# Patient Record
Sex: MALE | Race: WHITE | NOT HISPANIC OR LATINO | Employment: OTHER
[De-identification: names, ages, dates, MRNs, and addresses within clinical notes are randomized per-mention and may not be internally consistent; named-entity substitution may affect disease eponyms.]

---

## 2022-11-25 ENCOUNTER — HOSPITAL ENCOUNTER (OUTPATIENT)
Dept: DATA CONVERSION | Age: 62
End: 2022-11-25
Attending: NURSE PRACTITIONER

## 2023-03-02 LAB
6-ACETYLMORPHINE: <25 NG/ML
7-AMINOCLONAZEPAM: <25 NG/ML
ALPHA-HYDROXYALPRAZOLAM: <25 NG/ML
ALPHA-HYDROXYMIDAZOLAM: <25 NG/ML
ALPRAZOLAM: <25 NG/ML
AMPHETAMINE (PRESENCE) IN URINE BY SCREEN METHOD: ABNORMAL
BARBITURATES PRESENCE IN URINE BY SCREEN METHOD: ABNORMAL
CANNABINOIDS IN URINE BY SCREEN METHOD: ABNORMAL
CHLORDIAZEPOXIDE: <25 NG/ML
CLONAZEPAM: <25 NG/ML
COCAINE (PRESENCE) IN URINE BY SCREEN METHOD: ABNORMAL
CODEINE: <50 NG/ML
CREATINE, URINE FOR DRUG: 207 MG/DL
DIAZEPAM: <25 NG/ML
DRUG SCREEN COMMENT URINE: ABNORMAL
EDDP: <25 NG/ML
FENTANYL CONFIRMATION, URINE: <2.5 NG/ML
HYDROCODONE: 763 NG/ML
HYDROMORPHONE: 294 NG/ML
LORAZEPAM: <25 NG/ML
METHADONE CONFIRMATION,URINE: <25 NG/ML
MIDAZOLAM: <25 NG/ML
MORPHINE URINE: <50 NG/ML
NORDIAZEPAM: <25 NG/ML
NORFENTANYL: <2.5 NG/ML
NORHYDROCODONE: >1000 NG/ML
NOROXYCODONE: <25 NG/ML
O-DESMETHYLTRAMADOL: <50 NG/ML
OXAZEPAM: <25 NG/ML
OXYCODONE: <25 NG/ML
OXYMORPHONE: <25 NG/ML
PHENCYCLIDINE (PRESENCE) IN URINE BY SCREEN METHOD: ABNORMAL
TEMAZEPAM: <25 NG/ML
TRAMADOL: <50 NG/ML
ZOLPIDEM METABOLITE (ZCA): <25 NG/ML
ZOLPIDEM: <25 NG/ML

## 2023-03-15 ENCOUNTER — TELEPHONE (OUTPATIENT)
Dept: PRIMARY CARE | Facility: CLINIC | Age: 63
End: 2023-03-15

## 2023-03-15 DIAGNOSIS — M47.12 CERVICAL ARTHRITIS WITH MYELOPATHY: ICD-10-CM

## 2023-03-15 PROBLEM — R27.0 ATAXIA: Status: ACTIVE | Noted: 2023-03-15

## 2023-03-15 PROBLEM — J30.9 ALLERGIC RHINITIS: Status: ACTIVE | Noted: 2023-03-15

## 2023-03-15 PROBLEM — M16.0 PRIMARY OSTEOARTHRITIS OF BOTH HIPS: Status: ACTIVE | Noted: 2023-03-15

## 2023-03-15 PROBLEM — M75.42 IMPINGEMENT SYNDROME OF LEFT SHOULDER: Status: ACTIVE | Noted: 2023-03-15

## 2023-03-15 PROBLEM — N40.1 BENIGN PROSTATIC HYPERPLASIA WITH LOWER URINARY TRACT SYMPTOMS: Status: ACTIVE | Noted: 2023-03-15

## 2023-03-15 PROBLEM — I10 HYPERTENSION: Status: ACTIVE | Noted: 2023-03-15

## 2023-03-15 PROBLEM — N20.1 RIGHT URETERAL STONE: Status: ACTIVE | Noted: 2023-03-15

## 2023-03-15 PROBLEM — R29.6 FALLS FREQUENTLY: Status: ACTIVE | Noted: 2023-03-15

## 2023-03-15 PROBLEM — F32.1 DEPRESSION, MAJOR, SINGLE EPISODE, MODERATE (MULTI): Status: ACTIVE | Noted: 2023-03-15

## 2023-03-15 PROBLEM — M75.41 IMPINGEMENT SYNDROME OF RIGHT SHOULDER: Status: ACTIVE | Noted: 2023-03-15

## 2023-03-15 PROBLEM — M19.012 PRIMARY OSTEOARTHRITIS OF LEFT SHOULDER: Status: ACTIVE | Noted: 2023-03-15

## 2023-03-15 PROBLEM — S06.9X5A: Status: ACTIVE | Noted: 2023-03-15

## 2023-03-15 PROBLEM — S46.211A STRAIN OF RIGHT BICEPS: Status: ACTIVE | Noted: 2023-03-15

## 2023-03-15 PROBLEM — N52.9 MALE ERECTILE DISORDER: Status: ACTIVE | Noted: 2023-03-15

## 2023-03-15 PROBLEM — F41.9 ANXIETY: Status: ACTIVE | Noted: 2023-03-15

## 2023-03-15 PROBLEM — E78.5 HYPERLIPIDEMIA: Status: ACTIVE | Noted: 2023-03-15

## 2023-03-15 PROBLEM — S06.5XAA SUBDURAL HEMATOMA (MULTI): Status: ACTIVE | Noted: 2023-03-15

## 2023-03-15 PROBLEM — M25.519 SHOULDER PAIN: Status: ACTIVE | Noted: 2023-03-15

## 2023-03-15 PROBLEM — E87.6 HYPOKALEMIA: Status: ACTIVE | Noted: 2023-03-15

## 2023-03-15 PROBLEM — G89.4 CHRONIC PAIN SYNDROME: Status: ACTIVE | Noted: 2023-03-15

## 2023-03-15 PROBLEM — E11.9 CONTROLLED TYPE 2 DIABETES MELLITUS WITHOUT COMPLICATION, WITHOUT LONG-TERM CURRENT USE OF INSULIN (MULTI): Status: ACTIVE | Noted: 2023-03-15

## 2023-03-15 PROBLEM — R56.9 SEIZURE (MULTI): Status: ACTIVE | Noted: 2023-03-15

## 2023-03-15 PROBLEM — M15.9 OSTEOARTHRITIS, GENERALIZED: Status: ACTIVE | Noted: 2023-03-15

## 2023-03-15 PROBLEM — M25.362 INSTABILITY OF LEFT KNEE JOINT: Status: ACTIVE | Noted: 2023-03-15

## 2023-03-15 PROBLEM — R51.9 HEADACHE: Status: ACTIVE | Noted: 2023-03-15

## 2023-03-15 PROBLEM — N20.0 BILATERAL RENAL STONES: Status: ACTIVE | Noted: 2023-03-15

## 2023-03-15 RX ORDER — HYDROCODONE BITARTRATE AND ACETAMINOPHEN 5; 325 MG/1; MG/1
1 TABLET ORAL 4 TIMES DAILY PRN
COMMUNITY
End: 2023-03-15 | Stop reason: SDUPTHER

## 2023-03-15 RX ORDER — HYDROCODONE BITARTRATE AND ACETAMINOPHEN 5; 325 MG/1; MG/1
1 TABLET ORAL 4 TIMES DAILY PRN
Qty: 120 TABLET | Refills: 0 | Status: SHIPPED | OUTPATIENT
Start: 2023-03-15 | End: 2023-04-15 | Stop reason: SDUPTHER

## 2023-04-15 DIAGNOSIS — M47.12 CERVICAL ARTHRITIS WITH MYELOPATHY: ICD-10-CM

## 2023-04-15 RX ORDER — HYDROCODONE BITARTRATE AND ACETAMINOPHEN 5; 325 MG/1; MG/1
1 TABLET ORAL 4 TIMES DAILY PRN
Qty: 120 TABLET | Refills: 0 | Status: SHIPPED | OUTPATIENT
Start: 2023-04-15 | End: 2023-05-18 | Stop reason: SDUPTHER

## 2023-04-18 RX ORDER — POTASSIUM CHLORIDE 750 MG/1
1 CAPSULE, EXTENDED RELEASE ORAL DAILY
COMMUNITY
Start: 2022-11-01 | End: 2023-10-13 | Stop reason: ALTCHOICE

## 2023-04-18 RX ORDER — MELOXICAM 15 MG/1
1 TABLET ORAL DAILY
COMMUNITY
End: 2024-02-06 | Stop reason: SDUPTHER

## 2023-04-18 RX ORDER — HYDROCODONE BITARTRATE AND ACETAMINOPHEN 5; 325 MG/1; MG/1
1 TABLET ORAL 4 TIMES DAILY
COMMUNITY
Start: 2019-10-11 | End: 2023-05-05 | Stop reason: SDUPTHER

## 2023-04-18 RX ORDER — PROPRANOLOL/HYDROCHLOROTHIAZID 40 MG-25MG
TABLET ORAL
COMMUNITY
End: 2023-10-17 | Stop reason: ALTCHOICE

## 2023-04-18 RX ORDER — DULOXETIN HYDROCHLORIDE 60 MG/1
1 CAPSULE, DELAYED RELEASE ORAL DAILY
COMMUNITY
End: 2024-02-06 | Stop reason: SDUPTHER

## 2023-04-18 RX ORDER — BACITRACIN 500 UNIT/G
1 OINTMENT (GRAM) TOPICAL DAILY
COMMUNITY
End: 2023-05-05 | Stop reason: ALTCHOICE

## 2023-04-18 RX ORDER — IBUPROFEN 100 MG/5ML
1000 SUSPENSION, ORAL (FINAL DOSE FORM) ORAL DAILY
COMMUNITY
Start: 2022-08-11

## 2023-04-18 RX ORDER — SILDENAFIL 100 MG/1
100 TABLET, FILM COATED ORAL AS NEEDED
COMMUNITY
End: 2023-05-05 | Stop reason: SDUPTHER

## 2023-04-18 RX ORDER — METOPROLOL SUCCINATE 25 MG/1
1 TABLET, EXTENDED RELEASE ORAL DAILY
COMMUNITY
Start: 2022-11-20 | End: 2023-06-05 | Stop reason: SDUPTHER

## 2023-04-18 RX ORDER — LECITHIN 1200 MG
1 CAPSULE ORAL DAILY
COMMUNITY

## 2023-04-18 RX ORDER — IRBESARTAN 75 MG/1
1 TABLET ORAL DAILY
COMMUNITY
End: 2023-09-01 | Stop reason: ALTCHOICE

## 2023-04-18 RX ORDER — LEVETIRACETAM 500 MG/1
500 TABLET ORAL 2 TIMES DAILY
COMMUNITY
Start: 2022-11-01 | End: 2024-02-06 | Stop reason: SDUPTHER

## 2023-04-18 RX ORDER — HYDROCHLOROTHIAZIDE 12.5 MG/1
1 CAPSULE ORAL DAILY
COMMUNITY
Start: 2022-11-01 | End: 2023-09-01 | Stop reason: ALTCHOICE

## 2023-04-18 RX ORDER — NALOXONE HYDROCHLORIDE 4 MG/.1ML
4 SPRAY NASAL AS NEEDED
COMMUNITY
Start: 2020-05-27

## 2023-04-18 RX ORDER — MUPIROCIN 20 MG/G
OINTMENT TOPICAL 2 TIMES DAILY
COMMUNITY
Start: 2022-11-25 | End: 2023-09-01 | Stop reason: ALTCHOICE

## 2023-04-18 RX ORDER — NEOMYCIN SULFATE, POLYMYXIN B SULFATE, HYDROCORTISONE 3.5; 10000; 1 MG/ML; [USP'U]/ML; MG/ML
SOLUTION/ DROPS AURICULAR (OTIC) 4 TIMES DAILY
COMMUNITY
Start: 2022-03-29 | End: 2023-09-01 | Stop reason: ALTCHOICE

## 2023-04-18 RX ORDER — QUETIAPINE FUMARATE 25 MG/1
25 TABLET, FILM COATED ORAL NIGHTLY
COMMUNITY
Start: 2022-08-05 | End: 2023-05-05 | Stop reason: SDUPTHER

## 2023-04-18 RX ORDER — DULOXETIN HYDROCHLORIDE 30 MG/1
30 CAPSULE, DELAYED RELEASE ORAL
COMMUNITY
End: 2024-02-06 | Stop reason: SDUPTHER

## 2023-04-18 RX ORDER — FENOFIBRIC ACID 135 MG/1
1 CAPSULE, DELAYED RELEASE ORAL DAILY
COMMUNITY
Start: 2020-01-03 | End: 2024-02-06 | Stop reason: SDUPTHER

## 2023-04-18 RX ORDER — ACETAMINOPHEN 500 MG
50 TABLET ORAL DAILY
COMMUNITY
Start: 2022-08-11

## 2023-04-25 ENCOUNTER — APPOINTMENT (OUTPATIENT)
Dept: PRIMARY CARE | Facility: CLINIC | Age: 63
End: 2023-04-25

## 2023-04-26 ENCOUNTER — TELEPHONE (OUTPATIENT)
Dept: PRIMARY CARE | Facility: CLINIC | Age: 63
End: 2023-04-26

## 2023-04-26 NOTE — TELEPHONE ENCOUNTER
Has a rash on right arm. Asking for a cream for it. Cannot come to the office. Went to the ER they gave him hydrocortisone cream. The rash burns, has welts and is in blotches from his armpit to elbow.

## 2023-05-04 ENCOUNTER — TELEPHONE (OUTPATIENT)
Dept: PRIMARY CARE | Facility: CLINIC | Age: 63
End: 2023-05-04

## 2023-05-04 DIAGNOSIS — M75.42 IMPINGEMENT SYNDROME OF LEFT SHOULDER: ICD-10-CM

## 2023-05-04 DIAGNOSIS — M75.41 IMPINGEMENT SYNDROME OF RIGHT SHOULDER: ICD-10-CM

## 2023-05-04 RX ORDER — DICLOFENAC SODIUM 10 MG/G
2 GEL TOPICAL 3 TIMES DAILY PRN
COMMUNITY
Start: 2023-04-20 | End: 2023-05-04 | Stop reason: SDUPTHER

## 2023-05-04 RX ORDER — DICLOFENAC SODIUM 10 MG/G
2 GEL TOPICAL 3 TIMES DAILY PRN
Qty: 100 G | Refills: 3 | Status: SHIPPED | OUTPATIENT
Start: 2023-05-04 | End: 2023-09-01 | Stop reason: ALTCHOICE

## 2023-05-04 NOTE — TELEPHONE ENCOUNTER
5/4  Kenneth called in and said that he could not get his injection at Orthopedics and that he needs more cream for his shoulder.  Voltarin or if there is something stronger.

## 2023-05-05 ENCOUNTER — OFFICE VISIT (OUTPATIENT)
Dept: PRIMARY CARE | Facility: CLINIC | Age: 63
End: 2023-05-05
Payer: COMMERCIAL

## 2023-05-05 VITALS
BODY MASS INDEX: 30.34 KG/M2 | DIASTOLIC BLOOD PRESSURE: 84 MMHG | OXYGEN SATURATION: 96 % | WEIGHT: 188 LBS | SYSTOLIC BLOOD PRESSURE: 126 MMHG | HEART RATE: 68 BPM

## 2023-05-05 DIAGNOSIS — F32.1 DEPRESSION, MAJOR, SINGLE EPISODE, MODERATE (MULTI): ICD-10-CM

## 2023-05-05 DIAGNOSIS — F41.9 ANXIETY: Primary | ICD-10-CM

## 2023-05-05 DIAGNOSIS — I73.9 PVD (PERIPHERAL VASCULAR DISEASE) (CMS-HCC): ICD-10-CM

## 2023-05-05 DIAGNOSIS — R27.0 ATAXIA: ICD-10-CM

## 2023-05-05 DIAGNOSIS — L98.491: ICD-10-CM

## 2023-05-05 DIAGNOSIS — M75.41 IMPINGEMENT SYNDROME OF RIGHT SHOULDER: ICD-10-CM

## 2023-05-05 PROCEDURE — 1036F TOBACCO NON-USER: CPT | Performed by: FAMILY MEDICINE

## 2023-05-05 PROCEDURE — 3074F SYST BP LT 130 MM HG: CPT | Performed by: FAMILY MEDICINE

## 2023-05-05 PROCEDURE — 99214 OFFICE O/P EST MOD 30 MIN: CPT | Performed by: FAMILY MEDICINE

## 2023-05-05 PROCEDURE — 3079F DIAST BP 80-89 MM HG: CPT | Performed by: FAMILY MEDICINE

## 2023-05-05 PROCEDURE — 4010F ACE/ARB THERAPY RXD/TAKEN: CPT | Performed by: FAMILY MEDICINE

## 2023-05-05 PROCEDURE — 20610 DRAIN/INJ JOINT/BURSA W/O US: CPT | Performed by: FAMILY MEDICINE

## 2023-05-05 RX ORDER — ASCORBIC ACID 1000 MG
175 TABLET ORAL DAILY
COMMUNITY

## 2023-05-05 RX ORDER — NALOXONE HYDROCHLORIDE 4 MG/.1ML
1 SPRAY NASAL AS NEEDED
COMMUNITY
End: 2023-05-05 | Stop reason: SDUPTHER

## 2023-05-05 RX ORDER — LIDOCAINE HYDROCHLORIDE 10 MG/ML
2 INJECTION INFILTRATION; PERINEURAL
Status: COMPLETED | OUTPATIENT
Start: 2023-05-05 | End: 2023-05-05

## 2023-05-05 RX ORDER — CHOLECALCIFEROL (VITAMIN D3) 25 MCG
1000 TABLET ORAL DAILY
COMMUNITY
End: 2023-05-05 | Stop reason: SDUPTHER

## 2023-05-05 RX ORDER — DONEPEZIL HYDROCHLORIDE 10 MG/1
10 TABLET, FILM COATED ORAL NIGHTLY
COMMUNITY
Start: 2018-08-03 | End: 2023-10-17 | Stop reason: ALTCHOICE

## 2023-05-05 RX ORDER — QUETIAPINE FUMARATE 25 MG/1
25 TABLET, FILM COATED ORAL NIGHTLY
COMMUNITY
Start: 2022-09-27 | End: 2023-05-05 | Stop reason: SDUPTHER

## 2023-05-05 RX ORDER — QUETIAPINE FUMARATE 25 MG/1
50 TABLET, FILM COATED ORAL NIGHTLY
Qty: 60 TABLET | Refills: 11 | Status: SHIPPED | OUTPATIENT
Start: 2023-05-05 | End: 2024-02-06 | Stop reason: SDUPTHER

## 2023-05-05 RX ORDER — MODAFINIL 200 MG/1
200 TABLET ORAL DAILY
COMMUNITY
Start: 2018-09-07

## 2023-05-05 RX ORDER — IBUPROFEN 100 MG/5ML
1 SUSPENSION, ORAL (FINAL DOSE FORM) ORAL DAILY
COMMUNITY
Start: 2022-08-11 | End: 2023-05-05 | Stop reason: SDUPTHER

## 2023-05-05 RX ORDER — FLUTICASONE PROPIONATE 50 MCG
1 SPRAY, SUSPENSION (ML) NASAL DAILY
COMMUNITY
End: 2024-02-06 | Stop reason: SDUPTHER

## 2023-05-05 RX ORDER — SILDENAFIL 100 MG/1
100 TABLET, FILM COATED ORAL AS NEEDED
COMMUNITY
Start: 2018-06-30

## 2023-05-05 RX ORDER — TRIAMCINOLONE ACETONIDE 40 MG/ML
40 INJECTION, SUSPENSION INTRA-ARTICULAR; INTRAMUSCULAR
Status: COMPLETED | OUTPATIENT
Start: 2023-05-05 | End: 2023-05-05

## 2023-05-05 RX ORDER — OXCARBAZEPINE 300 MG/1
300 TABLET, FILM COATED ORAL 2 TIMES DAILY
COMMUNITY
Start: 2018-10-19 | End: 2023-09-01 | Stop reason: ALTCHOICE

## 2023-05-05 RX ORDER — IRBESARTAN AND HYDROCHLOROTHIAZIDE 300; 12.5 MG/1; MG/1
1 TABLET, FILM COATED ORAL DAILY
COMMUNITY
End: 2023-05-05 | Stop reason: ALTCHOICE

## 2023-05-05 RX ADMIN — LIDOCAINE HYDROCHLORIDE 2 ML: 10 INJECTION INFILTRATION; PERINEURAL at 12:32

## 2023-05-05 RX ADMIN — TRIAMCINOLONE ACETONIDE 40 MG: 40 INJECTION, SUSPENSION INTRA-ARTICULAR; INTRAMUSCULAR at 12:32

## 2023-05-05 ASSESSMENT — PATIENT HEALTH QUESTIONNAIRE - PHQ9
7. TROUBLE CONCENTRATING ON THINGS, SUCH AS READING THE NEWSPAPER OR WATCHING TELEVISION: NOT AT ALL
5. POOR APPETITE OR OVEREATING: NOT AT ALL
3. TROUBLE FALLING OR STAYING ASLEEP: NOT AT ALL
9. THOUGHTS THAT YOU WOULD BE BETTER OFF DEAD, OR OF HURTING YOURSELF: NOT AT ALL
6. FEELING BAD ABOUT YOURSELF - OR THAT YOU ARE A FAILURE OR HAVE LET YOURSELF OR YOUR FAMILY DOWN: MORE THAN HALF THE DAYS
1. LITTLE INTEREST OR PLEASURE IN DOING THINGS: MORE THAN HALF THE DAYS
SUM OF ALL RESPONSES TO PHQ9 QUESTIONS 1 & 2: 4
4. FEELING TIRED OR HAVING LITTLE ENERGY: SEVERAL DAYS
10. IF YOU CHECKED OFF ANY PROBLEMS, HOW DIFFICULT HAVE THESE PROBLEMS MADE IT FOR YOU TO DO YOUR WORK, TAKE CARE OF THINGS AT HOME, OR GET ALONG WITH OTHER PEOPLE: VERY DIFFICULT
8. MOVING OR SPEAKING SO SLOWLY THAT OTHER PEOPLE COULD HAVE NOTICED. OR THE OPPOSITE, BEING SO FIGETY OR RESTLESS THAT YOU HAVE BEEN MOVING AROUND A LOT MORE THAN USUAL: MORE THAN HALF THE DAYS
SUM OF ALL RESPONSES TO PHQ QUESTIONS 1-9: 9
2. FEELING DOWN, DEPRESSED OR HOPELESS: MORE THAN HALF THE DAYS

## 2023-05-05 NOTE — PROGRESS NOTES
Subjective   Patient ID: Kenneth Valenzuela is a 63 y.o. male who presents for evaluate (Behavior issues and joint pain, check toe on right foot).    HPI   Right shoulder pain.  Had a fall a month ago. Aches a lot. Pain with abduction and using walker     Right toe has been purple and painful and sore. Has been fitted for a brace per [facility].  That is for right ankle  to help with pressure against toe. Pain in toe at IP.  Very tender.  Looks discolored.  He feels there is something in there that needs to come out.  The toe is the most bothersome thing today.  He does have DM but last A1C 6.6%      Behavioral issues aggravated again. Has depression.   Does not like to take the medication.   Has trouble remembering them.. He is only on Seroquel 25 mg daily at hs. Will up to 50 and see how he does.     Review of Systems    Objective   /84 (BP Location: Left arm, Patient Position: Sitting)   Pulse 68   Wt 85.3 kg (188 lb)   SpO2 96%   BMI 30.34 kg/m²     Physical Exam  Alert and oriented x3.  No acute distress.  He is unstable as usually is with his ataxia.  He has multiple abrasions at various stages of healing.  Needs to use a walker to stand.    Right shoulder with good range of motion but positive Nickerson and Neer's.  Tender subacromial.  No redness, swelling, warmth.    Right foot with 2+ PT and DP pulses.  However his capillary refill is about 3 seconds distal great and second toe.  There is a small ulcer with callus around it on the plantar side of the IP joint medially.  I shaved off some of the callus.  And did have some bleeding with that.  Easily controlled with a Band-Aid.  Walking out he said it felt a lot better.    Good mood and affect, judgment and insight.  Pleasant and cooperative.  He described the pills he takes as different colors and shapes rather than the names.  So I did write out the name of the Seroquel that I am going to have him increase.Patient ID: Kenneth Valenzuela is a 63  y.o. male.    Joint Injection Large/Arthrocentesis: R subacromial bursa on 5/5/2023 12:32 PM  Indications: pain  Details: 25 G needle, lateral approach  Medications: 40 mg triamcinolone acetonide 40 mg/mL; 2 mL lidocaine 10 mg/mL (1 %)  Outcome: tolerated well, no immediate complications  Procedure, treatment alternatives, risks and benefits explained, specific risks discussed. Patient was prepped and draped in the usual sterile fashion (Prep with Chlorhexidine.  ).             Assessment/Plan   Problem List Items Addressed This Visit       Anxiety - Primary    Depression, major, single episode, moderate (CMS/HCC)    Impingement syndrome of right shoulder     Other Visit Diagnoses       PVD (peripheral vascular disease) (CMS/HCC)        Ischemic ulcer, limited to breakdown of skin (CMS/HCC)

## 2023-05-18 ENCOUNTER — TELEPHONE (OUTPATIENT)
Dept: PRIMARY CARE | Facility: CLINIC | Age: 63
End: 2023-05-18

## 2023-05-18 DIAGNOSIS — M47.12 CERVICAL ARTHRITIS WITH MYELOPATHY: ICD-10-CM

## 2023-05-18 RX ORDER — HYDROCODONE BITARTRATE AND ACETAMINOPHEN 5; 325 MG/1; MG/1
1 TABLET ORAL 4 TIMES DAILY PRN
Qty: 120 TABLET | Refills: 0 | Status: SHIPPED | OUTPATIENT
Start: 2023-05-18 | End: 2023-06-14 | Stop reason: SDUPTHER

## 2023-05-23 ENCOUNTER — TELEPHONE (OUTPATIENT)
Dept: PRIMARY CARE | Facility: CLINIC | Age: 63
End: 2023-05-23

## 2023-05-23 NOTE — TELEPHONE ENCOUNTER
----- Message from Nils Burleson MD sent at 5/23/2023 12:55 PM EDT -----  Let him know the circulation to the legs is normal

## 2023-05-27 PROBLEM — R45.4 BEHAVIOR-IRRITABILITY: Status: ACTIVE | Noted: 2019-04-05

## 2023-05-27 PROBLEM — R46.89: Status: ACTIVE | Noted: 2019-04-05

## 2023-05-27 PROBLEM — S06.9XAS: Status: ACTIVE | Noted: 2019-04-05

## 2023-05-27 PROBLEM — R41.89 COGNITIVE DEFICIT AS LATE EFFECT OF TRAUMATIC BRAIN INJURY: Status: ACTIVE | Noted: 2019-04-05

## 2023-05-27 PROBLEM — F06.8 COGNITIVE DEFICIT AS LATE EFFECT OF TRAUMATIC BRAIN INJURY (CMS-HCC): Status: ACTIVE | Noted: 2019-04-05

## 2023-05-27 PROBLEM — S06.9X0S: Status: ACTIVE | Noted: 2019-04-05

## 2023-05-27 PROBLEM — R53.1 WEAKNESS: Status: ACTIVE | Noted: 2022-10-22

## 2023-05-27 PROBLEM — E66.9 OBESITY (BMI 30.0-34.9): Status: ACTIVE | Noted: 2017-11-13

## 2023-05-27 PROBLEM — S09.90XA HEAD INJURY: Status: ACTIVE | Noted: 2023-05-27

## 2023-05-27 PROBLEM — R00.0 TACHYCARDIA: Status: ACTIVE | Noted: 2023-05-27

## 2023-05-27 PROBLEM — E66.811 OBESITY (BMI 30.0-34.9): Status: ACTIVE | Noted: 2017-11-13

## 2023-05-27 PROBLEM — M54.6 THORACIC BACK PAIN: Status: ACTIVE | Noted: 2022-10-22

## 2023-05-27 PROBLEM — G47.33 OSA (OBSTRUCTIVE SLEEP APNEA): Status: ACTIVE | Noted: 2019-04-05

## 2023-05-27 PROBLEM — S06.9X0S COGNITIVE DEFICIT AS LATE EFFECT OF TRAUMATIC BRAIN INJURY (CMS-HCC): Status: ACTIVE | Noted: 2019-04-05

## 2023-05-27 PROBLEM — G43.019 INTRACTABLE MIGRAINE WITHOUT AURA AND WITHOUT STATUS MIGRAINOSUS: Status: ACTIVE | Noted: 2019-04-05

## 2023-05-27 PROBLEM — R26.89 BALANCE DISTURBANCE DUE TO OLD HEAD INJURY: Status: ACTIVE | Noted: 2019-04-05

## 2023-05-27 PROBLEM — S06.9XAS COGNITIVE DEFICIT AS LATE EFFECT OF TRAUMATIC BRAIN INJURY: Status: ACTIVE | Noted: 2019-04-05

## 2023-05-27 PROBLEM — M20.21 ACQUIRED HALLUX RIGIDUS OF RIGHT FOOT: Status: ACTIVE | Noted: 2023-05-27

## 2023-05-27 PROBLEM — I62.9 INTRACRANIAL BLEED (MULTI): Status: ACTIVE | Noted: 2022-10-01

## 2023-05-27 PROBLEM — E83.52 HYPERCALCEMIA: Status: ACTIVE | Noted: 2023-05-27

## 2023-05-27 PROBLEM — M71.162: Status: ACTIVE | Noted: 2019-02-20

## 2023-05-27 PROBLEM — M25.552 HIP PAIN, LEFT: Status: ACTIVE | Noted: 2023-05-27

## 2023-05-27 PROBLEM — S09.90XS BALANCE DISTURBANCE DUE TO OLD HEAD INJURY: Status: ACTIVE | Noted: 2019-04-05

## 2023-05-27 PROBLEM — G56.00 MEDIAN NERVE ENTRAPMENT: Status: ACTIVE | Noted: 2023-05-27

## 2023-05-27 PROBLEM — E11.42 DIABETIC POLYNEUROPATHY ASSOCIATED WITH TYPE 2 DIABETES MELLITUS (MULTI): Status: ACTIVE | Noted: 2023-05-27

## 2023-06-01 LAB
ALANINE AMINOTRANSFERASE (SGPT) (U/L) IN SER/PLAS: 28 U/L (ref 10–52)
ALBUMIN (G/DL) IN SER/PLAS: 4.5 G/DL (ref 3.4–5)
ALKALINE PHOSPHATASE (U/L) IN SER/PLAS: 50 U/L (ref 33–136)
ANION GAP IN SER/PLAS: 12 MMOL/L (ref 10–20)
ASPARTATE AMINOTRANSFERASE (SGOT) (U/L) IN SER/PLAS: 26 U/L (ref 9–39)
BILIRUBIN TOTAL (MG/DL) IN SER/PLAS: 0.8 MG/DL (ref 0–1.2)
CALCIUM (MG/DL) IN SER/PLAS: 9.8 MG/DL (ref 8.6–10.3)
CARBON DIOXIDE, TOTAL (MMOL/L) IN SER/PLAS: 28 MMOL/L (ref 21–32)
CHLORIDE (MMOL/L) IN SER/PLAS: 103 MMOL/L (ref 98–107)
CREATININE (MG/DL) IN SER/PLAS: 0.8 MG/DL (ref 0.5–1.3)
ESTIMATED AVERAGE GLUCOSE FOR HBA1C: 171 MG/DL
GFR MALE: >90 ML/MIN/1.73M2
GLUCOSE (MG/DL) IN SER/PLAS: 139 MG/DL (ref 74–99)
HEMOGLOBIN A1C/HEMOGLOBIN TOTAL IN BLOOD: 7.6 %
POTASSIUM (MMOL/L) IN SER/PLAS: 4.1 MMOL/L (ref 3.5–5.3)
PROTEIN TOTAL: 6.8 G/DL (ref 6.4–8.2)
SODIUM (MMOL/L) IN SER/PLAS: 139 MMOL/L (ref 136–145)
UREA NITROGEN (MG/DL) IN SER/PLAS: 14 MG/DL (ref 6–23)

## 2023-06-05 ENCOUNTER — OFFICE VISIT (OUTPATIENT)
Dept: PRIMARY CARE | Facility: CLINIC | Age: 63
End: 2023-06-05
Payer: COMMERCIAL

## 2023-06-05 VITALS
BODY MASS INDEX: 30.99 KG/M2 | SYSTOLIC BLOOD PRESSURE: 118 MMHG | OXYGEN SATURATION: 93 % | WEIGHT: 192 LBS | HEART RATE: 85 BPM | DIASTOLIC BLOOD PRESSURE: 72 MMHG

## 2023-06-05 DIAGNOSIS — R27.0 ATAXIA: ICD-10-CM

## 2023-06-05 DIAGNOSIS — E11.42 DIABETIC POLYNEUROPATHY ASSOCIATED WITH TYPE 2 DIABETES MELLITUS (MULTI): ICD-10-CM

## 2023-06-05 DIAGNOSIS — R35.0 BENIGN PROSTATIC HYPERPLASIA WITH URINARY FREQUENCY: ICD-10-CM

## 2023-06-05 DIAGNOSIS — E66.09 CLASS 1 OBESITY DUE TO EXCESS CALORIES WITH SERIOUS COMORBIDITY AND BODY MASS INDEX (BMI) OF 31.0 TO 31.9 IN ADULT: ICD-10-CM

## 2023-06-05 DIAGNOSIS — S06.9X0S COGNITIVE DEFICIT AS LATE EFFECT OF TRAUMATIC BRAIN INJURY (CMS-HCC): ICD-10-CM

## 2023-06-05 DIAGNOSIS — G43.009 MIGRAINE WITHOUT AURA AND WITHOUT STATUS MIGRAINOSUS, NOT INTRACTABLE: ICD-10-CM

## 2023-06-05 DIAGNOSIS — J30.9 ALLERGIC RHINITIS, UNSPECIFIED SEASONALITY, UNSPECIFIED TRIGGER: Primary | ICD-10-CM

## 2023-06-05 DIAGNOSIS — S09.90XS BALANCE DISTURBANCE DUE TO OLD HEAD INJURY: ICD-10-CM

## 2023-06-05 DIAGNOSIS — R46.89 DIFFICULTY CONTROLLING BEHAVIOR AS LATE EFFECT TRAUMATIC BRAIN INJURY (CMS-HCC): ICD-10-CM

## 2023-06-05 DIAGNOSIS — R00.0 TACHYCARDIA: ICD-10-CM

## 2023-06-05 DIAGNOSIS — S06.9X0S DIFFICULTY CONTROLLING BEHAVIOR AS LATE EFFECT TRAUMATIC BRAIN INJURY (CMS-HCC): ICD-10-CM

## 2023-06-05 DIAGNOSIS — R29.6 FALLS FREQUENTLY: ICD-10-CM

## 2023-06-05 DIAGNOSIS — S06.9X5A: ICD-10-CM

## 2023-06-05 DIAGNOSIS — S06.5XAA SUBDURAL HEMATOMA (MULTI): ICD-10-CM

## 2023-06-05 DIAGNOSIS — R56.9 SEIZURE (MULTI): ICD-10-CM

## 2023-06-05 DIAGNOSIS — R26.89 BALANCE DISTURBANCE DUE TO OLD HEAD INJURY: ICD-10-CM

## 2023-06-05 DIAGNOSIS — M19.012 PRIMARY OSTEOARTHRITIS OF LEFT SHOULDER: ICD-10-CM

## 2023-06-05 DIAGNOSIS — N40.1 BENIGN PROSTATIC HYPERPLASIA WITH URINARY FREQUENCY: ICD-10-CM

## 2023-06-05 DIAGNOSIS — F41.9 ANXIETY: ICD-10-CM

## 2023-06-05 DIAGNOSIS — G89.4 CHRONIC PAIN SYNDROME: ICD-10-CM

## 2023-06-05 DIAGNOSIS — F06.8 COGNITIVE DEFICIT AS LATE EFFECT OF TRAUMATIC BRAIN INJURY (CMS-HCC): ICD-10-CM

## 2023-06-05 DIAGNOSIS — R45.4 BEHAVIOR-IRRITABILITY: ICD-10-CM

## 2023-06-05 DIAGNOSIS — M16.0 PRIMARY OSTEOARTHRITIS OF BOTH HIPS: ICD-10-CM

## 2023-06-05 PROBLEM — M15.9 OSTEOARTHRITIS, GENERALIZED: Status: RESOLVED | Noted: 2023-03-15 | Resolved: 2023-06-05

## 2023-06-05 PROBLEM — S09.90XA HEAD INJURY: Status: RESOLVED | Noted: 2023-05-27 | Resolved: 2023-06-05

## 2023-06-05 PROBLEM — R53.1 WEAKNESS: Status: RESOLVED | Noted: 2022-10-22 | Resolved: 2023-06-05

## 2023-06-05 PROBLEM — N20.1 RIGHT URETERAL STONE: Status: RESOLVED | Noted: 2023-03-15 | Resolved: 2023-06-05

## 2023-06-05 PROBLEM — G47.33 OSA (OBSTRUCTIVE SLEEP APNEA): Status: RESOLVED | Noted: 2019-04-05 | Resolved: 2023-06-05

## 2023-06-05 PROBLEM — S46.211A STRAIN OF RIGHT BICEPS: Status: RESOLVED | Noted: 2023-03-15 | Resolved: 2023-06-05

## 2023-06-05 PROBLEM — M25.519 SHOULDER PAIN: Status: RESOLVED | Noted: 2023-03-15 | Resolved: 2023-06-05

## 2023-06-05 PROCEDURE — 99214 OFFICE O/P EST MOD 30 MIN: CPT | Performed by: FAMILY MEDICINE

## 2023-06-05 PROCEDURE — 3078F DIAST BP <80 MM HG: CPT | Performed by: FAMILY MEDICINE

## 2023-06-05 PROCEDURE — 1036F TOBACCO NON-USER: CPT | Performed by: FAMILY MEDICINE

## 2023-06-05 PROCEDURE — 4010F ACE/ARB THERAPY RXD/TAKEN: CPT | Performed by: FAMILY MEDICINE

## 2023-06-05 PROCEDURE — 3008F BODY MASS INDEX DOCD: CPT | Performed by: FAMILY MEDICINE

## 2023-06-05 PROCEDURE — 3074F SYST BP LT 130 MM HG: CPT | Performed by: FAMILY MEDICINE

## 2023-06-05 PROCEDURE — 3051F HG A1C>EQUAL 7.0%<8.0%: CPT | Performed by: FAMILY MEDICINE

## 2023-06-05 RX ORDER — METOPROLOL SUCCINATE 25 MG/1
25 TABLET, EXTENDED RELEASE ORAL DAILY
Qty: 90 TABLET | Refills: 2 | Status: SHIPPED | OUTPATIENT
Start: 2023-06-05 | End: 2024-02-06 | Stop reason: SDUPTHER

## 2023-06-05 ASSESSMENT — PATIENT HEALTH QUESTIONNAIRE - PHQ9
2. FEELING DOWN, DEPRESSED OR HOPELESS: SEVERAL DAYS
1. LITTLE INTEREST OR PLEASURE IN DOING THINGS: NOT AT ALL
SUM OF ALL RESPONSES TO PHQ9 QUESTIONS 1 AND 2: 1

## 2023-06-05 NOTE — PROGRESS NOTES
Subjective   Patient ID: Kenneth Valenzuela is a 63 y.o. male who presents for Med Management (Discuss nighttime meds., discuss swallow study).    HPI     Had a fall at home prior to the visit. Scraped knee and strained shoulder. Still with back pain from fall in [place] a few weeks ago. Toe pain persists as it will not heal. He is going to Dr Andrew.  Debrided.     Allergic rhinitis - has been on INS and helps. A lot of mucus in AM from nose. On a supplement Balance of Nature to help immune system      BPH - had a Urolift procedure on September 3, 2020. Able to retain fluid and still occasional nocturia. No burning. Flow was good and larger amounts but now diminishing. Now no meds. To follow up with Dr Waddell.  No medications.      Cervical spondylosis with myelopathy and radiculopathy - hard to tell if myelopathy causing his imbalance or if due to TBI. Using Norco pretty regularly as pain seems to be more daily intolerable.  Pain levels 3 to 10 Med drops it by 5 for 6- 8 hours. The shoulder is the most painful. Did get an injection from Rosy Leung in the left shoulder that helps as does one in hip. And then I did one in right that did not help a lot.  To see Dr Flaherty soon. That is helping there. Able to walk better with less pain in left hip and able to do more ADLs and easier walking  with Norco. About 3 in a day of the [place].   Home traction unit helps the back. Mobic helps some as he noted a lot worse when off of it.  Would like to do more in yard but balance and pain limit.      Closed traumatic brain injury - falls as balance is getting worse since he had the seizures in 2018. Aricept for the memory did not help. Has tried CBD gummies and says they help balance. But aware he cannot get Norco and stay on those. Dr Perez is evaluating the falls to see if any treatment would help. Dr Machado.  And to see Pain management in [place]      Depression and anxiety - He is on Seroquel 25 at 2 qhs. . Dries out mouth but not as  bad as Risperdal. Keeps working on not raising his voice and Cymbalta 30 and 60. Dr Smith managed TBI but no longer.      Hyperlipidemia - . On Trilipix and still has high TG at 233 in December but down from upper 300s before that.      Hypertension - meds work well without side effects. Weight is up a bit. Apple Cider vinegar helps . No Chest pain, Dyspnea, palpitations, numbness, weakness, edema, claudications, or new double vision/ loss of vision.      Impaired fasting glucose - BS now DM at 139 and A1C of 7.6  this time and has done well with diet.   Home glucose rarely checked.  low to mid 100s. GFR over 90     Male erectile disorder - Viagra works well. Wife is not interested.      MIGRAINE - better on current meds. Sumatriptan works well. Trileptal. Aura is a stiff neck. Traction helped this as well and not needing it now. Will use Icy Hot and does seem to help     Obesity - Up 4 this time. BMI 30.  Diet with lesser portions and more greens not as much now.      Osteoarthritis of both knees with TKR Left. The left knee will pop if he kneels. Limits kneeling. Feels more sloppy in the patella.      Primary osteoarthritis of both hips - better on the left with injection of Kenalog. Probably needs THR.     Seizure - Last seizure in October. with admission. . Went off of seizure meds. So now back on Keppra. Doing well. Driving per Dr Perez.. Had subdural from fall and that has stabilized.      I have personally reviewed the patients OARRS report. This report is filed in the EHR. I have considered the risks of abuse, addiction and diversion. I believe it is clinically appropriate to continue to prescribe this medication. Consistent with the history      CSA 2/28/23  UDS 3/4/21 as expected for medication profile 3/7/22 as expected for medication profile 2/28/23 as expected for medication profile   PSA 12/1/22  Colonoscopy 11/18/15    Review of Systems    Objective   /72 (BP Location: Left arm, Patient  Position: Sitting)   Pulse 85   Wt 87.1 kg (192 lb)   SpO2 93%   BMI 30.99 kg/m²     Physical Exam  General: Alert and oriented, No acute distress. Unsteady with chair to table   Eye: Pupils are equal, round and reactive to light, Extraocular movements are intact, Normal conjunctiva.. No nystagmus.   Neck: Supple, Non-tender, No carotid bruit, No jugular venous distention, No lymphadenopathy, No thyromegaly.    Carotid pulse: Bilaterally, Strong.   Respiratory: Lungs are clear to auscultation, Respirations are non-labored, Breath sounds are equal, Symmetrical chest wall expansion.   Cardiovascular: Normal rate, Regular rhythm, No murmur, No gallop, Good pulses equal in all extremities, No edema.   Gastrointestinal: Soft, Non-tender, Non-distended, No organomegaly.    Bowel sounds: All four quadrants, Present, No bruit present.   Integumentary: Warm, Dry, Pink. abrasion on right knee.   Psychiatric: Cooperative, Appropriate mood & affect, Normal judgment.    Behavior: No pressured speech.    Judgment: Able to make sensible decisions.    Thought process: Appropriate.    Unsteady with walking and getting from sit to stand   Low back with no tenderness.  HIps with no IR and minimal ER  Abrasion clean on right knee.   Right ankle in brace.     Assessment/Plan   Problem List Items Addressed This Visit       Allergic rhinitis - Primary    Anxiety    Ataxia    Balance disturbance due to old head injury    Relevant Orders    Follow Up In Primary Care    Behavior-irritability    Benign prostatic hyperplasia with lower urinary tract symptoms    Chronic pain syndrome    Class 1 obesity due to excess calories with serious comorbidity and body mass index (BMI) of 31.0 to 31.9 in adult    Cognitive deficit as late effect of traumatic brain injury (CMS/HCC)    Diabetic polyneuropathy associated with type 2 diabetes mellitus (CMS/HCC)    Difficulty controlling behavior as late effect traumatic brain injury (CMS/HCC)    Falls  frequently    Migraine without aura and without status migrainosus, not intractable    Primary osteoarthritis of both hips    Primary osteoarthritis of left shoulder    Seizure (CMS/HCC)    Subdural hematoma (CMS/HCC)    Tachycardia    Relevant Medications    metoprolol succinate XL (Toprol-XL) 25 mg 24 hr tablet    Unspecified intracranial injury with loss of consciousness greater than 24 hours with return to pre-existing conscious level, initial encounter (CMS/[facility])        Patient was identified as a fall risk. Risk prevention instructions provided. He is know fall risk and uses walker. Has Ataxia.

## 2023-06-14 ENCOUNTER — TELEPHONE (OUTPATIENT)
Dept: PRIMARY CARE | Facility: CLINIC | Age: 63
End: 2023-06-14

## 2023-06-14 DIAGNOSIS — M47.12 CERVICAL ARTHRITIS WITH MYELOPATHY: ICD-10-CM

## 2023-06-14 RX ORDER — HYDROCODONE BITARTRATE AND ACETAMINOPHEN 5; 325 MG/1; MG/1
1 TABLET ORAL 4 TIMES DAILY PRN
Qty: 120 TABLET | Refills: 0 | Status: SHIPPED | OUTPATIENT
Start: 2023-06-14 | End: 2023-07-18 | Stop reason: SDUPTHER

## 2023-07-07 ENCOUNTER — TELEPHONE (OUTPATIENT)
Dept: PRIMARY CARE | Facility: CLINIC | Age: 63
End: 2023-07-07

## 2023-07-07 DIAGNOSIS — M47.12 CERVICAL ARTHRITIS WITH MYELOPATHY: ICD-10-CM

## 2023-07-07 NOTE — TELEPHONE ENCOUNTER
Pt called and said he went to Ortho and they are considering a shoulder replacement, the Dr told him that if his pain worsens to call his primary doctor about obtaining more pain medications. So he was wondering if he could get more. Told Pt you would call him if its decided to give him more

## 2023-07-18 RX ORDER — HYDROCODONE BITARTRATE AND ACETAMINOPHEN 5; 325 MG/1; MG/1
1 TABLET ORAL 4 TIMES DAILY PRN
Qty: 120 TABLET | Refills: 0 | Status: SHIPPED | OUTPATIENT
Start: 2023-07-18 | End: 2023-08-18 | Stop reason: SDUPTHER

## 2023-07-20 ENCOUNTER — TELEPHONE (OUTPATIENT)
Dept: PRIMARY CARE | Facility: CLINIC | Age: 63
End: 2023-07-20

## 2023-08-07 PROBLEM — S40.011A CONTUSION OF RIGHT SHOULDER: Status: ACTIVE | Noted: 2023-08-07

## 2023-08-18 ENCOUNTER — TELEPHONE (OUTPATIENT)
Dept: PRIMARY CARE | Facility: CLINIC | Age: 63
End: 2023-08-18

## 2023-08-18 ENCOUNTER — OFFICE VISIT (OUTPATIENT)
Dept: PRIMARY CARE | Facility: CLINIC | Age: 63
End: 2023-08-18
Payer: COMMERCIAL

## 2023-08-18 VITALS
WEIGHT: 187.6 LBS | DIASTOLIC BLOOD PRESSURE: 80 MMHG | HEART RATE: 108 BPM | SYSTOLIC BLOOD PRESSURE: 120 MMHG | BODY MASS INDEX: 30.28 KG/M2 | OXYGEN SATURATION: 99 %
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
## 2023-08-18 DIAGNOSIS — M70.62 GREATER TROCHANTERIC BURSITIS OF LEFT HIP: ICD-10-CM

## 2023-08-18 DIAGNOSIS — M47.12 CERVICAL ARTHRITIS WITH MYELOPATHY: ICD-10-CM

## 2023-08-18 DIAGNOSIS — M25.552 LEFT HIP PAIN: Primary | ICD-10-CM

## 2023-08-18 PROCEDURE — 20550 NJX 1 TENDON SHEATH/LIGAMENT: CPT | Performed by: STUDENT IN AN ORGANIZED HEALTH CARE EDUCATION/TRAINING PROGRAM

## 2023-08-18 PROCEDURE — 3008F BODY MASS INDEX DOCD: CPT | Performed by: STUDENT IN AN ORGANIZED HEALTH CARE EDUCATION/TRAINING PROGRAM

## 2023-08-18 PROCEDURE — 3074F SYST BP LT 130 MM HG: CPT | Performed by: STUDENT IN AN ORGANIZED HEALTH CARE EDUCATION/TRAINING PROGRAM

## 2023-08-18 PROCEDURE — 4010F ACE/ARB THERAPY RXD/TAKEN: CPT | Performed by: STUDENT IN AN ORGANIZED HEALTH CARE EDUCATION/TRAINING PROGRAM

## 2023-08-18 PROCEDURE — 3051F HG A1C>EQUAL 7.0%<8.0%: CPT | Performed by: STUDENT IN AN ORGANIZED HEALTH CARE EDUCATION/TRAINING PROGRAM

## 2023-08-18 PROCEDURE — 3079F DIAST BP 80-89 MM HG: CPT | Performed by: STUDENT IN AN ORGANIZED HEALTH CARE EDUCATION/TRAINING PROGRAM

## 2023-08-18 PROCEDURE — 1036F TOBACCO NON-USER: CPT | Performed by: STUDENT IN AN ORGANIZED HEALTH CARE EDUCATION/TRAINING PROGRAM

## 2023-08-18 PROCEDURE — 99213 OFFICE O/P EST LOW 20 MIN: CPT | Performed by: STUDENT IN AN ORGANIZED HEALTH CARE EDUCATION/TRAINING PROGRAM

## 2023-08-18 RX ORDER — TRIAMCINOLONE ACETONIDE 40 MG/ML
40 INJECTION, SUSPENSION INTRA-ARTICULAR; INTRAMUSCULAR ONCE
Status: COMPLETED | OUTPATIENT
Start: 2023-08-18 | End: 2023-08-18

## 2023-08-18 RX ORDER — HYDROCODONE BITARTRATE AND ACETAMINOPHEN 5; 325 MG/1; MG/1
1 TABLET ORAL 4 TIMES DAILY PRN
Qty: 120 TABLET | Refills: 0 | Status: SHIPPED | OUTPATIENT
Start: 2023-08-18 | End: 2023-09-01

## 2023-08-18 RX ADMIN — TRIAMCINOLONE ACETONIDE 40 MG: 40 INJECTION, SUSPENSION INTRA-ARTICULAR; INTRAMUSCULAR at 14:20

## 2023-08-18 NOTE — PROGRESS NOTES
Subjective   Patient ID: Kenneth Valenzuela is a 63 y.o. male who presents for left hip pain evaluation.    HPI    PT is here for extreme left hip pain. PT states he falls all the time and had a fall when the hip pain started, wife states he is losing a lot of strength due to a lot of pain areas. Denies going to the hospital and having imaging done. Pain started 2 weeks ago. Wife states 2 days ago was the last fall. Denies bruising or swelling to the hip. Denies trying heat or ice. Denies trying OTC pain meds but does have an rx for [place]. States its not lasting as long with this hip pain.    Procedure note: : Left greater trochanteric bursal injection.  Risks, benefits, personnel, and alternatives were discussed including but not limited to risk of bleeding, infection, and skin changes due to the steroid. Patient agrees to proceed with  Left greater trochanteric bursal injection. Landmarks palpated and target marked. After cleansing with alcohol and using sterile technique, 1cc of 1% plain lidocaine and 1cc of 40mg/cc Kenalog was injected into the Left greater trochanteric area using the fan-like approach. The medication was injected easily and without resistance and the needle was withdrawn. A 1.5 in 25 g needle was used. Hemostasis was good. A bandage was applied over the skin after removal of the Betadine with an alcohol swab. The patient tolerated the procedure well and there were no complications. The patient was given verbal aftercare instructions.      Review of Systems  ROS negative except discussed above in HPI.    Vitals:    08/18/23 1328   BP: 120/80   Pulse: 108   SpO2: 99%     Objective   Physical Exam  Points pain towards trochanteric bursa. Tenderness appreciated in trochanteric bursa.     Assessment/Plan   Kenneth was seen today for sick.  Diagnoses and all orders for this visit:  Left hip pain (Primary)  -     XR hip left 2 or 3 views; Future  Fall, subsequent encounter  -     XR hip left 2 or 3 views;  Future      Follow up in     Oni Meza MD MPH

## 2023-08-25 ENCOUNTER — TELEPHONE (OUTPATIENT)
Dept: PRIMARY CARE | Facility: CLINIC | Age: 63
End: 2023-08-25

## 2023-08-25 NOTE — TELEPHONE ENCOUNTER
Patient is scheduled for shoulder surgery for September 25. The doctor office is supposed to fax paperwork for clearance. Asking if the patient needs to be seen by Dr. Burleson. Asking for a call back.

## 2023-08-30 RX ORDER — IRBESARTAN AND HYDROCHLOROTHIAZIDE 300; 12.5 MG/1; MG/1
1 TABLET, FILM COATED ORAL DAILY
COMMUNITY
End: 2024-02-06 | Stop reason: ALTCHOICE

## 2023-08-30 RX ORDER — 1.1% SODIUM FLUORIDE PRESCRIPTION DENTAL CREAM 5 MG/G
CREAM DENTAL DAILY
COMMUNITY
Start: 2023-08-23

## 2023-09-01 ENCOUNTER — OFFICE VISIT (OUTPATIENT)
Dept: PRIMARY CARE | Facility: CLINIC | Age: 63
End: 2023-09-01
Payer: COMMERCIAL

## 2023-09-01 ENCOUNTER — LAB (OUTPATIENT)
Dept: LAB | Facility: LAB | Age: 63
End: 2023-09-01
Payer: COMMERCIAL

## 2023-09-01 VITALS
SYSTOLIC BLOOD PRESSURE: 126 MMHG | OXYGEN SATURATION: 97 % | WEIGHT: 189 LBS | DIASTOLIC BLOOD PRESSURE: 80 MMHG | HEART RATE: 83 BPM | BODY MASS INDEX: 30.51 KG/M2

## 2023-09-01 DIAGNOSIS — R46.89 DIFFICULTY CONTROLLING BEHAVIOR AS LATE EFFECT TRAUMATIC BRAIN INJURY (CMS-HCC): ICD-10-CM

## 2023-09-01 DIAGNOSIS — S06.9X0S COGNITIVE DEFICIT AS LATE EFFECT OF TRAUMATIC BRAIN INJURY (CMS-HCC): ICD-10-CM

## 2023-09-01 DIAGNOSIS — R29.6 FALLS FREQUENTLY: ICD-10-CM

## 2023-09-01 DIAGNOSIS — N40.1 BENIGN PROSTATIC HYPERPLASIA WITH NOCTURIA: ICD-10-CM

## 2023-09-01 DIAGNOSIS — R35.1 BENIGN PROSTATIC HYPERPLASIA WITH NOCTURIA: ICD-10-CM

## 2023-09-01 DIAGNOSIS — F06.8 COGNITIVE DEFICIT AS LATE EFFECT OF TRAUMATIC BRAIN INJURY (CMS-HCC): ICD-10-CM

## 2023-09-01 DIAGNOSIS — S06.5XAA SUBDURAL HEMATOMA (MULTI): ICD-10-CM

## 2023-09-01 DIAGNOSIS — R26.89 BALANCE DISTURBANCE DUE TO OLD HEAD INJURY: Primary | ICD-10-CM

## 2023-09-01 DIAGNOSIS — Z01.810 PRE-OPERATIVE CARDIOVASCULAR EXAMINATION: ICD-10-CM

## 2023-09-01 DIAGNOSIS — M75.41 IMPINGEMENT SYNDROME OF RIGHT SHOULDER: ICD-10-CM

## 2023-09-01 DIAGNOSIS — R56.9 SEIZURE (MULTI): ICD-10-CM

## 2023-09-01 DIAGNOSIS — M16.0 PRIMARY OSTEOARTHRITIS OF BOTH HIPS: ICD-10-CM

## 2023-09-01 DIAGNOSIS — M47.12 CERVICAL ARTHRITIS WITH MYELOPATHY: ICD-10-CM

## 2023-09-01 DIAGNOSIS — E11.9 CONTROLLED TYPE 2 DIABETES MELLITUS WITHOUT COMPLICATION, WITHOUT LONG-TERM CURRENT USE OF INSULIN (MULTI): ICD-10-CM

## 2023-09-01 DIAGNOSIS — I10 PRIMARY HYPERTENSION: ICD-10-CM

## 2023-09-01 DIAGNOSIS — S09.90XS BALANCE DISTURBANCE DUE TO OLD HEAD INJURY: Primary | ICD-10-CM

## 2023-09-01 DIAGNOSIS — R45.4 BEHAVIOR-IRRITABILITY: ICD-10-CM

## 2023-09-01 DIAGNOSIS — E78.2 MIXED HYPERLIPIDEMIA: ICD-10-CM

## 2023-09-01 DIAGNOSIS — J30.9 ALLERGIC RHINITIS, UNSPECIFIED SEASONALITY, UNSPECIFIED TRIGGER: ICD-10-CM

## 2023-09-01 DIAGNOSIS — F32.1 DEPRESSION, MAJOR, SINGLE EPISODE, MODERATE (MULTI): ICD-10-CM

## 2023-09-01 DIAGNOSIS — S06.9X0S DIFFICULTY CONTROLLING BEHAVIOR AS LATE EFFECT TRAUMATIC BRAIN INJURY (CMS-HCC): ICD-10-CM

## 2023-09-01 DIAGNOSIS — E66.09 CLASS 1 OBESITY DUE TO EXCESS CALORIES WITH SERIOUS COMORBIDITY AND BODY MASS INDEX (BMI) OF 30.0 TO 30.9 IN ADULT: ICD-10-CM

## 2023-09-01 DIAGNOSIS — R27.0 ATAXIA: ICD-10-CM

## 2023-09-01 PROBLEM — S40.011A CONTUSION OF RIGHT SHOULDER: Status: RESOLVED | Noted: 2023-08-07 | Resolved: 2023-09-01

## 2023-09-01 PROBLEM — M71.162: Status: RESOLVED | Noted: 2019-02-20 | Resolved: 2023-09-01

## 2023-09-01 PROBLEM — E87.6 HYPOKALEMIA: Status: RESOLVED | Noted: 2023-03-15 | Resolved: 2023-09-01

## 2023-09-01 PROBLEM — F41.9 ANXIETY: Status: RESOLVED | Noted: 2023-03-15 | Resolved: 2023-09-01

## 2023-09-01 PROBLEM — I62.9 INTRACRANIAL BLEED (MULTI): Status: RESOLVED | Noted: 2022-10-01 | Resolved: 2023-09-01

## 2023-09-01 PROBLEM — R51.9 HEADACHE: Status: RESOLVED | Noted: 2023-03-15 | Resolved: 2023-09-01

## 2023-09-01 PROBLEM — M20.21 ACQUIRED HALLUX RIGIDUS OF RIGHT FOOT: Status: RESOLVED | Noted: 2023-05-27 | Resolved: 2023-09-01

## 2023-09-01 PROBLEM — M25.362 INSTABILITY OF LEFT KNEE JOINT: Status: RESOLVED | Noted: 2023-03-15 | Resolved: 2023-09-01

## 2023-09-01 LAB
ALANINE AMINOTRANSFERASE (SGPT) (U/L) IN SER/PLAS: 19 U/L (ref 10–52)
ALBUMIN (G/DL) IN SER/PLAS: 4.6 G/DL (ref 3.4–5)
ALKALINE PHOSPHATASE (U/L) IN SER/PLAS: 56 U/L (ref 33–136)
ANION GAP IN SER/PLAS: 11 MMOL/L (ref 10–20)
APPEARANCE, URINE: ABNORMAL
ASPARTATE AMINOTRANSFERASE (SGOT) (U/L) IN SER/PLAS: 19 U/L (ref 9–39)
BILIRUBIN TOTAL (MG/DL) IN SER/PLAS: 0.5 MG/DL (ref 0–1.2)
BILIRUBIN, URINE: NEGATIVE
BLOOD, URINE: NEGATIVE
CALCIUM (MG/DL) IN SER/PLAS: 10.1 MG/DL (ref 8.6–10.3)
CARBON DIOXIDE, TOTAL (MMOL/L) IN SER/PLAS: 29 MMOL/L (ref 21–32)
CHLORIDE (MMOL/L) IN SER/PLAS: 103 MMOL/L (ref 98–107)
COLOR, URINE: ABNORMAL
CREATININE (MG/DL) IN SER/PLAS: 1.1 MG/DL (ref 0.5–1.3)
GFR MALE: 75 ML/MIN/1.73M2
GLUCOSE (MG/DL) IN SER/PLAS: 97 MG/DL (ref 74–99)
GLUCOSE, URINE: NEGATIVE MG/DL
HYALINE CASTS, URINE: ABNORMAL /LPF
KETONES, URINE: ABNORMAL MG/DL
LEUKOCYTE ESTERASE, URINE: ABNORMAL
NITRITE, URINE: NEGATIVE
PH, URINE: 5 (ref 5–8)
POTASSIUM (MMOL/L) IN SER/PLAS: 5.1 MMOL/L (ref 3.5–5.3)
PROTEIN TOTAL: 6.7 G/DL (ref 6.4–8.2)
PROTEIN, URINE: NEGATIVE MG/DL
RBC, URINE: ABNORMAL /HPF (ref 0–5)
SODIUM (MMOL/L) IN SER/PLAS: 138 MMOL/L (ref 136–145)
SPECIFIC GRAVITY, URINE: 1.03 (ref 1–1.03)
SQUAMOUS EPITHELIAL CELLS, URINE: <1 /HPF
UREA NITROGEN (MG/DL) IN SER/PLAS: 28 MG/DL (ref 6–23)
UROBILINOGEN, URINE: <2 MG/DL (ref 0–1.9)
WBC, URINE: ABNORMAL /HPF (ref 0–5)

## 2023-09-01 PROCEDURE — 3008F BODY MASS INDEX DOCD: CPT | Performed by: FAMILY MEDICINE

## 2023-09-01 PROCEDURE — 99215 OFFICE O/P EST HI 40 MIN: CPT | Performed by: FAMILY MEDICINE

## 2023-09-01 PROCEDURE — 81001 URINALYSIS AUTO W/SCOPE: CPT

## 2023-09-01 PROCEDURE — 3074F SYST BP LT 130 MM HG: CPT | Performed by: FAMILY MEDICINE

## 2023-09-01 PROCEDURE — 80053 COMPREHEN METABOLIC PANEL: CPT

## 2023-09-01 PROCEDURE — 36415 COLL VENOUS BLD VENIPUNCTURE: CPT

## 2023-09-01 PROCEDURE — 1036F TOBACCO NON-USER: CPT | Performed by: FAMILY MEDICINE

## 2023-09-01 PROCEDURE — 3051F HG A1C>EQUAL 7.0%<8.0%: CPT | Performed by: FAMILY MEDICINE

## 2023-09-01 PROCEDURE — 3079F DIAST BP 80-89 MM HG: CPT | Performed by: FAMILY MEDICINE

## 2023-09-01 RX ORDER — HYDROCODONE BITARTRATE AND ACETAMINOPHEN 10; 325 MG/1; MG/1
1 TABLET ORAL 3 TIMES DAILY
Qty: 90 TABLET | Refills: 0 | Status: SHIPPED
Start: 2023-09-01 | End: 2023-09-08 | Stop reason: SDUPTHER

## 2023-09-01 NOTE — PATIENT INSTRUCTIONS
Hold all vitamins and meloxicam for a week prior to surgery  Hold irbesartan hydrocholorothiazide and Trilipix  on day of surgery  Take Keppra and Cymbalta on day of surgery   Take usual night time medicines with a sip of water the night before

## 2023-09-01 NOTE — PROGRESS NOTES
Subjective   Patient ID: Kenneth Valenzuela is a 63 y.o. male who presents for Med Management (Preop Clearance for right reverse total shoulder 9/25/23 Dr. Brito).    HPI To have RSR on right side on September 25. Here for clearence for that. He remains active with limits from ataxia. Chronic medical issues as follows.     Allergic rhinitis - has been on INS and helps. A lot of mucus in AM from nose. No longer on supplement Balance of Nature.. But on vitamins      BPH - had a Urolift procedure on September 3, 2020. Did help for awhile but no longer. Able to retain fluid with nocturia X 2-3. .No burning. Flow was good and larger amounts but now diminishing.  No medications.      Cervical spondylosis with myelopathy and radiculopathy - hard to tell if myelopathy causing his imbalance or if due to TBI. Using Norco pretty regularly as pain seems to be more daily intolerable.  Pain levels 1 to 10 Med drops it by 7 for 2 hours. The shoulder is the most painful. So hopefully that will get better.  The left hip is quite painful also. Has had injection from Rosy Leung in the left shoulder that helped.  As did one in hip. Dr Brito at [facility] is doing the shoulder  With med for a couple hours is able to do more ADLs and easier walking.  He is on 3 in a day of the [place] 5.   Home traction unit helps the back. Mobic helps some as he noted a lot worse when off of it.  Would like to do more in yard but balance and pain limit. Does mow lawn on riding mower.  Average MME of 16 now.  So will increase to 10 mg tid at least till surgery.      Closed traumatic brain injury - falls as balance is getting worse since he had the seizures in 2018. Aricept for the memory did not help. Has tried CBD gummies and says they help balance. But aware he cannot get Norco and stay on those. Dr Perez did surgery on the brain for SDH.  He is having more spasticity. Dropping silverware. Pain in hands with writing.  Dr Machado did EMG showing CTS.   Was  to see Pain management in [place] but that has not been there.      Depression and anxiety - He is on Seroquel 25 at 2 qhs.. Dries out mouth but not as bad as Risperdal. Keeps working on not raising his voice and Cymbalta 30 and 60. Dr Smith managed TBI but no longer.      Hyperlipidemia - . On Trilipix and still has high TG at 233 in December but down from upper 300s before that.      Hypertension - meds work well without side effects. Weight is up a bit. Apple Cider vinegar helps . No Chest pain, Dyspnea, palpitations, numbness, weakness, edema, claudications, or new double vision/ loss of vision.      Impaired fasting glucose - BS now DM at 139 and A1C of 7.6  last time and has done well with diet.   Home glucose rarely checked.  Low to mid 100s. GFR over 90     Male erectile disorder - Viagra works well. Wife is not interested.      MIGRAINE - better on current meds. Sumatriptan works well. Trileptal. Aura is a stiff neck. Traction helped this as well and not needing it now. Will use Icy Hot and does seem to help     Obesity - stable.  BMI 30.  Diet with lesser portions and more greens not as much now.      Osteoarthritis of both knees with TKR Left. The left knee will pop if he kneels. Limits kneeling. Feels more sloppy in the patella.      Primary osteoarthritis of both hips - was better on the left with injection of Kenalog. Probably needs THR.  Would like an Xray.      Seizure - Last seizure in October. with admission. . Went off of seizure meds. So now back on Keppra. Doing well. Driving per Dr Perez.. Had subdural from fall and that has stabilized.      Snores and talks in sleep but no gasping      I have personally reviewed the patients OARRS report. This report is filed in the EHR. I have considered the risks of abuse, addiction and diversion. I believe it is clinically appropriate to continue to prescribe this medication. Consistent with the history      CSA 2/28/23  UDS 3/4/21 as expected for  medication profile 3/7/22 as expected for medication profile 2/28/23 as expected for medication profile   PSA 12/1/22  Colonoscopy 11/18/15        Review of Systems    Objective   /80 (BP Location: Left arm, Patient Position: Sitting)   Pulse 83   Wt 85.7 kg (189 lb)   SpO2 97%   BMI 30.51 kg/m²     Physical Exam  General: Alert and oriented, No acute distress. Unsteady with chair to table   Eye: Pupils are equal, round and reactive to light, Extraocular movements are intact, Normal conjunctiva.. No nystagmus.   ENT clear   Neck: Supple, Non-tender, No carotid bruit, No jugular venous distention, No lymphadenopathy, No thyromegaly.    Carotid pulse: Bilaterally, Strong.   Respiratory: Lungs are clear to auscultation, Respirations are non-labored, Breath sounds are equal, Symmetrical chest wall expansion.   Cardiovascular: Normal rate, Regular rhythm, No murmur, No gallop, Good pulses equal in all extremities, No edema.   Gastrointestinal: Soft, Non-tender, Non-distended, No organomegaly.    Bowel sounds: All four quadrants, Present, No bruit present.   Integumentary: Warm, Dry, Pink. abrasion on right knee.   Psychiatric: Cooperative, Appropriate mood & affect, Normal judgment.    Behavior: No pressured speech.    Judgment: Able to make sensible decisions.    Thought process: Appropriate.    Unsteady with walking and getting from sit to stand   Low back with no tenderness.  HIps with no IR and minimal ER    Right ankle in brace.     Assessment/Plan   Problem List Items Addressed This Visit       Allergic rhinitis    Ataxia    Balance disturbance due to old head injury - Primary    Behavior-irritability    Benign prostatic hyperplasia with lower urinary tract symptoms    Cervical arthritis with myelopathy    Relevant Medications    HYDROcodone-acetaminophen (Norco)  mg tablet    Class 1 obesity due to excess calories with serious comorbidity and body mass index (BMI) of 30.0 to 30.9 in adult     Cognitive deficit as late effect of traumatic brain injury (CMS/HCC)    Controlled type 2 diabetes mellitus without complication, without long-term current use of insulin (CMS/HCC)    Depression, major, single episode, moderate (CMS/HCC)    Difficulty controlling behavior as late effect traumatic brain injury (CMS/HCC)    Falls frequently    Hyperlipidemia    Hypertension    Impingement syndrome of right shoulder    Pre-operative cardiovascular examination    Relevant Orders    Comprehensive Metabolic Panel    ECG 12 lead    Primary osteoarthritis of both hips    Relevant Orders    XR hip left 2 or 3 views    Seizure (CMS/HCC)    Subdural hematoma (CMS/HCC)     He is cleared for surgery assuming EKG and CMP and UA unremarkable.     9/5/23  EKG and labs look good. Can proceed with surgery as planned

## 2023-09-05 ENCOUNTER — TELEPHONE (OUTPATIENT)
Dept: PRIMARY CARE | Facility: CLINIC | Age: 63
End: 2023-09-05

## 2023-09-05 NOTE — RESULT ENCOUNTER NOTE
Let  him know the urine, kidney, sugar, and liver all look good.  We are awaiting the EKG to give final approval for surgery.. Be sure he did that .

## 2023-09-06 ENCOUNTER — APPOINTMENT (OUTPATIENT)
Dept: PRIMARY CARE | Facility: CLINIC | Age: 63
End: 2023-09-06

## 2023-09-06 ENCOUNTER — TELEPHONE (OUTPATIENT)
Dept: PRIMARY CARE | Facility: CLINIC | Age: 63
End: 2023-09-06

## 2023-09-08 ENCOUNTER — TELEPHONE (OUTPATIENT)
Dept: PRIMARY CARE | Facility: CLINIC | Age: 63
End: 2023-09-08

## 2023-09-08 DIAGNOSIS — M47.12 CERVICAL ARTHRITIS WITH MYELOPATHY: ICD-10-CM

## 2023-09-08 RX ORDER — HYDROCODONE BITARTRATE AND ACETAMINOPHEN 10; 325 MG/1; MG/1
1 TABLET ORAL 3 TIMES DAILY
Qty: 90 TABLET | Refills: 0 | Status: SHIPPED | OUTPATIENT
Start: 2023-09-08 | End: 2023-10-08 | Stop reason: WASHOUT

## 2023-09-08 NOTE — TELEPHONE ENCOUNTER
Pt called and said that she needed a refill on [place]. He said that he needs it soon because he is going out of town.   Please call and let pt know if this can be done   Thank you

## 2023-09-08 NOTE — TELEPHONE ENCOUNTER
Talked with pharmacy and he said pt. Told him that dr. Burleson increased the Norco 5's to 2 tid and we sent in the 10's 1 tid.  Wanted to verify that information is correct.  I checked with Dr. Burleson and that is correct, so pt. Should be due to get new Rx filled today.

## 2023-09-12 ENCOUNTER — TELEPHONE (OUTPATIENT)
Dept: PRIMARY CARE | Facility: CLINIC | Age: 63
End: 2023-09-12

## 2023-09-12 NOTE — TELEPHONE ENCOUNTER
[facility] calling to report that they adrienne lab work and some results were outside the normal range, they would like past labs faxed to [facility] at [phone] comparison

## 2023-09-29 ENCOUNTER — HOSPITAL ENCOUNTER (INPATIENT)
Age: 63
LOS: 14 days | Discharge: HOME HEALTH | DRG: 561 | End: 2023-10-13
Payer: COMMERCIAL

## 2023-09-29 VITALS — BODY MASS INDEX: 29.9 KG/M2 | BODY MASS INDEX: 30.6 KG/M2 | BODY MASS INDEX: 29.8 KG/M2

## 2023-09-29 VITALS — OXYGEN SATURATION: 93 % | RESPIRATION RATE: 16 BRPM

## 2023-09-29 VITALS
DIASTOLIC BLOOD PRESSURE: 74 MMHG | TEMPERATURE: 97.9 F | OXYGEN SATURATION: 98 % | RESPIRATION RATE: 16 BRPM | HEART RATE: 114 BPM | SYSTOLIC BLOOD PRESSURE: 117 MMHG

## 2023-09-29 DIAGNOSIS — E55.9: ICD-10-CM

## 2023-09-29 DIAGNOSIS — G40.909: ICD-10-CM

## 2023-09-29 DIAGNOSIS — Z79.82: ICD-10-CM
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
## 2023-09-29 DIAGNOSIS — Z79.899: ICD-10-CM

## 2023-09-29 DIAGNOSIS — F41.9: ICD-10-CM

## 2023-09-29 DIAGNOSIS — E53.8: ICD-10-CM

## 2023-09-29 DIAGNOSIS — E87.6: ICD-10-CM

## 2023-09-29 DIAGNOSIS — T17.998A: ICD-10-CM

## 2023-09-29 DIAGNOSIS — Z23: ICD-10-CM

## 2023-09-29 DIAGNOSIS — J30.9: ICD-10-CM

## 2023-09-29 DIAGNOSIS — Z87.891: ICD-10-CM

## 2023-09-29 DIAGNOSIS — Z47.1: Primary | ICD-10-CM

## 2023-09-29 DIAGNOSIS — Z96.611: ICD-10-CM

## 2023-09-29 DIAGNOSIS — N40.0: ICD-10-CM

## 2023-09-29 DIAGNOSIS — G47.00: ICD-10-CM

## 2023-09-29 DIAGNOSIS — I10: ICD-10-CM

## 2023-09-29 DIAGNOSIS — F32.A: ICD-10-CM

## 2023-09-29 PROCEDURE — 85025 COMPLETE CBC W/AUTO DIFF WBC: CPT

## 2023-09-29 PROCEDURE — 97535 SELF CARE MNGMENT TRAINING: CPT

## 2023-09-29 PROCEDURE — 87811 SARS-COV-2 COVID19 W/OPTIC: CPT

## 2023-09-29 PROCEDURE — 97162 PT EVAL MOD COMPLEX 30 MIN: CPT

## 2023-09-29 PROCEDURE — 36415 COLL VENOUS BLD VENIPUNCTURE: CPT

## 2023-09-29 PROCEDURE — 97116 GAIT TRAINING THERAPY: CPT

## 2023-09-29 PROCEDURE — 97110 THERAPEUTIC EXERCISES: CPT

## 2023-09-29 PROCEDURE — 92523 SPEECH SOUND LANG COMPREHEN: CPT

## 2023-09-29 PROCEDURE — 92507 TX SP LANG VOICE COMM INDIV: CPT

## 2023-09-29 PROCEDURE — 71046 X-RAY EXAM CHEST 2 VIEWS: CPT

## 2023-09-29 PROCEDURE — 97802 MEDICAL NUTRITION INDIV IN: CPT

## 2023-09-29 PROCEDURE — G0008 ADMIN INFLUENZA VIRUS VAC: HCPCS

## 2023-09-29 PROCEDURE — 80048 BASIC METABOLIC PNL TOTAL CA: CPT

## 2023-09-29 PROCEDURE — 97530 THERAPEUTIC ACTIVITIES: CPT

## 2023-09-29 PROCEDURE — 97166 OT EVAL MOD COMPLEX 45 MIN: CPT

## 2023-09-30 VITALS
RESPIRATION RATE: 17 BRPM | TEMPERATURE: 97.52 F | SYSTOLIC BLOOD PRESSURE: 118 MMHG | OXYGEN SATURATION: 95 % | DIASTOLIC BLOOD PRESSURE: 80 MMHG | HEART RATE: 119 BPM

## 2023-09-30 VITALS — HEART RATE: 119 BPM

## 2023-09-30 LAB
ANION GAP: 7 (ref 5–15)
BUN SERPL-MCNC: 18 MG/DL (ref 7–18)
BUN/CREAT RATIO: 20 RATIO (ref 10–20)
CALCIUM SERPL-MCNC: 9.6 MG/DL (ref 8.5–10.1)
CARBON DIOXIDE: 24 MMOL/L (ref 21–32)
CHLORIDE: 104 MMOL/L (ref 98–107)
DEPRECATED RDW RBC: 44.7 FL (ref 35.1–43.9)
ERYTHROCYTE [DISTWIDTH] IN BLOOD: 13.1 % (ref 11.6–14.6)
EST GLOM FILT RATE - AFR AMER: 109 ML/MIN (ref 60–?)
ESTIMATED CREATININE CLEARANCE: 74.83 ML/MIN
GLUCOSE: 145 MG/DL (ref 74–106)
HCT VFR BLD AUTO: 39.6 % (ref 40–54)
HEMOGLOBIN: 12.4 G/DL (ref 13–16.5)
HGB BLD-MCNC: 12.4 G/DL (ref 13–16.5)
IMMATURE GRANULOCYTES COUNT: 0.04 X10^3/UL (ref 0–0)
MCV RBC: 93 FL (ref 80–94)
MEAN CORP HGB CONC: 31.3 G/DL (ref 32–36)
MEAN PLATELET VOL.: 10.4 FL (ref 6.2–12)
NRBC FLAGGED BY ANALYZER: 0 % (ref 0–5)
PLATELET # BLD: 344 K/MM3 (ref 150–450)
PLATELET COUNT: 344 K/MM3 (ref 150–450)
POTASSIUM: 4.2 MMOL/L (ref 3.5–5.1)
RBC # BLD AUTO: 4.26 M/MM3 (ref 4.6–6.2)
RBC DISTRIBUTION WIDTH CV: 13.1 % (ref 11.6–14.6)
RBC DISTRIBUTION WIDTH SD: 44.7 FL (ref 35.1–43.9)
WBC # BLD AUTO: 6.7 K/MM3 (ref 4.4–11)
WHITE BLOOD COUNT: 6.7 K/MM3 (ref 4.4–11)

## 2023-10-01 VITALS
HEART RATE: 105 BPM | OXYGEN SATURATION: 93 % | TEMPERATURE: 97.88 F | SYSTOLIC BLOOD PRESSURE: 105 MMHG | RESPIRATION RATE: 17 BRPM | DIASTOLIC BLOOD PRESSURE: 87 MMHG

## 2023-10-01 VITALS
OXYGEN SATURATION: 95 % | DIASTOLIC BLOOD PRESSURE: 83 MMHG | SYSTOLIC BLOOD PRESSURE: 130 MMHG | RESPIRATION RATE: 16 BRPM | HEART RATE: 106 BPM | TEMPERATURE: 98.24 F

## 2023-10-01 VITALS — HEART RATE: 105 BPM

## 2023-10-02 VITALS — HEART RATE: 89 BPM | RESPIRATION RATE: 18 BRPM | OXYGEN SATURATION: 93 %

## 2023-10-02 VITALS
SYSTOLIC BLOOD PRESSURE: 109 MMHG | OXYGEN SATURATION: 94 % | HEART RATE: 98 BPM | TEMPERATURE: 97.34 F | DIASTOLIC BLOOD PRESSURE: 63 MMHG | RESPIRATION RATE: 16 BRPM

## 2023-10-02 VITALS — HEART RATE: 98 BPM

## 2023-10-03 VITALS — OXYGEN SATURATION: 94 % | HEART RATE: 98 BPM | RESPIRATION RATE: 18 BRPM

## 2023-10-03 VITALS
TEMPERATURE: 99.14 F | DIASTOLIC BLOOD PRESSURE: 81 MMHG | OXYGEN SATURATION: 99 % | HEART RATE: 123 BPM | SYSTOLIC BLOOD PRESSURE: 124 MMHG | RESPIRATION RATE: 16 BRPM

## 2023-10-03 VITALS — DIASTOLIC BLOOD PRESSURE: 86 MMHG | HEART RATE: 92 BPM | SYSTOLIC BLOOD PRESSURE: 140 MMHG

## 2023-10-04 VITALS
SYSTOLIC BLOOD PRESSURE: 120 MMHG | HEART RATE: 118 BPM | TEMPERATURE: 97.8 F | DIASTOLIC BLOOD PRESSURE: 67 MMHG | OXYGEN SATURATION: 96 % | RESPIRATION RATE: 16 BRPM

## 2023-10-04 VITALS — HEART RATE: 98 BPM | OXYGEN SATURATION: 99 % | RESPIRATION RATE: 16 BRPM

## 2023-10-04 VITALS — DIASTOLIC BLOOD PRESSURE: 91 MMHG | SYSTOLIC BLOOD PRESSURE: 130 MMHG | HEART RATE: 121 BPM

## 2023-10-05 VITALS — DIASTOLIC BLOOD PRESSURE: 86 MMHG | SYSTOLIC BLOOD PRESSURE: 129 MMHG | HEART RATE: 119 BPM

## 2023-10-05 VITALS
DIASTOLIC BLOOD PRESSURE: 79 MMHG | SYSTOLIC BLOOD PRESSURE: 116 MMHG | OXYGEN SATURATION: 96 % | TEMPERATURE: 97.5 F | HEART RATE: 105 BPM | RESPIRATION RATE: 15 BRPM

## 2023-10-06 VITALS
HEART RATE: 110 BPM | TEMPERATURE: 97.34 F | OXYGEN SATURATION: 96 % | RESPIRATION RATE: 18 BRPM | DIASTOLIC BLOOD PRESSURE: 81 MMHG | SYSTOLIC BLOOD PRESSURE: 110 MMHG

## 2023-10-06 VITALS — RESPIRATION RATE: 18 BRPM | OXYGEN SATURATION: 97 % | HEART RATE: 103 BPM

## 2023-10-06 VITALS — HEART RATE: 97 BPM | OXYGEN SATURATION: 98 % | RESPIRATION RATE: 16 BRPM

## 2023-10-06 VITALS — HEART RATE: 117 BPM | SYSTOLIC BLOOD PRESSURE: 121 MMHG | DIASTOLIC BLOOD PRESSURE: 82 MMHG

## 2023-10-07 VITALS
RESPIRATION RATE: 16 BRPM | OXYGEN SATURATION: 97 % | TEMPERATURE: 97.34 F | SYSTOLIC BLOOD PRESSURE: 133 MMHG | DIASTOLIC BLOOD PRESSURE: 76 MMHG | HEART RATE: 62 BPM

## 2023-10-07 VITALS — HEART RATE: 109 BPM | SYSTOLIC BLOOD PRESSURE: 115 MMHG | DIASTOLIC BLOOD PRESSURE: 83 MMHG

## 2023-10-07 VITALS — HEART RATE: 107 BPM | RESPIRATION RATE: 18 BRPM

## 2023-10-07 LAB
ANION GAP: 5 (ref 5–15)
BUN SERPL-MCNC: 17 MG/DL (ref 7–18)
BUN/CREAT RATIO: 15.6 RATIO (ref 10–20)
CALCIUM SERPL-MCNC: 10 MG/DL (ref 8.5–10.1)
CARBON DIOXIDE: 28 MMOL/L (ref 21–32)
CHLORIDE: 104 MMOL/L (ref 98–107)
DEPRECATED RDW RBC: 46.5 FL (ref 35.1–43.9)
ERYTHROCYTE [DISTWIDTH] IN BLOOD: 13.2 % (ref 11.6–14.6)
EST GLOM FILT RATE - AFR AMER: 87 ML/MIN (ref 60–?)
ESTIMATED CREATININE CLEARANCE: 61.78 ML/MIN
GLUCOSE: 206 MG/DL (ref 74–106)
HCT VFR BLD AUTO: 41.4 % (ref 40–54)
HEMOGLOBIN: 12.5 G/DL (ref 13–16.5)
HGB BLD-MCNC: 12.5 G/DL (ref 13–16.5)
IMMATURE GRANULOCYTES COUNT: 0.06 X10^3/UL (ref 0–0)
MCV RBC: 97 FL (ref 80–94)
MEAN CORP HGB CONC: 30.2 G/DL (ref 32–36)
MEAN PLATELET VOL.: 9.9 FL (ref 6.2–12)
NRBC FLAGGED BY ANALYZER: 0 % (ref 0–5)
PLATELET # BLD: 447 K/MM3 (ref 150–450)
PLATELET COUNT: 447 K/MM3 (ref 150–450)
POTASSIUM: 5 MMOL/L (ref 3.5–5.1)
RBC # BLD AUTO: 4.27 M/MM3 (ref 4.6–6.2)
RBC DISTRIBUTION WIDTH CV: 13.2 % (ref 11.6–14.6)
RBC DISTRIBUTION WIDTH SD: 46.5 FL (ref 35.1–43.9)
WBC # BLD AUTO: 5.6 K/MM3 (ref 4.4–11)
WHITE BLOOD COUNT: 5.6 K/MM3 (ref 4.4–11)

## 2023-10-08 VITALS
DIASTOLIC BLOOD PRESSURE: 81 MMHG | OXYGEN SATURATION: 94 % | SYSTOLIC BLOOD PRESSURE: 113 MMHG | HEART RATE: 94 BPM | RESPIRATION RATE: 16 BRPM | TEMPERATURE: 97.7 F

## 2023-10-08 VITALS — DIASTOLIC BLOOD PRESSURE: 81 MMHG | HEART RATE: 94 BPM | SYSTOLIC BLOOD PRESSURE: 113 MMHG

## 2023-10-09 VITALS
HEART RATE: 109 BPM | OXYGEN SATURATION: 97 % | SYSTOLIC BLOOD PRESSURE: 125 MMHG | TEMPERATURE: 97.52 F | DIASTOLIC BLOOD PRESSURE: 80 MMHG | RESPIRATION RATE: 17 BRPM

## 2023-10-09 VITALS — HEART RATE: 109 BPM

## 2023-10-10 VITALS
TEMPERATURE: 97.52 F | HEART RATE: 115 BPM | DIASTOLIC BLOOD PRESSURE: 72 MMHG | OXYGEN SATURATION: 96 % | RESPIRATION RATE: 10 BRPM | SYSTOLIC BLOOD PRESSURE: 118 MMHG

## 2023-10-10 VITALS — DIASTOLIC BLOOD PRESSURE: 74 MMHG | SYSTOLIC BLOOD PRESSURE: 121 MMHG | HEART RATE: 116 BPM

## 2023-10-10 VITALS — RESPIRATION RATE: 16 BRPM | HEART RATE: 106 BPM | OXYGEN SATURATION: 96 %

## 2023-10-11 VITALS
TEMPERATURE: 97.16 F | RESPIRATION RATE: 16 BRPM | HEART RATE: 107 BPM | OXYGEN SATURATION: 94 % | SYSTOLIC BLOOD PRESSURE: 116 MMHG | DIASTOLIC BLOOD PRESSURE: 80 MMHG

## 2023-10-11 VITALS — HEART RATE: 135 BPM | DIASTOLIC BLOOD PRESSURE: 33 MMHG | SYSTOLIC BLOOD PRESSURE: 153 MMHG

## 2023-10-12 ENCOUNTER — TELEPHONE (OUTPATIENT)
Dept: PRIMARY CARE | Facility: CLINIC | Age: 63
End: 2023-10-12

## 2023-10-12 VITALS
DIASTOLIC BLOOD PRESSURE: 82 MMHG | HEART RATE: 119 BPM | SYSTOLIC BLOOD PRESSURE: 137 MMHG | RESPIRATION RATE: 17 BRPM | TEMPERATURE: 98.42 F | OXYGEN SATURATION: 94 %

## 2023-10-12 VITALS — HEART RATE: 119 BPM

## 2023-10-12 VITALS
HEART RATE: 60 BPM | TEMPERATURE: 97.34 F | SYSTOLIC BLOOD PRESSURE: 116 MMHG | OXYGEN SATURATION: 96 % | RESPIRATION RATE: 16 BRPM | DIASTOLIC BLOOD PRESSURE: 83 MMHG

## 2023-10-12 NOTE — TELEPHONE ENCOUNTER
Tessie from [facility] asking for immunization history, mostly for the pneumonia vaccine. Can call [phone]

## 2023-10-12 NOTE — TELEPHONE ENCOUNTER
Returned pc to [facility] and let her know patient had the Pneumovax 23 on 11/5/18

## 2023-10-13 ENCOUNTER — TELEPHONE (OUTPATIENT)
Dept: PRIMARY CARE | Facility: CLINIC | Age: 63
End: 2023-10-13

## 2023-10-13 VITALS — HEART RATE: 110 BPM | RESPIRATION RATE: 16 BRPM | OXYGEN SATURATION: 96 %

## 2023-10-13 VITALS — HEART RATE: 110 BPM | RESPIRATION RATE: 16 BRPM | OXYGEN SATURATION: 95 %

## 2023-10-13 VITALS — OXYGEN SATURATION: 94 %

## 2023-10-13 VITALS — HEART RATE: 123 BPM

## 2023-10-13 DIAGNOSIS — R05.1 ACUTE COUGH: ICD-10-CM

## 2023-10-13 RX ORDER — BENZONATATE 200 MG/1
200 CAPSULE ORAL 3 TIMES DAILY PRN
Qty: 30 CAPSULE | Refills: 0 | Status: SHIPPED | OUTPATIENT
Start: 2023-10-13 | End: 2023-10-23

## 2023-10-13 RX ORDER — IRBESARTAN 75 MG/1
75 TABLET ORAL DAILY
COMMUNITY
Start: 2023-09-16 | End: 2023-12-15 | Stop reason: ALTCHOICE

## 2023-10-13 RX ORDER — BENZONATATE 200 MG/1
200 CAPSULE ORAL 3 TIMES DAILY PRN
COMMUNITY
End: 2023-10-13 | Stop reason: SDUPTHER

## 2023-10-13 NOTE — TELEPHONE ENCOUNTER
Pt wife stated [facility] - sent home with a nasty cough and sore throat- would like stronger cough syrup then OTC sent to [facility]

## 2023-10-13 NOTE — TELEPHONE ENCOUNTER
Pc to pt. Wife and let her know all cough syrups contain opiates which he already takes.  Can Rx Tessalon tid x10 days.  She agreed to that and I proposed Rx

## 2023-10-17 ENCOUNTER — OFFICE VISIT (OUTPATIENT)
Dept: PRIMARY CARE | Facility: CLINIC | Age: 63
End: 2023-10-17
Payer: COMMERCIAL

## 2023-10-17 VITALS
DIASTOLIC BLOOD PRESSURE: 80 MMHG | SYSTOLIC BLOOD PRESSURE: 130 MMHG | HEART RATE: 94 BPM | OXYGEN SATURATION: 99 % | TEMPERATURE: 99.8 F

## 2023-10-17 DIAGNOSIS — J20.9 ACUTE BRONCHITIS, UNSPECIFIED ORGANISM: Primary | ICD-10-CM

## 2023-10-17 PROCEDURE — 4010F ACE/ARB THERAPY RXD/TAKEN: CPT | Performed by: FAMILY MEDICINE

## 2023-10-17 PROCEDURE — 1036F TOBACCO NON-USER: CPT | Performed by: FAMILY MEDICINE

## 2023-10-17 PROCEDURE — 3008F BODY MASS INDEX DOCD: CPT | Performed by: FAMILY MEDICINE

## 2023-10-17 PROCEDURE — 3051F HG A1C>EQUAL 7.0%<8.0%: CPT | Performed by: FAMILY MEDICINE

## 2023-10-17 PROCEDURE — 99213 OFFICE O/P EST LOW 20 MIN: CPT | Performed by: FAMILY MEDICINE

## 2023-10-17 PROCEDURE — 3075F SYST BP GE 130 - 139MM HG: CPT | Performed by: FAMILY MEDICINE

## 2023-10-17 PROCEDURE — 3079F DIAST BP 80-89 MM HG: CPT | Performed by: FAMILY MEDICINE

## 2023-10-17 RX ORDER — ALBUTEROL SULFATE 90 UG/1
2 AEROSOL, METERED RESPIRATORY (INHALATION) EVERY 6 HOURS PRN
Qty: 8 G | Refills: 1 | Status: SHIPPED | OUTPATIENT
Start: 2023-10-17 | End: 2024-02-06 | Stop reason: SDUPTHER

## 2023-10-17 RX ORDER — AZITHROMYCIN 250 MG/1
TABLET, FILM COATED ORAL
Qty: 6 TABLET | Refills: 0 | Status: SHIPPED | OUTPATIENT
Start: 2023-10-17 | End: 2023-10-22

## 2023-10-17 ASSESSMENT — ENCOUNTER SYMPTOMS: COUGH: 1

## 2023-10-17 NOTE — PROGRESS NOTES
Subjective   Patient ID: Kenneth Valenzuela is a 63 y.o. male who presents for Cough (Follow up shoulder replacement).    Cough       Had RSR on right shoulder on September 25. In hospital for 4 days. Then to TCU at [place] for 2 weeks. Has PT at home   No complications with the surgery. No pain so far.   Developed a cough 5 days ago   Short of breath not noted  That is better but cough persists  Cannot stop the cough  Productive of gray mucus  No fever or chills  No SN, RN, PND change   Wheezing in Upper airways  Has had in past but not asthma.   No inhaler  But has used incentive spirometry.   Mucinex for thick mucus     Review of Systems   Respiratory:  Positive for cough.        Objective   /80 (BP Location: Left arm, Patient Position: Sitting)   Pulse 94   Temp 37.7 °C (99.8 °F) (Oral)   SpO2 99%     Physical Exam  Constitutional:       Appearance: Normal appearance.   HENT:      Head: Normocephalic.      Right Ear: Tympanic membrane, ear canal and external ear normal.      Left Ear: Tympanic membrane, ear canal and external ear normal.      Nose: Nose normal.      Mouth/Throat:      Mouth: Mucous membranes are moist.      Pharynx: Oropharynx is clear.   Eyes:      Extraocular Movements: Extraocular movements intact.      Conjunctiva/sclera: Conjunctivae normal.      Pupils: Pupils are equal, round, and reactive to light.   Cardiovascular:      Rate and Rhythm: Normal rate and regular rhythm.   Pulmonary:      Effort: Pulmonary effort is normal. No respiratory distress.      Breath sounds: Wheezing and rhonchi present.   Musculoskeletal:      Cervical back: Normal range of motion and neck supple.   Neurological:      General: No focal deficit present.      Mental Status: He is alert and oriented to person, place, and time.   Psychiatric:         Mood and Affect: Mood normal.         Behavior: Behavior normal.         Thought Content: Thought content normal.         Judgment: Judgment normal.          Assessment/Plan   Problem List Items Addressed This Visit    None  Visit Diagnoses         Codes    Acute bronchitis, unspecified organism    -  Primary J20.9    Relevant Medications    albuterol (Ventolin HFA) 90 mcg/actuation inhaler    azithromycin (Zithromax) 250 mg tablet

## 2023-10-23 ENCOUNTER — TELEPHONE (OUTPATIENT)
Dept: PRIMARY CARE | Facility: CLINIC | Age: 63
End: 2023-10-23

## 2023-10-23 DIAGNOSIS — M47.12 CERVICAL ARTHRITIS WITH MYELOPATHY: ICD-10-CM

## 2023-10-23 RX ORDER — HYDROCODONE BITARTRATE AND ACETAMINOPHEN 5; 325 MG/1; MG/1
1 TABLET ORAL EVERY 4 HOURS PRN
Qty: 120 TABLET | Refills: 0 | Status: SHIPPED | OUTPATIENT
Start: 2023-10-23 | End: 2023-11-21 | Stop reason: SDUPTHER

## 2023-10-23 RX ORDER — HYDROCODONE BITARTRATE AND ACETAMINOPHEN 5; 325 MG/1; MG/1
1 TABLET ORAL EVERY 4 HOURS PRN
COMMUNITY
End: 2023-10-23 | Stop reason: SDUPTHER

## 2023-11-02 ENCOUNTER — TELEPHONE (OUTPATIENT)
Dept: PRIMARY CARE | Facility: CLINIC | Age: 63
End: 2023-11-02

## 2023-11-02 DIAGNOSIS — J01.91 ACUTE RECURRENT SINUSITIS, UNSPECIFIED LOCATION: Primary | ICD-10-CM

## 2023-11-02 RX ORDER — AZITHROMYCIN 250 MG/1
TABLET, FILM COATED ORAL
Qty: 6 TABLET | Refills: 0 | Status: SHIPPED | OUTPATIENT
Start: 2023-11-02 | End: 2023-11-07

## 2023-11-02 RX ORDER — POTASSIUM CHLORIDE 750 MG/1
10 CAPSULE, EXTENDED RELEASE ORAL DAILY
COMMUNITY
Start: 2023-10-14 | End: 2024-02-06 | Stop reason: SDUPTHER

## 2023-11-21 ENCOUNTER — TELEPHONE (OUTPATIENT)
Dept: PRIMARY CARE | Facility: CLINIC | Age: 63
End: 2023-11-21

## 2023-11-21 DIAGNOSIS — M47.12 CERVICAL ARTHRITIS WITH MYELOPATHY: ICD-10-CM

## 2023-11-21 RX ORDER — HYDROCODONE BITARTRATE AND ACETAMINOPHEN 5; 325 MG/1; MG/1
1 TABLET ORAL EVERY 4 HOURS PRN
Qty: 120 TABLET | Refills: 0 | Status: SHIPPED | OUTPATIENT
Start: 2023-11-21 | End: 2023-11-22 | Stop reason: WASHOUT

## 2023-11-22 ENCOUNTER — TELEPHONE (OUTPATIENT)
Dept: PRIMARY CARE | Facility: CLINIC | Age: 63
End: 2023-11-22

## 2023-11-22 DIAGNOSIS — M47.12 CERVICAL ARTHRITIS WITH MYELOPATHY: ICD-10-CM

## 2023-11-22 RX ORDER — HYDROCODONE BITARTRATE AND ACETAMINOPHEN 10; 325 MG/1; MG/1
1 TABLET ORAL 3 TIMES DAILY PRN
Qty: 90 TABLET | Refills: 0 | Status: SHIPPED | OUTPATIENT
Start: 2023-11-22 | End: 2023-12-15 | Stop reason: SDUPTHER

## 2023-11-22 NOTE — TELEPHONE ENCOUNTER
Needing refill of norco sent to [facility]. Would like it switched to the 10 mg up to 3 times a day. Stating the 5 mg is too inconvenient and thinks he takes more.

## 2023-11-27 DIAGNOSIS — J20.9 ACUTE BRONCHITIS, UNSPECIFIED ORGANISM: Primary | ICD-10-CM

## 2023-11-27 RX ORDER — DOXYCYCLINE 100 MG/1
100 CAPSULE ORAL 2 TIMES DAILY
Qty: 14 CAPSULE | Refills: 0 | Status: SHIPPED | OUTPATIENT
Start: 2023-11-27 | End: 2023-12-04

## 2023-11-27 NOTE — TELEPHONE ENCOUNTER
Pc to patient and let him know dr echevarria made the change to his Norco.    Patient stated he's still having a problem with congestion.  It's only on the right side.  He can blow out a lot of mucus that's opaque/yellow in color.  Wants to know if you can Rx something?  He took Zpack previously but that didn't help.  He does have a Penicillin allergy

## 2023-12-04 ENCOUNTER — APPOINTMENT (OUTPATIENT)
Dept: RADIOLOGY | Facility: HOSPITAL | Age: 63
End: 2023-12-04
Payer: COMMERCIAL

## 2023-12-04 ENCOUNTER — HOSPITAL ENCOUNTER (EMERGENCY)
Facility: HOSPITAL | Age: 63
Discharge: OTHER INSTITUTION | End: 2023-12-04
Attending: EMERGENCY MEDICINE
Payer: COMMERCIAL

## 2023-12-04 VITALS
HEIGHT: 66 IN | DIASTOLIC BLOOD PRESSURE: 100 MMHG | WEIGHT: 183 LBS | SYSTOLIC BLOOD PRESSURE: 135 MMHG | HEART RATE: 134 BPM | BODY MASS INDEX: 29.41 KG/M2 | OXYGEN SATURATION: 95 % | TEMPERATURE: 96.2 F | RESPIRATION RATE: 16 BRPM

## 2023-12-04 DIAGNOSIS — K68.3 RETROPERITONEAL HEMATOMA: Primary | ICD-10-CM

## 2023-12-04 LAB
ABO GROUP (TYPE) IN BLOOD: NORMAL
ALBUMIN SERPL BCP-MCNC: 4.3 G/DL (ref 3.4–5)
ALP SERPL-CCNC: 77 U/L (ref 33–136)
ALT SERPL W P-5'-P-CCNC: 17 U/L (ref 10–52)
ANION GAP SERPL CALC-SCNC: 19 MMOL/L (ref 10–20)
ANTIBODY SCREEN: NORMAL
AST SERPL W P-5'-P-CCNC: 23 U/L (ref 9–39)
BASOPHILS # BLD AUTO: 0.03 X10*3/UL (ref 0–0.1)
BASOPHILS NFR BLD AUTO: 0.3 %
BILIRUB SERPL-MCNC: 0.6 MG/DL (ref 0–1.2)
BUN SERPL-MCNC: 15 MG/DL (ref 6–23)
CALCIUM SERPL-MCNC: 9.6 MG/DL (ref 8.6–10.3)
CHLORIDE SERPL-SCNC: 103 MMOL/L (ref 98–107)
CO2 SERPL-SCNC: 20 MMOL/L (ref 21–32)
CREAT SERPL-MCNC: 0.86 MG/DL (ref 0.5–1.3)
EOSINOPHIL # BLD AUTO: 0.32 X10*3/UL (ref 0–0.7)
EOSINOPHIL NFR BLD AUTO: 3.5 %
ERYTHROCYTE [DISTWIDTH] IN BLOOD BY AUTOMATED COUNT: 14.3 % (ref 11.5–14.5)
GFR SERPL CREATININE-BSD FRML MDRD: >90 ML/MIN/1.73M*2
GLUCOSE SERPL-MCNC: 235 MG/DL (ref 74–99)
HCT VFR BLD AUTO: 42.2 % (ref 41–52)
HGB BLD-MCNC: 13.2 G/DL (ref 13.5–17.5)
HOLD SPECIMEN: NORMAL
IMM GRANULOCYTES # BLD AUTO: 0.04 X10*3/UL (ref 0–0.7)
IMM GRANULOCYTES NFR BLD AUTO: 0.4 % (ref 0–0.9)
INR PPP: 1.3 (ref 0.9–1.1)
LACTATE SERPL-SCNC: 5.8 MMOL/L (ref 0.4–2)
LACTATE SERPL-SCNC: 6 MMOL/L (ref 0.4–2)
LACTATE SERPL-SCNC: 7.7 MMOL/L (ref 0.4–2)
LIPASE SERPL-CCNC: 24 U/L (ref 9–82)
LYMPHOCYTES # BLD AUTO: 2.45 X10*3/UL (ref 1.2–4.8)
LYMPHOCYTES NFR BLD AUTO: 26.8 %
MCH RBC QN AUTO: 29.4 PG (ref 26–34)
MCHC RBC AUTO-ENTMCNC: 31.3 G/DL (ref 32–36)
MCV RBC AUTO: 94 FL (ref 80–100)
MONOCYTES # BLD AUTO: 0.73 X10*3/UL (ref 0.1–1)
MONOCYTES NFR BLD AUTO: 8 %
NEUTROPHILS # BLD AUTO: 5.58 X10*3/UL (ref 1.2–7.7)
NEUTROPHILS NFR BLD AUTO: 61 %
NRBC BLD-RTO: 0 /100 WBCS (ref 0–0)
PLATELET # BLD AUTO: 340 X10*3/UL (ref 150–450)
POTASSIUM SERPL-SCNC: 4.1 MMOL/L (ref 3.5–5.3)
PROT SERPL-MCNC: 6.6 G/DL (ref 6.4–8.2)
PROTHROMBIN TIME: 14.7 SECONDS (ref 9.8–12.8)
RBC # BLD AUTO: 4.49 X10*6/UL (ref 4.5–5.9)
RH FACTOR (ANTIGEN D): NORMAL
SODIUM SERPL-SCNC: 138 MMOL/L (ref 136–145)
WBC # BLD AUTO: 9.2 X10*3/UL (ref 4.4–11.3)

## 2023-12-04 PROCEDURE — 85025 COMPLETE CBC W/AUTO DIFF WBC: CPT | Performed by: NURSE PRACTITIONER

## 2023-12-04 PROCEDURE — 83605 ASSAY OF LACTIC ACID: CPT | Performed by: NURSE PRACTITIONER

## 2023-12-04 PROCEDURE — 36430 TRANSFUSION BLD/BLD COMPNT: CPT

## 2023-12-04 PROCEDURE — 96375 TX/PRO/DX INJ NEW DRUG ADDON: CPT

## 2023-12-04 PROCEDURE — 2500000004 HC RX 250 GENERAL PHARMACY W/ HCPCS (ALT 636 FOR OP/ED): Performed by: NURSE PRACTITIONER

## 2023-12-04 PROCEDURE — 80053 COMPREHEN METABOLIC PANEL: CPT | Performed by: NURSE PRACTITIONER

## 2023-12-04 PROCEDURE — 85610 PROTHROMBIN TIME: CPT | Performed by: NURSE PRACTITIONER

## 2023-12-04 PROCEDURE — 86850 RBC ANTIBODY SCREEN: CPT | Performed by: NURSE PRACTITIONER

## 2023-12-04 PROCEDURE — 36415 COLL VENOUS BLD VENIPUNCTURE: CPT | Performed by: NURSE PRACTITIONER

## 2023-12-04 PROCEDURE — 86920 COMPATIBILITY TEST SPIN: CPT

## 2023-12-04 PROCEDURE — 96374 THER/PROPH/DIAG INJ IV PUSH: CPT

## 2023-12-04 PROCEDURE — 87040 BLOOD CULTURE FOR BACTERIA: CPT | Mod: SAMLAB | Performed by: EMERGENCY MEDICINE

## 2023-12-04 PROCEDURE — 83690 ASSAY OF LIPASE: CPT | Performed by: NURSE PRACTITIONER

## 2023-12-04 PROCEDURE — 74176 CT ABD & PELVIS W/O CONTRAST: CPT | Performed by: RADIOLOGY

## 2023-12-04 PROCEDURE — 96361 HYDRATE IV INFUSION ADD-ON: CPT

## 2023-12-04 PROCEDURE — 2500000004 HC RX 250 GENERAL PHARMACY W/ HCPCS (ALT 636 FOR OP/ED): Performed by: EMERGENCY MEDICINE

## 2023-12-04 PROCEDURE — 99285 EMERGENCY DEPT VISIT HI MDM: CPT | Performed by: EMERGENCY MEDICINE

## 2023-12-04 PROCEDURE — 74176 CT ABD & PELVIS W/O CONTRAST: CPT

## 2023-12-04 PROCEDURE — 87075 CULTR BACTERIA EXCEPT BLOOD: CPT | Mod: SAMLAB | Performed by: EMERGENCY MEDICINE

## 2023-12-04 PROCEDURE — P9016 RBC LEUKOCYTES REDUCED: HCPCS

## 2023-12-04 RX ORDER — KETOROLAC TROMETHAMINE 30 MG/ML
15 INJECTION, SOLUTION INTRAMUSCULAR; INTRAVENOUS ONCE
Status: COMPLETED | OUTPATIENT
Start: 2023-12-04 | End: 2023-12-04

## 2023-12-04 RX ORDER — ONDANSETRON HYDROCHLORIDE 2 MG/ML
4 INJECTION, SOLUTION INTRAVENOUS ONCE
Status: COMPLETED | OUTPATIENT
Start: 2023-12-04 | End: 2023-12-04

## 2023-12-04 RX ORDER — METRONIDAZOLE 500 MG/100ML
500 INJECTION, SOLUTION INTRAVENOUS ONCE
Status: DISCONTINUED | OUTPATIENT
Start: 2023-12-04 | End: 2023-12-04 | Stop reason: HOSPADM

## 2023-12-04 RX ORDER — CEFEPIME 1 G/50ML
2 INJECTION, SOLUTION INTRAVENOUS ONCE
Status: DISCONTINUED | OUTPATIENT
Start: 2023-12-04 | End: 2023-12-04 | Stop reason: HOSPADM

## 2023-12-04 RX ORDER — MORPHINE SULFATE 4 MG/ML
4 INJECTION, SOLUTION INTRAMUSCULAR; INTRAVENOUS ONCE
Status: COMPLETED | OUTPATIENT
Start: 2023-12-04 | End: 2023-12-04

## 2023-12-04 RX ADMIN — MORPHINE SULFATE 4 MG: 4 INJECTION, SOLUTION INTRAMUSCULAR; INTRAVENOUS at 11:57

## 2023-12-04 RX ADMIN — HYDROMORPHONE HYDROCHLORIDE 0.5 MG: 1 INJECTION, SOLUTION INTRAMUSCULAR; INTRAVENOUS; SUBCUTANEOUS at 13:00

## 2023-12-04 RX ADMIN — KETOROLAC TROMETHAMINE 15 MG: 30 INJECTION, SOLUTION INTRAMUSCULAR at 11:56

## 2023-12-04 RX ADMIN — SODIUM CHLORIDE 1500 ML: 9 INJECTION, SOLUTION INTRAVENOUS at 12:30

## 2023-12-04 RX ADMIN — SODIUM CHLORIDE 1000 ML: 9 INJECTION, SOLUTION INTRAVENOUS at 11:55

## 2023-12-04 RX ADMIN — ONDANSETRON 4 MG: 2 INJECTION INTRAMUSCULAR; INTRAVENOUS at 12:23

## 2023-12-04 ASSESSMENT — PAIN SCALES - GENERAL
PAINLEVEL_OUTOF10: 10 - WORST POSSIBLE PAIN
PAINLEVEL_OUTOF10: 6

## 2023-12-04 ASSESSMENT — COLUMBIA-SUICIDE SEVERITY RATING SCALE - C-SSRS
6. HAVE YOU EVER DONE ANYTHING, STARTED TO DO ANYTHING, OR PREPARED TO DO ANYTHING TO END YOUR LIFE?: NO
2. HAVE YOU ACTUALLY HAD ANY THOUGHTS OF KILLING YOURSELF?: NO
1. IN THE PAST MONTH, HAVE YOU WISHED YOU WERE DEAD OR WISHED YOU COULD GO TO SLEEP AND NOT WAKE UP?: NO

## 2023-12-04 ASSESSMENT — PAIN DESCRIPTION - LOCATION: LOCATION: ABDOMEN

## 2023-12-04 ASSESSMENT — PAIN - FUNCTIONAL ASSESSMENT: PAIN_FUNCTIONAL_ASSESSMENT: 0-10

## 2023-12-04 NOTE — ED PROVIDER NOTES
Chief Complaint   Patient presents with    Abdominal Pain     To er per afd with c/o abd pain, n/v today. Denies diarrhea. Denies any urinary problems        Patient History    Past Medical History:   Diagnosis Date    Body mass index (BMI) 36.0-36.9, adult 03/04/2021    Body mass index (BMI) of 36.0 to 36.9 in adult    Impaired fasting glucose 03/04/2021    IFG (impaired fasting glucose)    Major depressive disorder, single episode, severe without psychotic features (CMS/HCC) 06/10/2021    Severe depression    Personal history of other diseases of the respiratory system 01/03/2022    History of acute sinusitis    Personal history of other specified conditions 02/08/2021    History of urinary urgency    Personal history of other specified conditions 06/10/2020    History of nocturia    Personal history of other specified conditions 02/08/2021    History of urinary frequency    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    Rib pain on right side    Unspecified fall, initial encounter 06/10/2021    Fall in home, initial encounter      Past Surgical History:   Procedure Laterality Date    CT ANGIO CORONARY ART WITH HEARTFLOW IF SCORE >30%  10/01/2022    CT HEART CORONARY ANGIOGRAM 10/1/2022    CT HEAD ANGIO W AND WO IV CONTRAST  10/01/2022    CT HEAD ANGIO W AND WO IV CONTRAST 10/1/2022    OTHER SURGICAL HISTORY  11/21/2019    Finger amputation    OTHER SURGICAL HISTORY  11/21/2019    Vasectomy    OTHER SURGICAL HISTORY  11/21/2019    Rotator cuff repair    OTHER SURGICAL HISTORY  11/21/2019    Surgery    OTHER SURGICAL HISTORY  11/21/2019    Knee replacement    OTHER SURGICAL HISTORY  09/14/2021    Prostate surgery    OTHER SURGICAL HISTORY  06/10/2021    Insertion of prostatic urethral lift implant    OTHER SURGICAL HISTORY  10/28/2022    Craniotomy    OTHER SURGICAL HISTORY  11/18/2015    Colonoscopy    REVERSE TOTAL SHOULDER ARTHROPLASTY Right 09/25/2023      Family History   Problem Relation Name Age of Onset    Diabetes Mother       COPD Mother      Hypertension Father      Heart attack Father      Diabetes Sister      Diabetes Other        Social History     Social History Narrative    Not on file      Allergies   Allergen Reactions    Penicillins Unknown    Phenytoin Hives, Rash and Unknown        PMH: Reviewed  PSH: Reviewed  Social History: Reviewed.   Allergies reviewed.     HPI: Kenneth Valenzuela is a 63 y.o. male who presents to the ED today accompanied by his wife with complaints of mid abdominal pain.  Pain started about 1 hour prior to arrival.  Had some dry heaves this morning, relieved by sucking on sebastian chews.  Pain then started after eating a piece of buttered toast.  Nausea and dry heaves returning after eating as well.  Denies fevers.  Normal bowel movement for him yesterday.  Concern for constipation with his narcotic use.  Denies urinary complaints.      REVIEW OF SYSTEMS:  All other systems reviewed and negative except as listed in HPI.    PHYSICAL EXAM:    GENERAL: Vitals noted, tachycardic and hypertensive, mild distress. Alert and oriented x 3. Non-toxic.      EENT: TMs clear. Posterior oropharynx unremarkable. EOMI, no nystagmus noted.     NECK: Supple. No masses. No midline tenderness. No meningeal signs.     CARDIAC: Regular rate, rhythm. No murmurs rubs or gallops. No JVD.    PULMONARY: Lungs clear and equal bilaterally. No wheezes rales or rhonchi. No respiratory distress.     ABDOMEN: Soft, nondistended, and diffusely tender. No peritoneal signs. Bowel sounds are present and normoactive in all 4 quadrants. No pulsatile masses.     EXTREMITIES: No peripheral edema.     SKIN: No rash. Warm, dry, and intact.     NEURO: No focal neurologic deficits.     Labs Reviewed   LACTATE - Abnormal       Result Value    Lactate 5.8 (*)     Narrative:     Venipuncture immediately after or during the administration of Metamizole may lead to falsely low results. Testing should be performed immediately  prior to Metamizole dosing.    COMPREHENSIVE METABOLIC PANEL - Abnormal    Glucose 235 (*)     Sodium 138      Potassium 4.1      Chloride 103      Bicarbonate 20 (*)     Anion Gap 19      Urea Nitrogen 15      Creatinine 0.86      eGFR >90      Calcium 9.6      Albumin 4.3      Alkaline Phosphatase 77      Total Protein 6.6      AST 23      Bilirubin, Total 0.6      ALT 17     CBC WITH AUTO DIFFERENTIAL - Abnormal    WBC 9.2      nRBC 0.0      RBC 4.49 (*)     Hemoglobin 13.2 (*)     Hematocrit 42.2      MCV 94      MCH 29.4      MCHC 31.3 (*)     RDW 14.3      Platelets 340      Neutrophils % 61.0      Immature Granulocytes %, Automated 0.4      Lymphocytes % 26.8      Monocytes % 8.0      Eosinophils % 3.5      Basophils % 0.3      Neutrophils Absolute 5.58      Immature Granulocytes Absolute, Automated 0.04      Lymphocytes Absolute 2.45      Monocytes Absolute 0.73      Eosinophils Absolute 0.32      Basophils Absolute 0.03     LACTATE - Abnormal    Lactate 6.0 (*)     Narrative:     Venipuncture immediately after or during the administration of Metamizole may lead to falsely low results. Testing should be performed immediately  prior to Metamizole dosing.   PROTIME-INR - Abnormal    Protime 14.7 (*)     INR 1.3 (*)    LACTATE - Abnormal    Lactate 7.7 (*)     Narrative:     Venipuncture immediately after or during the administration of Metamizole may lead to falsely low results. Testing should be performed immediately  prior to Metamizole dosing.   LIPASE - Normal    Lipase 24      Narrative:     Venipuncture immediately after or during the administration of Metamizole may lead to falsely low results. Testing should be performed immediately prior to Metamizole dosing.   BLOOD CULTURE   BLOOD CULTURE   EXTRA URINE GRAY TUBE    Extra Tube Hold for add-ons.     TYPE AND SCREEN    ABO TYPE B      Rh TYPE POS      ANTIBODY SCREEN NEG     URINALYSIS WITH REFLEX MICROSCOPIC AND CULTURE    Narrative:     The following orders were created for  panel order Urinalysis with Reflex Microscopic and Culture.  Procedure                               Abnormality         Status                     ---------                               -----------         ------                     Urinalysis with Reflex M...[[id number]]                      In process                 Extra Urine Gray Tube[[id number]]                            Final result                 Please view results for these tests on the individual orders.   URINALYSIS WITH REFLEX MICROSCOPIC AND CULTURE   LACTATE        CT abdomen pelvis wo IV contrast   Final Result   Large irregular heterogeneous retroperitoneal hematoma of the right   mid abdomen, the exact source of which is not clearly determined by   this exam. The descending and transverse duodenal segments appear to   be thickened and are inseparable from this collection. Additional   irregular fat stranding and small volume irregular fluid surrounds   this main collection throughout the retroperitoneum. CTA might be   considered for further evaluation.        Fat stranding adjacent to the proximal and mid pancreas could be   related to tracking fluid/edema. Correlate with lipase to better   exclude pancreatitis.        Several small nonobstructing renal calculi. No ureteral calculi   bilaterally. No hydroureteronephrosis bilaterally.        Fatty infiltration of the liver with small irregular regions of   relative sparing adjacent to the gallbladder.        Small volume intrapelvic free fluid. No free intraperitoneal air.        Colonic diverticulosis without acute diverticulitis.        Normal appendix.        MACRO:   Sukhdev Cote discussed the significance and urgency of this   critical finding by telephone with  [address] on 12/4/2023   at 1:18 pm.  (**-RCF-**) Findings:  See findings.        Signed by: Sukhdev Cote 12/4/2023 1:21 PM   Dictation workstation:   [id number]      CT angio abdomen pelvis w and or wo IV IV  contrast    (Results Pending)        Medical Decision Making         ED COURSE: This patient was seen and examined by myself and Dr. Dominique.  IV is established.  Labs are obtained and are noted above.  He is hydrated initially with normal saline bolus.  His lactate comes back elevated at 5.8, he is ordered more IV fluids and blood work, as well as antibiotics with concern for sepsis.  He was then sent over for CT with IV contrast, however he refused IV contrast and CT did it without.  Call from the radiologist showing a big retroperitoneal hematoma with an unknown source as there was no illumination from the CT CT dye.  Radiologist recommending a CTA.  At this point the patient does admit to possible abdominal trauma with multiple falls due to his history of TBI.  With concern for traumatic retroperitoneal home hematoma the patient will need to be transferred to a trauma center.  The patient and his wife are requesting [facility].  I spoke with Fela SIBLEY with the trauma team at [facility] he is accepted under Dr. Queen to go to the ER for further evaluation.  CTA abdomen pelvis added once we get an E[facility] of 1500 [facility].  Patient was over in CT however his IV was no good and he did not get this testing. Lactic acidosis worsening at 7.7. He started to have an episode of diaphoresis and tachycardia that was worsening.  Patient was then consented for blood transfusion and given 1 unit of O- blood prior to transport.  [facility] left with [facility] at 1600.             Differential Diagnoses Considered: Colitis, gastroenteritis, hepatitis, pancreatitis, appendicitis, cholecystitis, diverticulitis, hernia    Chronic Medical Conditions Significantly Affecting Care: see above    External Records Reviewed: I reviewed recent and relevant outside records including: PCP notes, prior discharge summary, previous radiologic studies    Diagnostic testing considered: blood, urine, ct  abd/pelv    Escalation of Care: Appropriate for inpatient management/trauma team evaluation    Discussion of Management with Other Providers: I discussed the patient/results with: trauma team [facility]        DIAGNOSTIC IMPRESSION: #1 abd pain, retroperitoneal hematoma #2 lactic acidosis     MARVIN Nunez-MARTÍNEZ  12/04/23 1332       MARVIN Nunez-CNP  12/04/23 8112

## 2023-12-05 ENCOUNTER — APPOINTMENT (OUTPATIENT)
Dept: PRIMARY CARE | Facility: CLINIC | Age: 63
End: 2023-12-05

## 2023-12-08 LAB
BACTERIA BLD CULT: NORMAL
BACTERIA BLD CULT: NORMAL
BLOOD EXPIRATION DATE: NORMAL
DISPENSE STATUS: NORMAL
PRODUCT BLOOD TYPE: 9500
PRODUCT CODE: NORMAL
UNIT ABO: NORMAL
UNIT NUMBER: NORMAL
UNIT RH: NORMAL
UNIT VOLUME: 350
XM INTEP: NORMAL

## 2023-12-12 PROBLEM — S36.892A TRAUMATIC RETROPERITONEAL HEMATOMA: Status: ACTIVE | Noted: 2023-12-04

## 2023-12-13 ENCOUNTER — PATIENT OUTREACH (OUTPATIENT)
Dept: PRIMARY CARE | Facility: CLINIC | Age: 63
End: 2023-12-13

## 2023-12-13 NOTE — PROGRESS NOTES
Discharge Facility: Mobile  Discharge Diagnosis: fall  Admission Date: 12/4/2023  Discharge Date:  12/12/2023    PCP Appointment Date: none  Specialist Appointment Date: none  Hospital Encounter and Summary: Linked   See discharge assessment below for further details    Engagement  Call Start Time: 0914 (12/13/2023  9:14 AM)    Medications  Medications reviewed with patient/caregiver?: Yes (12/13/2023  9:14 AM)  Is the patient having any side effects they believe may be caused by any medication additions or changes?: No (12/13/2023  9:14 AM)  Does the patient have all medications ordered at discharge?: Yes (12/13/2023  9:14 AM)  Care Management Interventions: No intervention needed (12/13/2023  9:14 AM)  Prescription Comments: change: toprol xl (12/13/2023  9:14 AM)  Is the patient taking all medications as directed (includes completed medication regime)?: Yes (12/13/2023  9:14 AM)  Medication Comments: see med list (12/13/2023  9:14 AM)    Appointments  Does the patient have a primary care provider?: Yes (12/13/2023  9:14 AM)  Care Management Interventions: Advised patient to make appointment (12/13/2023  9:14 AM)  Has the patient kept scheduled appointments due by today?: Yes (12/13/2023  9:14 AM)  Care Management Interventions: Advised patient to keep appointment (12/13/2023  9:14 AM)    Self Management  What is the home health agency?: none (12/13/2023  9:14 AM)  Has home health visited the patient within 72 hours of discharge?: Not applicable (12/13/2023  9:14 AM)    Patient Teaching  Does the patient have access to their discharge instructions?: Yes (12/13/2023  9:14 AM)  Care Management Interventions: Reviewed instructions with patient (12/13/2023  9:14 AM)  What is the patient's perception of their health status since discharge?: Same (12/13/2023  9:14 AM)  Is the patient/caregiver able to teach back the hierarchy of who to call/visit for symptoms/problems? PCP, Specialist, Home Health nurse, Urgent Care,  ED, 911: Yes (12/13/2023  9:14 AM)    Wrap Up  Wrap Up Additional Comments: CTS spoke with patient. He stated that he was having some belly pain. reports a little nausea however no vomiting. patient did have a bowel movement since getting home. Patient is aware of the medication changes and denies any questions regarding this. Patient does not have an appt with PCP for follow up, patient wants to self schedule. Will task office staff. Patient voiced no concerns at this time. (12/13/2023  9:14 AM)  Call End Time: 0919 (12/13/2023  9:14 AM)

## 2023-12-14 ENCOUNTER — TELEPHONE (OUTPATIENT)
Dept: PRIMARY CARE | Facility: CLINIC | Age: 63
End: 2023-12-14

## 2023-12-14 NOTE — TELEPHONE ENCOUNTER
Patient called and was asking about a Bed order/ said he's having a hard time going up stairs.   He has an apt tomorrow but wasn't sure if we can get started on now.

## 2023-12-15 ENCOUNTER — LAB (OUTPATIENT)
Dept: LAB | Facility: LAB | Age: 63
End: 2023-12-15
Payer: COMMERCIAL

## 2023-12-15 ENCOUNTER — OFFICE VISIT (OUTPATIENT)
Dept: PRIMARY CARE | Facility: CLINIC | Age: 63
End: 2023-12-15
Payer: COMMERCIAL

## 2023-12-15 VITALS
HEART RATE: 110 BPM | DIASTOLIC BLOOD PRESSURE: 70 MMHG | OXYGEN SATURATION: 95 % | BODY MASS INDEX: 30.67 KG/M2 | WEIGHT: 190 LBS | SYSTOLIC BLOOD PRESSURE: 116 MMHG

## 2023-12-15 DIAGNOSIS — R82.2 BILIRUBINURIA: ICD-10-CM

## 2023-12-15 DIAGNOSIS — M47.12 CERVICAL ARTHRITIS WITH MYELOPATHY: ICD-10-CM

## 2023-12-15 DIAGNOSIS — M16.0 PRIMARY OSTEOARTHRITIS OF BOTH HIPS: ICD-10-CM

## 2023-12-15 DIAGNOSIS — S36.892D TRAUMATIC RETROPERITONEAL HEMATOMA, SUBSEQUENT ENCOUNTER: ICD-10-CM

## 2023-12-15 DIAGNOSIS — S09.90XS BALANCE DISTURBANCE DUE TO OLD HEAD INJURY: ICD-10-CM

## 2023-12-15 DIAGNOSIS — R26.89 BALANCE DISTURBANCE DUE TO OLD HEAD INJURY: ICD-10-CM

## 2023-12-15 DIAGNOSIS — S36.892D TRAUMATIC RETROPERITONEAL HEMATOMA, SUBSEQUENT ENCOUNTER: Primary | ICD-10-CM

## 2023-12-15 DIAGNOSIS — M75.41 IMPINGEMENT SYNDROME OF RIGHT SHOULDER: ICD-10-CM

## 2023-12-15 DIAGNOSIS — M75.42 IMPINGEMENT SYNDROME OF LEFT SHOULDER: ICD-10-CM

## 2023-12-15 DIAGNOSIS — R82.2 BILIRUBINURIA: Primary | ICD-10-CM

## 2023-12-15 DIAGNOSIS — Z09 HOSPITAL DISCHARGE FOLLOW-UP: ICD-10-CM

## 2023-12-15 PROBLEM — K68.3 NONTRAUMATIC RETROPERITONEAL HEMATOMA: Status: RESOLVED | Noted: 2023-12-15 | Resolved: 2023-12-15

## 2023-12-15 PROBLEM — K68.3 NONTRAUMATIC RETROPERITONEAL HEMATOMA: Status: ACTIVE | Noted: 2023-12-15

## 2023-12-15 LAB
ALBUMIN SERPL BCP-MCNC: 4.3 G/DL (ref 3.4–5)
ALP SERPL-CCNC: 60 U/L (ref 33–136)
ALT SERPL W P-5'-P-CCNC: 15 U/L (ref 10–52)
AMORPH CRY #/AREA UR COMP ASSIST: ABNORMAL /HPF
ANION GAP SERPL CALC-SCNC: 13 MMOL/L (ref 10–20)
APPEARANCE UR: ABNORMAL
AST SERPL W P-5'-P-CCNC: 25 U/L (ref 9–39)
BACTERIA #/AREA URNS AUTO: ABNORMAL /HPF
BILIRUB DIRECT SERPL-MCNC: 0.3 MG/DL (ref 0–0.3)
BILIRUB SERPL-MCNC: 1.1 MG/DL (ref 0–1.2)
BILIRUB UR STRIP.AUTO-MCNC: NEGATIVE MG/DL
BUN SERPL-MCNC: 13 MG/DL (ref 6–23)
CALCIUM SERPL-MCNC: 9.5 MG/DL (ref 8.6–10.3)
CHLORIDE SERPL-SCNC: 106 MMOL/L (ref 98–107)
CO2 SERPL-SCNC: 24 MMOL/L (ref 21–32)
COLOR UR: YELLOW
CREAT SERPL-MCNC: 0.66 MG/DL (ref 0.5–1.3)
ERYTHROCYTE [DISTWIDTH] IN BLOOD BY AUTOMATED COUNT: 16.5 % (ref 11.5–14.5)
GFR SERPL CREATININE-BSD FRML MDRD: >90 ML/MIN/1.73M*2
GLUCOSE SERPL-MCNC: 142 MG/DL (ref 74–99)
GLUCOSE UR STRIP.AUTO-MCNC: NEGATIVE MG/DL
HCT VFR BLD AUTO: 37.7 % (ref 41–52)
HGB BLD-MCNC: 11.8 G/DL (ref 13.5–17.5)
KETONES UR STRIP.AUTO-MCNC: ABNORMAL MG/DL
LEUKOCYTE ESTERASE UR QL STRIP.AUTO: ABNORMAL
MCH RBC QN AUTO: 29.1 PG (ref 26–34)
MCHC RBC AUTO-ENTMCNC: 31.3 G/DL (ref 32–36)
MCV RBC AUTO: 93 FL (ref 80–100)
NITRITE UR QL STRIP.AUTO: NEGATIVE
NRBC BLD-RTO: 0 /100 WBCS (ref 0–0)
PH UR STRIP.AUTO: 5 [PH]
PLATELET # BLD AUTO: 380 X10*3/UL (ref 150–450)
POTASSIUM SERPL-SCNC: 3.7 MMOL/L (ref 3.5–5.3)
PROT SERPL-MCNC: 7 G/DL (ref 6.4–8.2)
PROT UR STRIP.AUTO-MCNC: ABNORMAL MG/DL
RBC # BLD AUTO: 4.05 X10*6/UL (ref 4.5–5.9)
RBC # UR STRIP.AUTO: NEGATIVE /UL
RBC #/AREA URNS AUTO: ABNORMAL /HPF
SODIUM SERPL-SCNC: 139 MMOL/L (ref 136–145)
SP GR UR STRIP.AUTO: 1.03
UROBILINOGEN UR STRIP.AUTO-MCNC: 2 MG/DL
WBC # BLD AUTO: 5.9 X10*3/UL (ref 4.4–11.3)
WBC #/AREA URNS AUTO: ABNORMAL /HPF

## 2023-12-15 PROCEDURE — 81001 URINALYSIS AUTO W/SCOPE: CPT

## 2023-12-15 PROCEDURE — 3074F SYST BP LT 130 MM HG: CPT | Performed by: FAMILY MEDICINE

## 2023-12-15 PROCEDURE — 99495 TRANSJ CARE MGMT MOD F2F 14D: CPT | Performed by: FAMILY MEDICINE

## 2023-12-15 PROCEDURE — 85027 COMPLETE CBC AUTOMATED: CPT

## 2023-12-15 PROCEDURE — 3008F BODY MASS INDEX DOCD: CPT | Performed by: FAMILY MEDICINE

## 2023-12-15 PROCEDURE — 3078F DIAST BP <80 MM HG: CPT | Performed by: FAMILY MEDICINE

## 2023-12-15 PROCEDURE — 1036F TOBACCO NON-USER: CPT | Performed by: FAMILY MEDICINE

## 2023-12-15 PROCEDURE — 82248 BILIRUBIN DIRECT: CPT

## 2023-12-15 PROCEDURE — 3051F HG A1C>EQUAL 7.0%<8.0%: CPT | Performed by: FAMILY MEDICINE

## 2023-12-15 PROCEDURE — 36415 COLL VENOUS BLD VENIPUNCTURE: CPT

## 2023-12-15 PROCEDURE — 80053 COMPREHEN METABOLIC PANEL: CPT

## 2023-12-15 RX ORDER — HYDROCODONE BITARTRATE AND ACETAMINOPHEN 10; 325 MG/1; MG/1
1 TABLET ORAL 3 TIMES DAILY PRN
Qty: 90 TABLET | Refills: 0 | Status: SHIPPED | OUTPATIENT
Start: 2023-12-22 | End: 2024-01-16 | Stop reason: SDUPTHER

## 2023-12-15 NOTE — PROGRESS NOTES
Subjective   Patient ID: Kenneth Valenzuela is a 63 y.o. male who presents for Follow-up (Hospital follow up internal bleeding).    HPI Hospital follow up from 12/4-12/23 at [facility].  Had a retroperitoneal hematoma. Felt to be due to one of his many falls. Observed and resolving now. Pain in lower abdomen. That is gone. Had bruising around scrotum that is better.  He was to get Rehab but wants to wait till after Medicare next month. Feels he needs to strengthen his legs and arm.  No change in medications that he is aware of   Did have blood transfusions. Last hemoglobin on 12/8 9.5 but we do not have the 12/12/ labs. Will follow up today.   Would like a hospital bed.  That would be in the living room. With his multiple issues with joints he can use the different positions. He is using a rollator at home.   Diabetes has been good on no meds. A1C 7.6 in June   Wife noting dark urine so will check that today as well     Review of Systems    Objective   /70 (BP Location: Left arm, Patient Position: Sitting)   Pulse 110   Wt 86.2 kg (190 lb)   SpO2 95%   BMI 30.67 kg/m²     Physical Exam  Vitals reviewed.   Constitutional:       Appearance: Normal appearance.      Comments: Sitting in wheelchair. Ataxia as usual.. Right ankle in brace    HENT:      Head: Normocephalic.   Eyes:      Extraocular Movements: Extraocular movements intact.      Conjunctiva/sclera: Conjunctivae normal.      Pupils: Pupils are equal, round, and reactive to light.   Neck:      Vascular: No carotid bruit.   Cardiovascular:      Rate and Rhythm: Normal rate and regular rhythm.      Heart sounds: No murmur heard.  Pulmonary:      Effort: Pulmonary effort is normal.      Breath sounds: Normal breath sounds.   Abdominal:      General: Abdomen is flat. Bowel sounds are normal. There is no distension.      Palpations: Abdomen is soft. There is no mass.      Tenderness: There is no abdominal tenderness.   Genitourinary:     Comments: Scrotum  with ecchymosis but not a lot of swelling. No other ecchymosis   Musculoskeletal:      Cervical back: Normal range of motion and neck supple. No tenderness.   Lymphadenopathy:      Cervical: No cervical adenopathy.   Skin:     General: Skin is warm and dry.   Neurological:      Mental Status: He is alert and oriented to person, place, and time.   Psychiatric:         Mood and Affect: Mood normal.         Behavior: Behavior normal.         Thought Content: Thought content normal.         Judgment: Judgment normal.         Assessment/Plan   Diagnoses and all orders for this visit:  Traumatic retroperitoneal hematoma, subsequent encounter  -     CBC; Future  -     Basic Metabolic Panel; Future  -     Hospital Bed  -     Follow Up In Primary Care - Established; Future  -     Urinalysis with Reflex Microscopic; Future  Cervical arthritis with myelopathy  -     HYDROcodone-acetaminophen (Norco)  mg tablet; Take 1 tablet by mouth 3 times a day as needed for severe pain (7 - 10). Do not start before December 22, 2023.  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

## 2023-12-20 ENCOUNTER — TELEPHONE (OUTPATIENT)
Dept: PRIMARY CARE | Facility: CLINIC | Age: 63
End: 2023-12-20

## 2023-12-20 DIAGNOSIS — M47.12 CERVICAL ARTHRITIS WITH MYELOPATHY: ICD-10-CM

## 2023-12-27 ENCOUNTER — PATIENT OUTREACH (OUTPATIENT)
Dept: PRIMARY CARE | Facility: CLINIC | Age: 63
End: 2023-12-27

## 2023-12-27 NOTE — PROGRESS NOTES
Call regarding appt. with PCP on 12/15/2023 after hospitalization.  At time of outreach call the patient feels as if their condition has improved since last visit.  Reviewed the PCP appointment with the pt and addressed any questions or concerns.

## 2024-01-16 ENCOUNTER — TELEPHONE (OUTPATIENT)
Dept: PRIMARY CARE | Facility: CLINIC | Age: 64
End: 2024-01-16
Payer: COMMERCIAL

## 2024-01-16 DIAGNOSIS — M47.12 CERVICAL ARTHRITIS WITH MYELOPATHY: ICD-10-CM

## 2024-01-16 RX ORDER — HYDROCODONE BITARTRATE AND ACETAMINOPHEN 10; 325 MG/1; MG/1
1 TABLET ORAL 3 TIMES DAILY PRN
Qty: 90 TABLET | Refills: 0 | Status: SHIPPED | OUTPATIENT
Start: 2024-01-16 | End: 2024-01-19 | Stop reason: SDUPTHER

## 2024-01-19 ENCOUNTER — TELEPHONE (OUTPATIENT)
Dept: PRIMARY CARE | Facility: CLINIC | Age: 64
End: 2024-01-19
Payer: COMMERCIAL

## 2024-01-19 DIAGNOSIS — M47.12 CERVICAL ARTHRITIS WITH MYELOPATHY: ICD-10-CM

## 2024-01-19 RX ORDER — HYDROCODONE BITARTRATE AND ACETAMINOPHEN 10; 325 MG/1; MG/1
1 TABLET ORAL 3 TIMES DAILY PRN
Qty: 90 TABLET | Refills: 0 | Status: SHIPPED | OUTPATIENT
Start: 2024-01-19 | End: 2024-02-15 | Stop reason: SDUPTHER

## 2024-01-26 ENCOUNTER — PATIENT OUTREACH (OUTPATIENT)
Dept: PRIMARY CARE | Facility: CLINIC | Age: 64
End: 2024-01-26
Payer: COMMERCIAL

## 2024-02-06 ENCOUNTER — OFFICE VISIT (OUTPATIENT)
Dept: PRIMARY CARE | Facility: CLINIC | Age: 64
End: 2024-02-06
Payer: COMMERCIAL

## 2024-02-06 VITALS
WEIGHT: 189 LBS | DIASTOLIC BLOOD PRESSURE: 76 MMHG | HEART RATE: 129 BPM | BODY MASS INDEX: 30.51 KG/M2 | OXYGEN SATURATION: 94 % | SYSTOLIC BLOOD PRESSURE: 122 MMHG

## 2024-02-06 DIAGNOSIS — E87.6 HYPOKALEMIA: ICD-10-CM

## 2024-02-06 DIAGNOSIS — M75.42 IMPINGEMENT SYNDROME OF LEFT SHOULDER: ICD-10-CM

## 2024-02-06 DIAGNOSIS — E11.9 CONTROLLED TYPE 2 DIABETES MELLITUS WITHOUT COMPLICATION, WITHOUT LONG-TERM CURRENT USE OF INSULIN (MULTI): ICD-10-CM

## 2024-02-06 DIAGNOSIS — I10 PRIMARY HYPERTENSION: ICD-10-CM

## 2024-02-06 DIAGNOSIS — N40.1 BENIGN PROSTATIC HYPERPLASIA WITH NOCTURIA: ICD-10-CM

## 2024-02-06 DIAGNOSIS — E78.2 MIXED HYPERLIPIDEMIA: ICD-10-CM

## 2024-02-06 DIAGNOSIS — G89.4 CHRONIC PAIN SYNDROME: ICD-10-CM

## 2024-02-06 DIAGNOSIS — M16.0 PRIMARY OSTEOARTHRITIS OF BOTH HIPS: ICD-10-CM

## 2024-02-06 DIAGNOSIS — M70.62 TROCHANTERIC BURSITIS OF LEFT HIP: ICD-10-CM

## 2024-02-06 DIAGNOSIS — Z79.899 MEDICATION MANAGEMENT: Primary | ICD-10-CM

## 2024-02-06 DIAGNOSIS — R45.4 BEHAVIOR-IRRITABILITY: ICD-10-CM

## 2024-02-06 DIAGNOSIS — E11.42 DIABETIC POLYNEUROPATHY ASSOCIATED WITH TYPE 2 DIABETES MELLITUS (MULTI): ICD-10-CM

## 2024-02-06 DIAGNOSIS — R00.0 TACHYCARDIA: ICD-10-CM

## 2024-02-06 DIAGNOSIS — S06.9X5A: ICD-10-CM

## 2024-02-06 DIAGNOSIS — R35.1 BENIGN PROSTATIC HYPERPLASIA WITH NOCTURIA: ICD-10-CM

## 2024-02-06 DIAGNOSIS — R29.6 FALLS FREQUENTLY: ICD-10-CM

## 2024-02-06 DIAGNOSIS — S06.9X0S DIFFICULTY CONTROLLING BEHAVIOR AS LATE EFFECT TRAUMATIC BRAIN INJURY (CMS-HCC): ICD-10-CM

## 2024-02-06 DIAGNOSIS — J20.9 ACUTE BRONCHITIS, UNSPECIFIED ORGANISM: ICD-10-CM

## 2024-02-06 DIAGNOSIS — R46.89 DIFFICULTY CONTROLLING BEHAVIOR AS LATE EFFECT TRAUMATIC BRAIN INJURY (CMS-HCC): ICD-10-CM

## 2024-02-06 DIAGNOSIS — R56.9 SEIZURE (MULTI): ICD-10-CM

## 2024-02-06 DIAGNOSIS — E66.09 CLASS 1 OBESITY DUE TO EXCESS CALORIES WITH SERIOUS COMORBIDITY AND BODY MASS INDEX (BMI) OF 30.0 TO 30.9 IN ADULT: ICD-10-CM

## 2024-02-06 DIAGNOSIS — F32.1 DEPRESSION, MAJOR, SINGLE EPISODE, MODERATE (MULTI): ICD-10-CM

## 2024-02-06 DIAGNOSIS — J30.9 ALLERGIC RHINITIS, UNSPECIFIED SEASONALITY, UNSPECIFIED TRIGGER: ICD-10-CM

## 2024-02-06 PROCEDURE — 80358 DRUG SCREENING METHADONE: CPT

## 2024-02-06 PROCEDURE — 80368 SEDATIVE HYPNOTICS: CPT

## 2024-02-06 PROCEDURE — 80365 DRUG SCREENING OXYCODONE: CPT

## 2024-02-06 PROCEDURE — 1157F ADVNC CARE PLAN IN RCRD: CPT | Performed by: FAMILY MEDICINE

## 2024-02-06 PROCEDURE — 3008F BODY MASS INDEX DOCD: CPT | Performed by: FAMILY MEDICINE

## 2024-02-06 PROCEDURE — 1036F TOBACCO NON-USER: CPT | Performed by: FAMILY MEDICINE

## 2024-02-06 PROCEDURE — 80373 DRUG SCREENING TRAMADOL: CPT

## 2024-02-06 PROCEDURE — 99215 OFFICE O/P EST HI 40 MIN: CPT | Performed by: FAMILY MEDICINE

## 2024-02-06 PROCEDURE — 1125F AMNT PAIN NOTED PAIN PRSNT: CPT | Performed by: FAMILY MEDICINE

## 2024-02-06 PROCEDURE — 3078F DIAST BP <80 MM HG: CPT | Performed by: FAMILY MEDICINE

## 2024-02-06 PROCEDURE — 80346 BENZODIAZEPINES1-12: CPT

## 2024-02-06 PROCEDURE — 80354 DRUG SCREENING FENTANYL: CPT

## 2024-02-06 PROCEDURE — 20610 DRAIN/INJ JOINT/BURSA W/O US: CPT | Performed by: FAMILY MEDICINE

## 2024-02-06 PROCEDURE — 3074F SYST BP LT 130 MM HG: CPT | Performed by: FAMILY MEDICINE

## 2024-02-06 PROCEDURE — 80307 DRUG TEST PRSMV CHEM ANLYZR: CPT

## 2024-02-06 PROCEDURE — 4010F ACE/ARB THERAPY RXD/TAKEN: CPT | Performed by: FAMILY MEDICINE

## 2024-02-06 PROCEDURE — 82570 ASSAY OF URINE CREATININE: CPT

## 2024-02-06 PROCEDURE — 80361 OPIATES 1 OR MORE: CPT

## 2024-02-06 PROCEDURE — 1159F MED LIST DOCD IN RCRD: CPT | Performed by: FAMILY MEDICINE

## 2024-02-06 PROCEDURE — 1160F RVW MEDS BY RX/DR IN RCRD: CPT | Performed by: FAMILY MEDICINE

## 2024-02-06 RX ORDER — FENOFIBRIC ACID 135 MG/1
135 CAPSULE, DELAYED RELEASE ORAL DAILY
Qty: 30 CAPSULE | Refills: 11 | Status: SHIPPED | OUTPATIENT
Start: 2024-02-06 | End: 2025-02-05

## 2024-02-06 RX ORDER — ALBUTEROL SULFATE 90 UG/1
2 AEROSOL, METERED RESPIRATORY (INHALATION) EVERY 6 HOURS PRN
Qty: 8 G | Refills: 3 | Status: SHIPPED | OUTPATIENT
Start: 2024-02-06 | End: 2024-04-06

## 2024-02-06 RX ORDER — POTASSIUM CHLORIDE 750 MG/1
10 CAPSULE, EXTENDED RELEASE ORAL DAILY
Qty: 30 CAPSULE | Refills: 11 | Status: SHIPPED | OUTPATIENT
Start: 2024-02-06 | End: 2025-02-05

## 2024-02-06 RX ORDER — LEVETIRACETAM 500 MG/1
500 TABLET ORAL 2 TIMES DAILY
Qty: 60 TABLET | Refills: 11 | Status: SHIPPED | OUTPATIENT
Start: 2024-02-06 | End: 2025-02-05

## 2024-02-06 RX ORDER — DULOXETIN HYDROCHLORIDE 30 MG/1
30 CAPSULE, DELAYED RELEASE ORAL
Qty: 30 CAPSULE | Refills: 11 | Status: SHIPPED | OUTPATIENT
Start: 2024-02-06 | End: 2025-02-05

## 2024-02-06 RX ORDER — IRBESARTAN 75 MG/1
75 TABLET ORAL DAILY
Qty: 30 TABLET | Refills: 11 | Status: SHIPPED | OUTPATIENT
Start: 2024-02-06 | End: 2025-02-05

## 2024-02-06 RX ORDER — QUETIAPINE FUMARATE 25 MG/1
50 TABLET, FILM COATED ORAL NIGHTLY
Qty: 60 TABLET | Refills: 11 | Status: SHIPPED | OUTPATIENT
Start: 2024-02-06 | End: 2024-02-06 | Stop reason: SDUPTHER

## 2024-02-06 RX ORDER — DULOXETIN HYDROCHLORIDE 60 MG/1
60 CAPSULE, DELAYED RELEASE ORAL DAILY
Qty: 30 CAPSULE | Refills: 11 | Status: SHIPPED | OUTPATIENT
Start: 2024-02-06 | End: 2025-02-05

## 2024-02-06 RX ORDER — IRBESARTAN 75 MG/1
75 TABLET ORAL DAILY
COMMUNITY
Start: 2024-01-18 | End: 2024-02-06 | Stop reason: SDUPTHER

## 2024-02-06 RX ORDER — TRIAMCINOLONE ACETONIDE 40 MG/ML
2.5 INJECTION, SUSPENSION INTRA-ARTICULAR; INTRAMUSCULAR
Status: COMPLETED | OUTPATIENT
Start: 2024-02-06 | End: 2024-02-06

## 2024-02-06 RX ORDER — METOPROLOL SUCCINATE 25 MG/1
25 TABLET, EXTENDED RELEASE ORAL DAILY
Qty: 30 TABLET | Refills: 11 | Status: SHIPPED | OUTPATIENT
Start: 2024-02-06 | End: 2025-02-05

## 2024-02-06 RX ORDER — FLUTICASONE PROPIONATE 50 MCG
1 SPRAY, SUSPENSION (ML) NASAL DAILY
Qty: 16 G | Refills: 11 | Status: SHIPPED | OUTPATIENT
Start: 2024-02-06

## 2024-02-06 RX ORDER — QUETIAPINE FUMARATE 25 MG/1
25 TABLET, FILM COATED ORAL 3 TIMES DAILY
Qty: 90 TABLET | Refills: 11 | Status: SHIPPED | OUTPATIENT
Start: 2024-02-06 | End: 2025-02-05

## 2024-02-06 RX ORDER — MELOXICAM 15 MG/1
15 TABLET ORAL DAILY
Qty: 30 TABLET | Refills: 11 | Status: SHIPPED | OUTPATIENT
Start: 2024-02-06 | End: 2025-02-05

## 2024-02-06 RX ADMIN — TRIAMCINOLONE ACETONIDE 2.5 MG: 40 INJECTION, SUSPENSION INTRA-ARTICULAR; INTRAMUSCULAR at 12:40

## 2024-02-06 NOTE — PROGRESS NOTES
Subjective   Patient ID: Kenneth Valenzuela is a 64 y.o. male who presents for Med Management (Address behavioral problems).    HPI   Allergic rhinitis - has been on INS and helps. A lot of mucus in AM from nose. No longer on supplement Balance of Nature.. But on vitamins.       BPH - had a Urolift procedure by Dr Waddell on September 3, 2020. Did help for awhile but no longer. Able to retain fluid with nocturia X 2-3. No burning. Flow was good and larger amounts but now diminishing.  No medications.      Cervical spondylosis with myelopathy and radiculopathy - hard to tell if myelopathy causing his imbalance or if due to TBI. Using Norco pretty regularly as pain seems to be more daily intolerable.  Pain levels 1 to 9 Med drops it by 5 for 2 hours.  The left hip is quite painful also and Dr Alejandra injected last August with good relief. Now back as it lasted for a few months. Has had injection from Rosy Leung in the left shoulder that helped for awhile.  Did have RSR on right, To have Left at some point.  With med for a couple hours is able to rest and sleep. He is on 3 in a day of the [place] 5.   Home traction unit helps the back. Mobic helps some as he noted a lot worse when off of it.  Would like to do more in yard but balance and pain limit. Does mow lawn on riding mower.       Closed traumatic brain injury - falls as balance is getting worse since he had the seizures in 2018. Aricept for the memory did not help. Has tried CBD gummies and says they help balance. But aware he cannot get Norco and stay on those. Dr Perez did surgery on the brain for SDH.  He is having more spasticity. Dropping silverware. Pain in hands with writing.  Dr Mahcado did EMG showing CTS.   Was to see Pain management in [place] but has not been there. Does not feel PT helps.      Depression and anxiety - He is on Seroquel 25 at 2 qhs.. Dries out mouth but not as bad as Risperdal. Keeps working on not raising his voice and Cymbalta 30 and  60. Dr Smith managed TBI but no longer. Seems not as good with the pain up. And not happy with current medication efficacy.  Having bizarre thoughts.      Hyperlipidemia -  On Trilipix and still has high TG at 233 in December 2022but down from upper 300s before that.      Hypertension - meds work well without side effects. Weight is down. Apple Cider vinegar helps . No Chest pain, Dyspnea, palpitations, numbness, weakness, edema, claudications, or new double vision/ loss of vision.      Impaired fasting glucose - BS now DM at 139 and A1C of 7.6  last summer and has done well with diet.   Home glucose rarely checked.  Low to mid 100s. GFR over 90     Male erectile disorder - Viagra works well. Wife is not interested.      MIGRAINE - better on current meds. Sumatriptan works well. Trileptal. Aura is a stiff neck. Traction helped this as well and not needing it now. Will use Icy Hot and does seem to help     Obesity - stable.  BMI 30.  Diet with lesser portions and more greens not as much now. Did lose      Osteoarthritis of both knees with TKR Left. The left knee will pop if he kneels. Limits kneeling. Feels more sloppy in the patella.      Primary osteoarthritis of both hips - was better on the left with injection of Kenalog in bursa.     RSR on right doing well. Still has pain.      Seizure - Last seizure in October 2022 with admission. Had gone off of seizure meds. So now back on Keppra. Doing well.  No driving per Dr Perez.  Had subdural from fall and that has stabilized.       Snores and talks in sleep but no gasping      I have personally reviewed the patients OARRS report. This report is filed in the EHR. I have considered the risks of abuse, addiction and diversion. I believe it is clinically appropriate to continue to prescribe this medication.      CSA 2/6/24  UDS 3/4/21 as expected for medication profile 3/7/22 as expected for medication profile 2/28/23 as expected for medication profile 2/6/24 as expected  for medication profile   PSA 12/1/22  Colonoscopy 11/18/15      Review of Systems    Objective   /76 (BP Location: Left arm, Patient Position: Sitting)   Pulse (!) 129   Wt 85.7 kg (189 lb)   SpO2 94%   BMI 30.51 kg/m²     Physical Exam  Vitals reviewed.   Constitutional:       Appearance: Normal appearance.      Comments: Very ataxic with walking   HENT:      Head: Normocephalic.   Eyes:      Extraocular Movements: Extraocular movements intact.      Conjunctiva/sclera: Conjunctivae normal.      Pupils: Pupils are equal, round, and reactive to light.   Neck:      Vascular: No carotid bruit.   Cardiovascular:      Rate and Rhythm: Normal rate and regular rhythm.      Heart sounds: No murmur heard.  Pulmonary:      Effort: Pulmonary effort is normal.      Breath sounds: Normal breath sounds.   Abdominal:      General: Abdomen is flat. Bowel sounds are normal.      Palpations: Abdomen is soft.   Musculoskeletal:         General: Tenderness (left greater trochanter.Low back not tender.) present.      Cervical back: Normal range of motion and neck supple. No tenderness.      Comments: Nickerson positive left shoulder With decreased ROM    Lymphadenopathy:      Cervical: No cervical adenopathy.   Skin:     General: Skin is warm and dry.   Neurological:      Mental Status: He is alert and oriented to person, place, and time.   Psychiatric:         Mood and Affect: Mood normal.         Thought Content: Thought content normal.         Judgment: Judgment normal.      Comments: Irritable today       Patient ID: Kenneth Valenzuela is a 64 y.o. male.    Joint Injection Large/Arthrocentesis: L greater trochanteric bursa on 2/6/2024 12:40 PM  Indications: pain  Details: 25 G needle, lateral approach  Medications: 2.5 mg triamcinolone acetonide 40 mg/mL  Outcome: tolerated well, no immediate complications  Procedure, treatment alternatives, risks and benefits explained, specific risks discussed. Patient was prepped and draped in  the usual sterile fashion (Prep with Chlorhexidine.  ).           Assessment/Plan   Diagnoses and all orders for this visit:  Medication management  -     Opiate/Opioid/Benzo Prescription Compliance  -     OOB Internal Tracking  Acute bronchitis, unspecified organism  -     albuterol (Ventolin HFA) 90 mcg/actuation inhaler; Inhale 2 puffs every 6 hours if needed for wheezing or shortness of breath.  -     CBC; Future  -     Comprehensive Metabolic Panel; Future  Tachycardia  -     metoprolol succinate XL (Toprol-XL) 25 mg 24 hr tablet; Take 1 tablet (25 mg) by mouth once daily.  Depression, major, single episode, moderate (CMS/HCC)  -     QUEtiapine (SEROquel) 25 mg tablet; Take 1 tablet (25 mg) by mouth 3 times a day.  Mixed hyperlipidemia  -     choline fenofibrate (Trilipix) 135 mg DR capsule; Take 1 capsule (135 mg) by mouth once daily.  -     Lipid Panel; Future  Behavior-irritability  -     DULoxetine (Cymbalta) 30 mg DR capsule; Take 1 capsule (30 mg) by mouth once daily.  -     DULoxetine (Cymbalta) 60 mg DR capsule; Take 1 capsule (60 mg) by mouth once daily.  -     Follow Up In Primary Care - Established; Future  Allergic rhinitis, unspecified seasonality, unspecified trigger  -     fluticasone (Flonase) 50 mcg/actuation nasal spray; Administer 1 spray into each nostril once daily. PRN  Primary hypertension  -     irbesartan (Avapro) 75 mg tablet; Take 1 tablet (75 mg) by mouth once daily.  Seizure (CMS/HCC)  -     levETIRAcetam (Keppra) 500 mg tablet; Take 1 tablet (500 mg) by mouth 2 times a day.  Primary osteoarthritis of both hips  -     meloxicam (Mobic) 15 mg tablet; Take 1 tablet (15 mg) by mouth once daily.  Hypokalemia  -     potassium chloride ER (Micro-K) 10 mEq ER capsule; Take 1 capsule (10 mEq) by mouth once daily.  Benign prostatic hyperplasia with nocturia  -     Prostate Specific Antigen; Future  Controlled type 2 diabetes mellitus without complication, without long-term current use of  insulin (CMS/[facility])  -     Hemoglobin A1C; Future  Chronic pain syndrome  Class 1 obesity due to excess calories with serious comorbidity and body mass index (BMI) of 30.0 to 30.9 in adult  Diabetic polyneuropathy associated with type 2 diabetes mellitus (CMS/[facility])  Difficulty controlling behavior as late effect traumatic brain injury (CMS/[facility])  Falls frequently  Impingement syndrome of left shoulder  Unspecified intracranial injury with loss of consciousness greater than 24 hours with return to pre-existing conscious level, initial encounter (CMS/[facility])  Trochanteric bursitis of left hip [M70.62]  Other orders  -     Joint Injection Large/Arthrocentesis

## 2024-02-07 LAB
AMPHETAMINES UR QL SCN: NORMAL
BARBITURATES UR QL SCN: NORMAL
BZE UR QL SCN: NORMAL
CANNABINOIDS UR QL SCN: NORMAL
CREAT UR-MCNC: 337 MG/DL (ref 20–370)
PCP UR QL SCN: NORMAL

## 2024-02-12 LAB
1OH-MIDAZOLAM UR CFM-MCNC: <25 NG/ML
6MAM UR CFM-MCNC: <25 NG/ML
7AMINOCLONAZEPAM UR CFM-MCNC: <25 NG/ML
A-OH ALPRAZ UR CFM-MCNC: <25 NG/ML
ALPRAZ UR CFM-MCNC: <25 NG/ML
CHLORDIAZEP UR CFM-MCNC: <25 NG/ML
CLONAZEPAM UR CFM-MCNC: <25 NG/ML
CODEINE UR CFM-MCNC: <50 NG/ML
DIAZEPAM UR CFM-MCNC: <25 NG/ML
EDDP UR CFM-MCNC: <25 NG/ML
FENTANYL UR CFM-MCNC: <2.5 NG/ML
HYDROCODONE CTO UR CFM-MCNC: >2500 NG/ML
HYDROMORPHONE UR CFM-MCNC: 1785 NG/ML
LORAZEPAM UR CFM-MCNC: <25 NG/ML
METHADONE UR CFM-MCNC: <25 NG/ML
MIDAZOLAM UR CFM-MCNC: <25 NG/ML
MORPHINE UR CFM-MCNC: <50 NG/ML
NORDIAZEPAM UR CFM-MCNC: <25 NG/ML
NORFENTANYL UR CFM-MCNC: <2.5 NG/ML
NORHYDROCODONE UR CFM-MCNC: >1000 NG/ML
NOROXYCODONE UR CFM-MCNC: <25 NG/ML
NORTRAMADOL UR-MCNC: <50 NG/ML
OXAZEPAM UR CFM-MCNC: <25 NG/ML
OXYCODONE UR CFM-MCNC: <25 NG/ML
OXYMORPHONE UR CFM-MCNC: <25 NG/ML
TEMAZEPAM UR CFM-MCNC: <25 NG/ML
TRAMADOL UR CFM-MCNC: <50 NG/ML
ZOLPIDEM UR CFM-MCNC: <25 NG/ML
ZOLPIDEM UR-MCNC: <25 NG/ML

## 2024-02-15 ENCOUNTER — TELEPHONE (OUTPATIENT)
Dept: PRIMARY CARE | Facility: CLINIC | Age: 64
End: 2024-02-15
Payer: COMMERCIAL

## 2024-02-15 DIAGNOSIS — M47.12 CERVICAL ARTHRITIS WITH MYELOPATHY: ICD-10-CM

## 2024-02-15 RX ORDER — HYDROCODONE BITARTRATE AND ACETAMINOPHEN 10; 325 MG/1; MG/1
1 TABLET ORAL 3 TIMES DAILY PRN
Qty: 90 TABLET | Refills: 0 | Status: SHIPPED | OUTPATIENT
Start: 2024-02-15 | End: 2024-03-20 | Stop reason: SDUPTHER

## 2024-02-26 ENCOUNTER — PATIENT OUTREACH (OUTPATIENT)
Dept: PRIMARY CARE | Facility: CLINIC | Age: 64
End: 2024-02-26
Payer: COMMERCIAL

## 2024-03-19 ENCOUNTER — TELEPHONE (OUTPATIENT)
Dept: PRIMARY CARE | Facility: CLINIC | Age: 64
End: 2024-03-19
Payer: COMMERCIAL

## 2024-03-19 NOTE — TELEPHONE ENCOUNTER
Correction, rx's sent on 2/6/24 x1 year  I talked to patient and told him to check at the pharmacy if he needs any of his Rx's renewed, as we sent them all in on 2/6/24

## 2024-03-20 ENCOUNTER — TELEPHONE (OUTPATIENT)
Dept: PRIMARY CARE | Facility: CLINIC | Age: 64
End: 2024-03-20
Payer: COMMERCIAL

## 2024-03-20 DIAGNOSIS — M47.12 CERVICAL ARTHRITIS WITH MYELOPATHY: ICD-10-CM

## 2024-03-20 RX ORDER — HYDROCODONE BITARTRATE AND ACETAMINOPHEN 10; 325 MG/1; MG/1
1 TABLET ORAL 3 TIMES DAILY PRN
Qty: 90 TABLET | Refills: 0 | Status: SHIPPED | OUTPATIENT
Start: 2024-03-20 | End: 2024-04-22 | Stop reason: SDUPTHER

## 2024-03-25 ENCOUNTER — LAB (OUTPATIENT)
Dept: LAB | Facility: LAB | Age: 64
End: 2024-03-25
Payer: COMMERCIAL

## 2024-03-25 ENCOUNTER — TELEPHONE (OUTPATIENT)
Dept: PRIMARY CARE | Facility: CLINIC | Age: 64
End: 2024-03-25

## 2024-03-25 DIAGNOSIS — J20.9 ACUTE BRONCHITIS, UNSPECIFIED ORGANISM: ICD-10-CM

## 2024-03-25 DIAGNOSIS — N40.1 BENIGN PROSTATIC HYPERPLASIA WITH NOCTURIA: ICD-10-CM

## 2024-03-25 DIAGNOSIS — E11.9 CONTROLLED TYPE 2 DIABETES MELLITUS WITHOUT COMPLICATION, WITHOUT LONG-TERM CURRENT USE OF INSULIN (MULTI): ICD-10-CM

## 2024-03-25 DIAGNOSIS — E78.2 MIXED HYPERLIPIDEMIA: ICD-10-CM

## 2024-03-25 DIAGNOSIS — R35.1 BENIGN PROSTATIC HYPERPLASIA WITH NOCTURIA: ICD-10-CM

## 2024-03-25 LAB
ALBUMIN SERPL BCP-MCNC: 4.5 G/DL (ref 3.4–5)
ALP SERPL-CCNC: 61 U/L (ref 33–136)
ALT SERPL W P-5'-P-CCNC: 19 U/L (ref 10–52)
ANION GAP SERPL CALC-SCNC: 14 MMOL/L (ref 10–20)
AST SERPL W P-5'-P-CCNC: 22 U/L (ref 9–39)
BILIRUB SERPL-MCNC: 0.5 MG/DL (ref 0–1.2)
BUN SERPL-MCNC: 13 MG/DL (ref 6–23)
CALCIUM SERPL-MCNC: 10 MG/DL (ref 8.6–10.3)
CHLORIDE SERPL-SCNC: 104 MMOL/L (ref 98–107)
CHOLEST SERPL-MCNC: 149 MG/DL (ref 0–199)
CHOLESTEROL/HDL RATIO: 4
CO2 SERPL-SCNC: 26 MMOL/L (ref 21–32)
CREAT SERPL-MCNC: 0.78 MG/DL (ref 0.5–1.3)
EGFRCR SERPLBLD CKD-EPI 2021: >90 ML/MIN/1.73M*2
EST. AVERAGE GLUCOSE BLD GHB EST-MCNC: 154 MG/DL
GLUCOSE SERPL-MCNC: 161 MG/DL (ref 74–99)
HBA1C MFR BLD: 7 %
HDLC SERPL-MCNC: 37 MG/DL
LDLC SERPL CALC-MCNC: 66 MG/DL
NON HDL CHOLESTEROL: 112 MG/DL (ref 0–149)
POTASSIUM SERPL-SCNC: 4 MMOL/L (ref 3.5–5.3)
PROT SERPL-MCNC: 6.8 G/DL (ref 6.4–8.2)
PSA SERPL-MCNC: 0.61 NG/ML
SODIUM SERPL-SCNC: 140 MMOL/L (ref 136–145)
TRIGL SERPL-MCNC: 232 MG/DL (ref 0–149)
VLDL: 46 MG/DL (ref 0–40)

## 2024-03-25 PROCEDURE — 80061 LIPID PANEL: CPT

## 2024-03-25 PROCEDURE — 84153 ASSAY OF PSA TOTAL: CPT

## 2024-03-25 PROCEDURE — 36415 COLL VENOUS BLD VENIPUNCTURE: CPT

## 2024-03-25 PROCEDURE — 80053 COMPREHEN METABOLIC PANEL: CPT

## 2024-03-25 PROCEDURE — 83036 HEMOGLOBIN GLYCOSYLATED A1C: CPT

## 2024-03-25 NOTE — TELEPHONE ENCOUNTER
CALLING ABOUT A CANCELLED BLOOD WORK ORDER , HE HAD BLOOD WORK DONE TODAY. [phone], IF SHE IS NOT THERE YOU CAN CALL [phone]. PLEASE CALL PETER BACK. THANK YOU.

## 2024-03-25 NOTE — RESULT ENCOUNTER NOTE
Let him know the labs look good. Sugar a bit high but good A1C. Triglycerides a bit high also due to carbohydrate intake.

## 2024-03-29 ENCOUNTER — OFFICE VISIT (OUTPATIENT)
Dept: PRIMARY CARE | Facility: CLINIC | Age: 64
End: 2024-03-29
Payer: COMMERCIAL

## 2024-03-29 VITALS
BODY MASS INDEX: 32.62 KG/M2 | HEART RATE: 84 BPM | WEIGHT: 203 LBS | DIASTOLIC BLOOD PRESSURE: 82 MMHG | HEIGHT: 66 IN | SYSTOLIC BLOOD PRESSURE: 124 MMHG | OXYGEN SATURATION: 94 %

## 2024-03-29 DIAGNOSIS — E11.9 CONTROLLED TYPE 2 DIABETES MELLITUS WITHOUT COMPLICATION, WITHOUT LONG-TERM CURRENT USE OF INSULIN (MULTI): ICD-10-CM

## 2024-03-29 DIAGNOSIS — G89.4 CHRONIC PAIN SYNDROME: Primary | ICD-10-CM

## 2024-03-29 DIAGNOSIS — M19.012 PRIMARY OSTEOARTHRITIS OF LEFT SHOULDER: ICD-10-CM

## 2024-03-29 PROCEDURE — 20610 DRAIN/INJ JOINT/BURSA W/O US: CPT | Performed by: FAMILY MEDICINE

## 2024-03-29 PROCEDURE — 3008F BODY MASS INDEX DOCD: CPT | Performed by: FAMILY MEDICINE

## 2024-03-29 PROCEDURE — 1160F RVW MEDS BY RX/DR IN RCRD: CPT | Performed by: FAMILY MEDICINE

## 2024-03-29 PROCEDURE — 1036F TOBACCO NON-USER: CPT | Performed by: FAMILY MEDICINE

## 2024-03-29 PROCEDURE — 1157F ADVNC CARE PLAN IN RCRD: CPT | Performed by: FAMILY MEDICINE

## 2024-03-29 PROCEDURE — 3079F DIAST BP 80-89 MM HG: CPT | Performed by: FAMILY MEDICINE

## 2024-03-29 PROCEDURE — 3074F SYST BP LT 130 MM HG: CPT | Performed by: FAMILY MEDICINE

## 2024-03-29 PROCEDURE — 3062F POS MACROALBUMINURIA REV: CPT | Performed by: FAMILY MEDICINE

## 2024-03-29 PROCEDURE — 3051F HG A1C>EQUAL 7.0%<8.0%: CPT | Performed by: FAMILY MEDICINE

## 2024-03-29 PROCEDURE — 4010F ACE/ARB THERAPY RXD/TAKEN: CPT | Performed by: FAMILY MEDICINE

## 2024-03-29 PROCEDURE — 3048F LDL-C <100 MG/DL: CPT | Performed by: FAMILY MEDICINE

## 2024-03-29 PROCEDURE — 99213 OFFICE O/P EST LOW 20 MIN: CPT | Performed by: FAMILY MEDICINE

## 2024-03-29 PROCEDURE — 1159F MED LIST DOCD IN RCRD: CPT | Performed by: FAMILY MEDICINE

## 2024-03-29 RX ORDER — TRIAMCINOLONE ACETONIDE 40 MG/ML
40 INJECTION, SUSPENSION INTRA-ARTICULAR; INTRAMUSCULAR
Status: COMPLETED | OUTPATIENT
Start: 2024-03-29 | End: 2024-03-29

## 2024-03-29 RX ADMIN — TRIAMCINOLONE ACETONIDE 40 MG: 40 INJECTION, SUSPENSION INTRA-ARTICULAR; INTRAMUSCULAR at 11:46

## 2024-03-29 NOTE — PROGRESS NOTES
"Subjective   Patient ID: Kenneth Valenzuela is a 64 y.o. male who presents for Results.    HPI Hereto discuss lab results   Had dental work done with a bridge   Also having less response to his chronic pain   Has not been to pain management so will send him to Dr Cunningham  Would like an injection in the shoulder as it is bad now. Can lift about 65 degrees   Has had TSR on right and needs on left    Labs A1C 7.0 down from 7.6%  PSA is good at 0.61   with LDL of 66  But  which is better than it has been   CMP is good except for . At home he is getting mid to upper 100s. So must go down during the day.     Review of Systems    Objective   /82 (Patient Position: Sitting)   Pulse 84   Ht 1.676 m (5' 6\")   Wt 92.1 kg (203 lb)   SpO2 94%   BMI 32.77 kg/m²     Physical Exam  Constitutional:       General: He is in acute distress (restless in chair).   Cardiovascular:      Rate and Rhythm: Normal rate and regular rhythm.      Heart sounds: No murmur heard.  Pulmonary:      Effort: Pulmonary effort is normal. No respiratory distress.      Breath sounds: Normal breath sounds.   Musculoskeletal:      Comments: Left shoulder tender in axilla  Decreased ROM with abduction     Neurological:      Mental Status: He is alert.     Patient ID: Kenneth Valenzuela is a 64 y.o. male.    Joint Injection Large/Arthrocentesis: L subacromial bursa on 3/29/2024 11:46 AM  Indications: pain  Details: 25 G needle, anteromedial approach  Medications: 40 mg triamcinolone acetonide 40 mg/mL  Outcome: tolerated well, no immediate complications  Procedure, treatment alternatives, risks and benefits explained, specific risks discussed. Patient was prepped and draped in the usual sterile fashion (Prep with Chlorhexidine.  ).         Assessment/Plan   Diagnoses and all orders for this visit:  Chronic pain syndrome  -     Referral to Pain Medicine; Future  Controlled type 2 diabetes mellitus without complication, without long-term current " use of insulin (CMS/[facility])  Primary osteoarthritis of left shoulder  Other orders  -     Joint Injection Large/Arthrocentesis  -     Follow Up In Primary Care - Established; Future

## 2024-04-07 ENCOUNTER — APPOINTMENT (OUTPATIENT)
Dept: RADIOLOGY | Facility: HOSPITAL | Age: 64
End: 2024-04-07
Payer: COMMERCIAL

## 2024-04-07 ENCOUNTER — HOSPITAL ENCOUNTER (EMERGENCY)
Facility: HOSPITAL | Age: 64
Discharge: HOME | End: 2024-04-08
Attending: EMERGENCY MEDICINE
Payer: COMMERCIAL

## 2024-04-07 DIAGNOSIS — S09.90XA CLOSED HEAD INJURY, INITIAL ENCOUNTER: Primary | ICD-10-CM

## 2024-04-07 DIAGNOSIS — T07.XXXA MULTIPLE CONTUSIONS: ICD-10-CM

## 2024-04-07 LAB
ALBUMIN SERPL BCP-MCNC: 4.5 G/DL (ref 3.4–5)
ALP SERPL-CCNC: 75 U/L (ref 33–136)
ALT SERPL W P-5'-P-CCNC: 16 U/L (ref 10–52)
ANION GAP SERPL CALC-SCNC: 12 MMOL/L (ref 10–20)
AST SERPL W P-5'-P-CCNC: 14 U/L (ref 9–39)
BASOPHILS # BLD AUTO: 0.02 X10*3/UL (ref 0–0.1)
BASOPHILS NFR BLD AUTO: 0.3 %
BILIRUB SERPL-MCNC: 0.4 MG/DL (ref 0–1.2)
BUN SERPL-MCNC: 32 MG/DL (ref 6–23)
CALCIUM SERPL-MCNC: 10.7 MG/DL (ref 8.6–10.3)
CHLORIDE SERPL-SCNC: 101 MMOL/L (ref 98–107)
CO2 SERPL-SCNC: 29 MMOL/L (ref 21–32)
CREAT SERPL-MCNC: 1.09 MG/DL (ref 0.5–1.3)
EGFRCR SERPLBLD CKD-EPI 2021: 75 ML/MIN/1.73M*2
EOSINOPHIL # BLD AUTO: 0.13 X10*3/UL (ref 0–0.7)
EOSINOPHIL NFR BLD AUTO: 2 %
ERYTHROCYTE [DISTWIDTH] IN BLOOD BY AUTOMATED COUNT: 14.3 % (ref 11.5–14.5)
GLUCOSE SERPL-MCNC: 205 MG/DL (ref 74–99)
HCT VFR BLD AUTO: 42.2 % (ref 41–52)
HGB BLD-MCNC: 13.6 G/DL (ref 13.5–17.5)
IMM GRANULOCYTES # BLD AUTO: 0.02 X10*3/UL (ref 0–0.7)
IMM GRANULOCYTES NFR BLD AUTO: 0.3 % (ref 0–0.9)
LACTATE SERPL-SCNC: 2.4 MMOL/L (ref 0.4–2)
LYMPHOCYTES # BLD AUTO: 1.74 X10*3/UL (ref 1.2–4.8)
LYMPHOCYTES NFR BLD AUTO: 27.4 %
MCH RBC QN AUTO: 30 PG (ref 26–34)
MCHC RBC AUTO-ENTMCNC: 32.2 G/DL (ref 32–36)
MCV RBC AUTO: 93 FL (ref 80–100)
MONOCYTES # BLD AUTO: 0.54 X10*3/UL (ref 0.1–1)
MONOCYTES NFR BLD AUTO: 8.5 %
NEUTROPHILS # BLD AUTO: 3.9 X10*3/UL (ref 1.2–7.7)
NEUTROPHILS NFR BLD AUTO: 61.5 %
NRBC BLD-RTO: 0 /100 WBCS (ref 0–0)
PLATELET # BLD AUTO: 237 X10*3/UL (ref 150–450)
POTASSIUM SERPL-SCNC: 4.2 MMOL/L (ref 3.5–5.3)
PROT SERPL-MCNC: 6.8 G/DL (ref 6.4–8.2)
RBC # BLD AUTO: 4.54 X10*6/UL (ref 4.5–5.9)
SODIUM SERPL-SCNC: 138 MMOL/L (ref 136–145)
WBC # BLD AUTO: 6.4 X10*3/UL (ref 4.4–11.3)

## 2024-04-07 PROCEDURE — 2500000004 HC RX 250 GENERAL PHARMACY W/ HCPCS (ALT 636 FOR OP/ED): Performed by: EMERGENCY MEDICINE

## 2024-04-07 PROCEDURE — 72125 CT NECK SPINE W/O DYE: CPT

## 2024-04-07 PROCEDURE — 72125 CT NECK SPINE W/O DYE: CPT | Performed by: RADIOLOGY

## 2024-04-07 PROCEDURE — 96374 THER/PROPH/DIAG INJ IV PUSH: CPT | Mod: 59

## 2024-04-07 PROCEDURE — 74177 CT ABD & PELVIS W/CONTRAST: CPT | Performed by: STUDENT IN AN ORGANIZED HEALTH CARE EDUCATION/TRAINING PROGRAM

## 2024-04-07 PROCEDURE — 72070 X-RAY EXAM THORAC SPINE 2VWS: CPT | Performed by: RADIOLOGY

## 2024-04-07 PROCEDURE — 96376 TX/PRO/DX INJ SAME DRUG ADON: CPT

## 2024-04-07 PROCEDURE — 36415 COLL VENOUS BLD VENIPUNCTURE: CPT | Performed by: EMERGENCY MEDICINE

## 2024-04-07 PROCEDURE — 72131 CT LUMBAR SPINE W/O DYE: CPT | Performed by: RADIOLOGY

## 2024-04-07 PROCEDURE — 2550000001 HC RX 255 CONTRASTS: Performed by: EMERGENCY MEDICINE

## 2024-04-07 PROCEDURE — 96375 TX/PRO/DX INJ NEW DRUG ADDON: CPT

## 2024-04-07 PROCEDURE — 73502 X-RAY EXAM HIP UNI 2-3 VIEWS: CPT | Mod: LT

## 2024-04-07 PROCEDURE — 74177 CT ABD & PELVIS W/CONTRAST: CPT

## 2024-04-07 PROCEDURE — 71260 CT THORAX DX C+: CPT | Performed by: STUDENT IN AN ORGANIZED HEALTH CARE EDUCATION/TRAINING PROGRAM

## 2024-04-07 PROCEDURE — 73502 X-RAY EXAM HIP UNI 2-3 VIEWS: CPT | Mod: LEFT SIDE | Performed by: RADIOLOGY

## 2024-04-07 PROCEDURE — 70450 CT HEAD/BRAIN W/O DYE: CPT | Performed by: RADIOLOGY

## 2024-04-07 PROCEDURE — 96361 HYDRATE IV INFUSION ADD-ON: CPT

## 2024-04-07 PROCEDURE — 70450 CT HEAD/BRAIN W/O DYE: CPT

## 2024-04-07 PROCEDURE — 83605 ASSAY OF LACTIC ACID: CPT | Performed by: EMERGENCY MEDICINE

## 2024-04-07 PROCEDURE — 72131 CT LUMBAR SPINE W/O DYE: CPT

## 2024-04-07 PROCEDURE — 80053 COMPREHEN METABOLIC PANEL: CPT | Performed by: EMERGENCY MEDICINE

## 2024-04-07 PROCEDURE — 72070 X-RAY EXAM THORAC SPINE 2VWS: CPT

## 2024-04-07 PROCEDURE — 99285 EMERGENCY DEPT VISIT HI MDM: CPT | Mod: 25

## 2024-04-07 PROCEDURE — 85025 COMPLETE CBC W/AUTO DIFF WBC: CPT | Performed by: EMERGENCY MEDICINE

## 2024-04-07 RX ORDER — MORPHINE SULFATE 2 MG/ML
2 INJECTION, SOLUTION INTRAMUSCULAR; INTRAVENOUS ONCE
Status: COMPLETED | OUTPATIENT
Start: 2024-04-07 | End: 2024-04-07

## 2024-04-07 RX ORDER — KETOROLAC TROMETHAMINE 30 MG/ML
15 INJECTION, SOLUTION INTRAMUSCULAR; INTRAVENOUS ONCE
Status: COMPLETED | OUTPATIENT
Start: 2024-04-07 | End: 2024-04-07

## 2024-04-07 RX ORDER — SODIUM CHLORIDE 9 MG/ML
125 INJECTION, SOLUTION INTRAVENOUS CONTINUOUS
Status: DISCONTINUED | OUTPATIENT
Start: 2024-04-07 | End: 2024-04-08 | Stop reason: HOSPADM

## 2024-04-07 RX ADMIN — SODIUM CHLORIDE 125 ML/HR: 9 INJECTION, SOLUTION INTRAVENOUS at 22:55

## 2024-04-07 RX ADMIN — IOHEXOL 90 ML: 350 INJECTION, SOLUTION INTRAVENOUS at 23:50

## 2024-04-07 RX ADMIN — MORPHINE SULFATE 2 MG: 2 INJECTION, SOLUTION INTRAMUSCULAR; INTRAVENOUS at 22:55

## 2024-04-07 RX ADMIN — SODIUM CHLORIDE 500 ML: 9 INJECTION, SOLUTION INTRAVENOUS at 22:55

## 2024-04-07 RX ADMIN — KETOROLAC TROMETHAMINE 15 MG: 30 INJECTION, SOLUTION INTRAMUSCULAR; INTRAVENOUS at 21:25

## 2024-04-07 ASSESSMENT — PAIN SCALES - GENERAL
PAINLEVEL_OUTOF10: 4
PAINLEVEL_OUTOF10: 9
PAINLEVEL_OUTOF10: 5 - MODERATE PAIN

## 2024-04-07 ASSESSMENT — PAIN DESCRIPTION - ORIENTATION: ORIENTATION_2: LEFT

## 2024-04-07 ASSESSMENT — COLUMBIA-SUICIDE SEVERITY RATING SCALE - C-SSRS
1. IN THE PAST MONTH, HAVE YOU WISHED YOU WERE DEAD OR WISHED YOU COULD GO TO SLEEP AND NOT WAKE UP?: NO
2. HAVE YOU ACTUALLY HAD ANY THOUGHTS OF KILLING YOURSELF?: NO
6. HAVE YOU EVER DONE ANYTHING, STARTED TO DO ANYTHING, OR PREPARED TO DO ANYTHING TO END YOUR LIFE?: NO

## 2024-04-07 ASSESSMENT — PAIN - FUNCTIONAL ASSESSMENT: PAIN_FUNCTIONAL_ASSESSMENT: 0-10

## 2024-04-07 ASSESSMENT — PAIN DESCRIPTION - LOCATION
LOCATION_2: LEG
LOCATION: BACK

## 2024-04-08 VITALS
SYSTOLIC BLOOD PRESSURE: 160 MMHG | TEMPERATURE: 97 F | BODY MASS INDEX: 30.37 KG/M2 | RESPIRATION RATE: 18 BRPM | WEIGHT: 189 LBS | HEIGHT: 66 IN | OXYGEN SATURATION: 95 % | DIASTOLIC BLOOD PRESSURE: 105 MMHG | HEART RATE: 79 BPM

## 2024-04-08 PROCEDURE — 2500000001 HC RX 250 WO HCPCS SELF ADMINISTERED DRUGS (ALT 637 FOR MEDICARE OP): Performed by: EMERGENCY MEDICINE

## 2024-04-08 PROCEDURE — 2500000004 HC RX 250 GENERAL PHARMACY W/ HCPCS (ALT 636 FOR OP/ED): Performed by: EMERGENCY MEDICINE

## 2024-04-08 RX ORDER — OXYCODONE AND ACETAMINOPHEN 5; 325 MG/1; MG/1
1 TABLET ORAL EVERY 6 HOURS PRN
Qty: 4 TABLET | Refills: 0 | Status: ACTIVE
Start: 2024-04-08 | End: 2024-04-08 | Stop reason: WASHOUT

## 2024-04-08 RX ORDER — KETOROLAC TROMETHAMINE 10 MG/1
10 TABLET, FILM COATED ORAL EVERY 6 HOURS PRN
Qty: 20 TABLET | Refills: 0 | Status: SHIPPED | OUTPATIENT
Start: 2024-04-08 | End: 2024-04-13

## 2024-04-08 RX ORDER — OXYCODONE AND ACETAMINOPHEN 5; 325 MG/1; MG/1
2 TABLET ORAL ONCE
Status: DISCONTINUED | OUTPATIENT
Start: 2024-04-08 | End: 2024-04-08

## 2024-04-08 RX ORDER — OXYCODONE AND ACETAMINOPHEN 5; 325 MG/1; MG/1
4 TABLET ORAL ONCE
Status: COMPLETED | OUTPATIENT
Start: 2024-04-08 | End: 2024-04-08

## 2024-04-08 RX ORDER — MORPHINE SULFATE 2 MG/ML
2 INJECTION, SOLUTION INTRAMUSCULAR; INTRAVENOUS ONCE
Status: COMPLETED | OUTPATIENT
Start: 2024-04-08 | End: 2024-04-08

## 2024-04-08 RX ADMIN — OXYCODONE HYDROCHLORIDE AND ACETAMINOPHEN 4 TABLET: 5; 325 TABLET ORAL at 08:06

## 2024-04-08 RX ADMIN — MORPHINE SULFATE 2 MG: 2 INJECTION, SOLUTION INTRAMUSCULAR; INTRAVENOUS at 03:51

## 2024-04-08 ASSESSMENT — PAIN - FUNCTIONAL ASSESSMENT: PAIN_FUNCTIONAL_ASSESSMENT: 0-10

## 2024-04-08 ASSESSMENT — PAIN SCALES - GENERAL: PAINLEVEL_OUTOF10: 7

## 2024-04-08 NOTE — ED PROVIDER NOTES
HPI   Chief Complaint   Patient presents with    Fall     To er per afd from home with c/o back pain and left leg pain s/p fall in the bathroom. Denies hitting his head       64-year-old male presents with chief complaint of back, right rib and left hip pain.  Patient fell in the bathroom hitting his head and injuring his back and left hip.  Patient is complaining of some pain with inspiration and movement.  Patient denies any loss of consciousness.  Patient will have an IV established and be given Toradol 15 mg IV for pain.  Patient received 2 doses of morphine sulfate IV with some moderate improvement.  I did speak to trauma on-call Dr. Gilbert and I agree with her recommendation.  CAT scan shows multiple old fractures and he can be discharged home.  Patient needs to speak to Dr. Burleson about outpatient physical therapy and treatment.  Patient is improved and stable upon discharge.      History provided by:  Patient                      [place] Coma Scale Score: 15                     Patient History   Past Medical History:   Diagnosis Date    Body mass index (BMI) 36.0-36.9, adult 03/04/2021    Body mass index (BMI) of 36.0 to 36.9 in adult    Impaired fasting glucose 03/04/2021    IFG (impaired fasting glucose)    Major depressive disorder, single episode, severe without psychotic features (CMS/[facility]) 06/10/2021    Severe depression    Personal history of other diseases of the respiratory system 01/03/2022    History of acute sinusitis    Personal history of other specified conditions 02/08/2021    History of urinary urgency    Personal history of other specified conditions 06/10/2020    History of nocturia    Personal history of other specified conditions 02/08/2021    History of urinary frequency    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    Rib pain on right side    Unspecified fall, initial encounter 06/10/2021    Fall in home, initial encounter     Past Surgical History:   Procedure Laterality Date    CT ANGIO CORONARY ART  WITH HEARTFLOW IF SCORE >30%  10/1/2022    CT ANGIO CORONARY ART WITH HEARTFLOW IF SCORE >30% 10/1/2022    CT ANGIO NECK  12/4/2023    CT ANGIO NECK 12/4/2023    CT HEAD ANGIO W AND WO IV CONTRAST  10/1/2022    CT HEAD ANGIO W AND WO IV CONTRAST 10/1/2022    OTHER SURGICAL HISTORY  11/21/2019    Finger amputation    OTHER SURGICAL HISTORY  11/21/2019    Vasectomy    OTHER SURGICAL HISTORY  11/21/2019    Rotator cuff repair    OTHER SURGICAL HISTORY  11/21/2019    Surgery    OTHER SURGICAL HISTORY  11/21/2019    Knee replacement    OTHER SURGICAL HISTORY  09/14/2021    Prostate surgery    OTHER SURGICAL HISTORY  06/10/2021    Insertion of prostatic urethral lift implant    OTHER SURGICAL HISTORY  10/28/2022    Craniotomy    OTHER SURGICAL HISTORY  11/18/2015    Colonoscopy    REVERSE TOTAL SHOULDER ARTHROPLASTY Right 09/25/2023     Family History   Problem Relation Name Age of Onset    Diabetes Mother      COPD Mother      Hypertension Father      Heart attack Father      Diabetes Sister      Diabetes Other       Social History     Tobacco Use    Smoking status: Former     Types: Cigarettes    Smokeless tobacco: Never   Vaping Use    Vaping Use: Never used   Substance Use Topics    Alcohol use: Never    Drug use: Never       Physical Exam   ED Triage Vitals [04/07/24 2050]   Temperature Heart Rate Respirations BP   36.1 °C (97 °F) (!) 107 20 (!) 132/96      Pulse Ox Temp Source Heart Rate Source Patient Position   94 % Tympanic Monitor --      BP Location FiO2 (%)     -- --       Physical Exam  Vitals and nursing note reviewed.   Constitutional:       General: He is not in acute distress.     Appearance: He is well-developed. He is obese.   HENT:      Head: Normocephalic and atraumatic.   Eyes:      Conjunctiva/sclera: Conjunctivae normal.   Cardiovascular:      Rate and Rhythm: Normal rate and regular rhythm.      Heart sounds: No murmur heard.  Pulmonary:      Effort: Pulmonary effort is normal. No respiratory  distress.      Breath sounds: Normal breath sounds.   Abdominal:      Palpations: Abdomen is soft.      Tenderness: There is no abdominal tenderness.   Musculoskeletal:         General: No swelling.      Cervical back: Neck supple.      Comments: Pain with flexion extension along the dorsum of the spine.  Tenderness with movement in the left hip.  Palpable tenderness over the posterior lateral right ribs with mild erythema there.   Skin:     General: Skin is warm and dry.      Capillary Refill: Capillary refill takes less than 2 seconds.   Neurological:      Mental Status: He is alert.   Psychiatric:         Mood and Affect: Mood normal.       Labs Reviewed   COMPREHENSIVE METABOLIC PANEL - Abnormal       Result Value    Glucose 205 (*)     Sodium 138      Potassium 4.2      Chloride 101      Bicarbonate 29      Anion Gap 12      Urea Nitrogen 32 (*)     Creatinine 1.09      eGFR 75      Calcium 10.7 (*)     Albumin 4.5      Alkaline Phosphatase 75      Total Protein 6.8      AST 14      Bilirubin, Total 0.4      ALT 16     LACTATE - Abnormal    Lactate 2.4 (*)     Narrative:     Venipuncture immediately after or during the administration of Metamizole may lead to falsely low results. Testing should be performed immediately  prior to Metamizole dosing.   CBC WITH AUTO DIFFERENTIAL    WBC 6.4      nRBC 0.0      RBC 4.54      Hemoglobin 13.6      Hematocrit 42.2      MCV 93      MCH 30.0      MCHC 32.2      RDW 14.3      Platelets 237      Neutrophils % 61.5      Immature Granulocytes %, Automated 0.3      Lymphocytes % 27.4      Monocytes % 8.5      Eosinophils % 2.0      Basophils % 0.3      Neutrophils Absolute 3.90      Immature Granulocytes Absolute, Automated 0.02      Lymphocytes Absolute 1.74      Monocytes Absolute 0.54      Eosinophils Absolute 0.13      Basophils Absolute 0.02     LACTATE      CT chest abdomen pelvis w IV contrast   Final Result   1. Subacute-chronic appearing nondisplaced fracture at the  S5 sacral   level without surrounding edema and demonstrating sclerosis and   partial bony bridging, new from 12/04/2023.  Otherwise, no acute   traumatic injury in the chest, abdomen, or pelvis.        2. Several dozen chronic nondisplaced bilateral rib fractures,   chronic fracture of the right inferior and superior pubic ramus,   chronic left clavicle fracture.        3. Two adjacent chronic retroperitoneal hematomas in the right   pararenal space new location previous large acute right   retroperitoneal hematoma on 12/04/2023, measuring 3.1 cm and 4.4 cm   in maximal dimension. These are located between the IVC, pancreatic   head, and descending/transverse duodenum. There likely residual   stricturing of the descending duodenum at the site of these   hemorrhages. The degree to which this is responsible for the large   amount of retained gastric content is unclear noted, however   recommend gastroenterology follow-up for endoscopic visualization.        4. Nonobstructing bilateral renal calculi.        5. 9 cm lipoma in the left chest wall.        Additional chronic nonacute findings as above.        MACRO:   None.        Signed by: Geronimo Melendez 4/8/2024 1:39 AM   Dictation workstation:   [id number]      XR thoracic spine 2 views   Final Result   No acute osseous abnormality identified.        MACRO:   None        Signed by: Lela Em 4/7/2024 11:07 PM   Dictation workstation:   [id number]      XR hip left with pelvis when performed 2 or 3 views   Final Result   Advanced degenerative changes of the left hip, similar to prior   imaging. No acute osseous abnormality identified             MACRO:   None        Signed by: Lela Em 4/7/2024 11:13 PM   Dictation workstation:   [id number]      CT cervical spine wo IV contrast   Final Result   No acute intracranial hemorrhage or mass effect.        Postsurgical changes and multifocal encephalomalacia, similar to   prior imaging.        No acute  fracture or traumatic subluxation of the cervical spine.        Reversal of the cervical curvature and degenerative changes without   critical canal stenosis.        MACRO:   None.        Signed by: Lela Em 4/7/2024 10:57 PM   Dictation workstation:   [id number]      CT head wo IV contrast   Final Result   No acute intracranial hemorrhage or mass effect.        Postsurgical changes and multifocal encephalomalacia, similar to   prior imaging.        No acute fracture or traumatic subluxation of the cervical spine.        Reversal of the cervical curvature and degenerative changes without   critical canal stenosis.        MACRO:   None.        Signed by: Lela Em 4/7/2024 10:57 PM   Dictation workstation:   [id number]      CT lumbar spine wo IV contrast   Final Result   No acute fracture or traumatic subluxation of the lumbar spine.        Degenerative changes without critical canal stenosis.        MACRO:   None.        Signed by: Lela Em 4/7/2024 11:02 PM   Dictation workstation:   [id number]         Labs Reviewed   COMPREHENSIVE METABOLIC PANEL - Abnormal       Result Value    Glucose 205 (*)     Sodium 138      Potassium 4.2      Chloride 101      Bicarbonate 29      Anion Gap 12      Urea Nitrogen 32 (*)     Creatinine 1.09      eGFR 75      Calcium 10.7 (*)     Albumin 4.5      Alkaline Phosphatase 75      Total Protein 6.8      AST 14      Bilirubin, Total 0.4      ALT 16     LACTATE - Abnormal    Lactate 2.4 (*)     Narrative:     Venipuncture immediately after or during the administration of Metamizole may lead to falsely low results. Testing should be performed immediately  prior to Metamizole dosing.   CBC WITH AUTO DIFFERENTIAL    WBC 6.4      nRBC 0.0      RBC 4.54      Hemoglobin 13.6      Hematocrit 42.2      MCV 93      MCH 30.0      MCHC 32.2      RDW 14.3      Platelets 237      Neutrophils % 61.5      Immature Granulocytes %, Automated 0.3      Lymphocytes % 27.4       Monocytes % 8.5      Eosinophils % 2.0      Basophils % 0.3      Neutrophils Absolute 3.90      Immature Granulocytes Absolute, Automated 0.02      Lymphocytes Absolute 1.74      Monocytes Absolute 0.54      Eosinophils Absolute 0.13      Basophils Absolute 0.02     LACTATE      CT chest abdomen pelvis w IV contrast   Final Result   1. Subacute-chronic appearing nondisplaced fracture at the S5 sacral   level without surrounding edema and demonstrating sclerosis and   partial bony bridging, new from 12/04/2023.  Otherwise, no acute   traumatic injury in the chest, abdomen, or pelvis.        2. Several dozen chronic nondisplaced bilateral rib fractures,   chronic fracture of the right inferior and superior pubic ramus,   chronic left clavicle fracture.        3. Two adjacent chronic retroperitoneal hematomas in the right   pararenal space new location previous large acute right   retroperitoneal hematoma on 12/04/2023, measuring 3.1 cm and 4.4 cm   in maximal dimension. These are located between the IVC, pancreatic   head, and descending/transverse duodenum. There likely residual   stricturing of the descending duodenum at the site of these   hemorrhages. The degree to which this is responsible for the large   amount of retained gastric content is unclear noted, however   recommend gastroenterology follow-up for endoscopic visualization.        4. Nonobstructing bilateral renal calculi.        5. 9 cm lipoma in the left chest wall.        Additional chronic nonacute findings as above.        MACRO:   None.        Signed by: Geronimo Melendez 4/8/2024 1:39 AM   Dictation workstation:   [id number]      XR thoracic spine 2 views   Final Result   No acute osseous abnormality identified.        MACRO:   None        Signed by: Lela Em 4/7/2024 11:07 PM   Dictation workstation:   [id number]      XR hip left with pelvis when performed 2 or 3 views   Final Result   Advanced degenerative changes of the left hip,  similar to prior   imaging. No acute osseous abnormality identified             MACRO:   None        Signed by: Lela Em 4/7/2024 11:13 PM   Dictation workstation:   [id number]      CT cervical spine wo IV contrast   Final Result   No acute intracranial hemorrhage or mass effect.        Postsurgical changes and multifocal encephalomalacia, similar to   prior imaging.        No acute fracture or traumatic subluxation of the cervical spine.        Reversal of the cervical curvature and degenerative changes without   critical canal stenosis.        MACRO:   None.        Signed by: Lela Em 4/7/2024 10:57 PM   Dictation workstation:   [id number]      CT head wo IV contrast   Final Result   No acute intracranial hemorrhage or mass effect.        Postsurgical changes and multifocal encephalomalacia, similar to   prior imaging.        No acute fracture or traumatic subluxation of the cervical spine.        Reversal of the cervical curvature and degenerative changes without   critical canal stenosis.        MACRO:   None.        Signed by: Lela Em 4/7/2024 10:57 PM   Dictation workstation:   [id number]      CT lumbar spine wo IV contrast   Final Result   No acute fracture or traumatic subluxation of the lumbar spine.        Degenerative changes without critical canal stenosis.        MACRO:   None.        Signed by: Lela Em 4/7/2024 11:02 PM   Dictation workstation:   [id number]         ED Course & MDM   Diagnoses as of 04/08/24 0331   Closed head injury, initial encounter   Multiple contusions       Medical Decision Making  1 take medication as prescribed 2 follow-up with family medical doctor in 1 to 2 days for outpatient physical therapy.  Any worsening of symptoms return to ED.        Procedure  Procedures     Wilberto Anders DO  04/08/24 0341

## 2024-04-11 ENCOUNTER — TELEPHONE (OUTPATIENT)
Dept: PRIMARY CARE | Facility: CLINIC | Age: 64
End: 2024-04-11
Payer: COMMERCIAL

## 2024-04-11 NOTE — TELEPHONE ENCOUNTER
Kenneth has a referral to a pain clinic in [place], he would like a referral to pain clinic in [place] instead

## 2024-04-11 NOTE — TELEPHONE ENCOUNTER
Pain clinic received referral for patient today to see Dr. Cunningham in [place] office.  Dr. Cunningham starts in May, but they will be contacting patient to schedule

## 2024-04-12 ENCOUNTER — TELEPHONE (OUTPATIENT)
Dept: PRIMARY CARE | Facility: CLINIC | Age: 64
End: 2024-04-12
Payer: COMMERCIAL

## 2024-04-12 ENCOUNTER — TELEPHONE (OUTPATIENT)
Dept: PAIN MEDICINE | Facility: CLINIC | Age: 64
End: 2024-04-12
Payer: COMMERCIAL

## 2024-04-12 NOTE — TELEPHONE ENCOUNTER
Since the dx is J89.4 chronic pain syndrome, Dr. Burleson would want Dr. Cunningham to do med management, and he's the only one that can start that protocol, but he's not in network with [facility] and won't be for quite sometime. She can go ahead and schedule one of the girls for the normal intervention of therapy, but they're not going to start that protocol and if Dr. Cunningham can't see him, they can't even do procedures. Asking for a call back at [phone] option 4

## 2024-04-22 ENCOUNTER — TELEPHONE (OUTPATIENT)
Dept: PRIMARY CARE | Facility: CLINIC | Age: 64
End: 2024-04-22
Payer: COMMERCIAL

## 2024-04-22 DIAGNOSIS — F32.1 DEPRESSION, MAJOR, SINGLE EPISODE, MODERATE (MULTI): ICD-10-CM

## 2024-04-22 DIAGNOSIS — M47.12 CERVICAL ARTHRITIS WITH MYELOPATHY: ICD-10-CM

## 2024-04-22 RX ORDER — HYDROCODONE BITARTRATE AND ACETAMINOPHEN 10; 325 MG/1; MG/1
1 TABLET ORAL 3 TIMES DAILY PRN
Qty: 90 TABLET | Refills: 0 | Status: SHIPPED | OUTPATIENT
Start: 2024-04-22 | End: 2024-05-21 | Stop reason: SDUPTHER

## 2024-04-22 NOTE — TELEPHONE ENCOUNTER
Called patient and let him know there was 1 year of refills on Rx from February.  I called [facility] to let them know ok to fill early.

## 2024-04-22 NOTE — TELEPHONE ENCOUNTER
Asking for a call back about the new medication. Thinks there are too many side effects and doesn't want to be a guinea pig.

## 2024-04-22 NOTE — TELEPHONE ENCOUNTER
I called patient and let him know that the generic Toradol is ok to take short term like it was Rx.  He should take it with food.

## 2024-05-09 ENCOUNTER — OFFICE VISIT (OUTPATIENT)
Dept: URGENT CARE | Facility: CLINIC | Age: 64
End: 2024-05-09
Payer: COMMERCIAL

## 2024-05-09 VITALS
BODY MASS INDEX: 30.37 KG/M2 | TEMPERATURE: 98.6 F | SYSTOLIC BLOOD PRESSURE: 113 MMHG | HEART RATE: 90 BPM | OXYGEN SATURATION: 94 % | RESPIRATION RATE: 18 BRPM | DIASTOLIC BLOOD PRESSURE: 68 MMHG | HEIGHT: 66 IN | WEIGHT: 189 LBS

## 2024-05-09 DIAGNOSIS — L30.9 ECZEMA OF RIGHT UPPER EXTREMITY: Primary | ICD-10-CM

## 2024-05-09 PROCEDURE — 99213 OFFICE O/P EST LOW 20 MIN: CPT | Performed by: NURSE PRACTITIONER

## 2024-05-09 RX ORDER — PREDNISONE 20 MG/1
20 TABLET ORAL DAILY
Qty: 5 TABLET | Refills: 0 | Status: SHIPPED | OUTPATIENT
Start: 2024-05-09 | End: 2024-05-14

## 2024-05-09 RX ORDER — TRIAMCINOLONE ACETONIDE 5 MG/G
CREAM TOPICAL 3 TIMES DAILY
Qty: 30 G | Refills: 0 | Status: SHIPPED | OUTPATIENT
Start: 2024-05-09

## 2024-05-09 NOTE — PROGRESS NOTES
64 y.o. male presents for evaluation of rash to right medial bicep and elbow that began 2 weeks ago.  Patient states he has been on the grass and does have grass allergy.  Denies fever, drainage from rash, streaking, sore throat, difficulty breathing, nausea/vomiting, diarrhea or any other associated symptom or complaint.  No OTC meds or symptom management.  No other complaints.      Vitals:    24 1245   BP: 113/68   Pulse: 90   Resp: 18   Temp: 37 °C (98.6 °F)   SpO2: 94%       Allergies   Allergen Reactions    Penicillins Unknown    Phenytoin Hives, Rash and Unknown       Medication Documentation Review Audit       Reviewed by Ibrahima Seymour MA (Medical Assistant) on 24 at 1245      Medication Order Taking? Sig Documenting Provider Last Dose Status   albuterol (Ventolin HFA) 90 mcg/actuation inhaler [id number]  Inhale 2 puffs every 6 hours if needed for wheezing or shortness of breath. Nils Burleson MD   24 3749   apple cider vinegar 600 mg capsule [id number] Yes Take 1 capsule by mouth once daily. Historical Provider, MD Taking Active   ascorbic acid (Vitamin C) 1,000 mg tablet [id number] Yes Take 1 tablet (1,000 mg) by mouth once daily. Historical Provider, MD Taking Active   cholecalciferol (Vitamin D-3) 50 mcg (2,000 unit) capsule [id number] Yes Take 1 capsule (50 mcg) by mouth once daily. Historical Provider, MD Taking Active   choline fenofibrate (Trilipix) 135 mg DR capsule [id number] Yes Take 1 capsule (135 mg) by mouth once daily. Nils Burleson MD Taking Active   DULoxetine (Cymbalta) 30 mg DR capsule [id number] Yes Take 1 capsule (30 mg) by mouth once daily. Nils Burleson MD Taking Active   DULoxetine (Cymbalta) 60 mg DR capsule [id number] Yes Take 1 capsule (60 mg) by mouth once daily. Nils Burleson MD Taking Active   fluticasone (Flonase) 50 mcg/actuation nasal spray [id number] Yes Administer 1 spray into each nostril once daily. PRN Nils Burleson MD Taking Active    HYDROcodone-acetaminophen (Norco)  mg tablet [id number] Yes Take 1 tablet by mouth 3 times a day as needed for severe pain (7 - 10). Nils Burleson MD Taking Active   irbesartan (Avapro) 75 mg tablet [id number] Yes Take 1 tablet (75 mg) by mouth once daily. Nils Burleson MD Taking Active   levETIRAcetam (Keppra) 500 mg tablet [id number] Yes Take 1 tablet (500 mg) by mouth 2 times a day. Nils Burleson MD Taking Active   meloxicam (Mobic) 15 mg tablet [id number] Yes Take 1 tablet (15 mg) by mouth once daily. Nils Burleson MD Taking Active   metoprolol succinate XL (Toprol-XL) 25 mg 24 hr tablet [id number] Yes Take 1 tablet (25 mg) by mouth once daily. Nils Burleson MD Taking Active   milk thistle 175 mg tablet [id number] Yes Take 1 tablet (175 mg) by mouth once daily. Historical Provider, MD Taking Active   modafinil (Provigil) 200 mg tablet [id number] Yes Take 1 tablet (200 mg) by mouth once daily. Historical Provider, MD Taking Active   naloxone (Narcan) 4 mg/0.1 mL nasal spray [id number] Yes Administer 1 spray (4 mg) into affected nostril(s) if needed for respiratory depression. Historical Provider, MD Taking Active   potassium chloride ER (Micro-K) 10 mEq ER capsule [id number] Yes Take 1 capsule (10 mEq) by mouth once daily. Nils Burleson MD Taking Active   QUEtiapine (SEROquel) 25 mg tablet [id number] Yes Take 1 tablet (25 mg) by mouth 3 times a day. Nils Burleson MD Taking Active   SF 5000 Plus 1.1 % dental cream [id number] Yes Apply to teeth once daily. Historical Provider, MD Taking Active   sildenafil (Viagra) 100 mg tablet [id number] Yes Take 1 tablet (100 mg) by mouth if needed. Historical Provider, MD Taking Active                    Past Medical History:   Diagnosis Date    Body mass index (BMI) 36.0-36.9, adult 03/04/2021    Body mass index (BMI) of 36.0 to 36.9 in adult    Impaired fasting glucose 03/04/2021    IFG (impaired fasting glucose)    Major depressive disorder, single episode, severe  without psychotic features ([facility]) 06/10/2021    Severe depression    Personal history of other diseases of the respiratory system 01/03/2022    History of acute sinusitis    Personal history of other specified conditions 02/08/2021    History of urinary urgency    Personal history of other specified conditions 06/10/2020    History of nocturia    Personal history of other specified conditions 02/08/2021    History of urinary frequency    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    Rib pain on right side    Unspecified fall, initial encounter 06/10/2021    Fall in home, initial encounter       Past Surgical History:   Procedure Laterality Date    CT ANGIO CORONARY ART WITH HEARTFLOW IF SCORE >30%  10/1/2022    CT ANGIO CORONARY ART WITH HEARTFLOW IF SCORE >30% 10/1/2022    CT ANGIO NECK  12/4/2023    CT ANGIO NECK 12/4/2023    CT HEAD ANGIO W AND WO IV CONTRAST  10/1/2022    CT HEAD ANGIO W AND WO IV CONTRAST 10/1/2022    OTHER SURGICAL HISTORY  11/21/2019    Finger amputation    OTHER SURGICAL HISTORY  11/21/2019    Vasectomy    OTHER SURGICAL HISTORY  11/21/2019    Rotator cuff repair    OTHER SURGICAL HISTORY  11/21/2019    Surgery    OTHER SURGICAL HISTORY  11/21/2019    Knee replacement    OTHER SURGICAL HISTORY  09/14/2021    Prostate surgery    OTHER SURGICAL HISTORY  06/10/2021    Insertion of prostatic urethral lift implant    OTHER SURGICAL HISTORY  10/28/2022    Craniotomy    OTHER SURGICAL HISTORY  11/18/2015    Colonoscopy    REVERSE TOTAL SHOULDER ARTHROPLASTY Right 09/25/2023       ROS  See HPI    Physical Exam  Vitals and nursing note reviewed.   Constitutional:       General: He is not in acute distress.     Appearance: Normal appearance. He is normal weight. He is not ill-appearing or toxic-appearing.   Skin:     General: Skin is warm and dry.      Findings: Rash (scaley rash without erythema or drainage to right medial elbow and bicep) present.   Neurological:      General: No focal deficit present.      Mental  Status: He is alert and oriented to person, place, and time.   Psychiatric:         Mood and Affect: Mood normal.         Behavior: Behavior normal.         Thought Content: Thought content normal.         Judgment: Judgment normal.           Assessment/Plan/MDM  Kenneth was seen today for rash.  Diagnoses and all orders for this visit:  Eczema of right upper extremity (Primary)  -     triamcinolone (Kenalog) 0.5 % cream; Apply topically 3 times a day.  -     predniSONE (Deltasone) 20 mg tablet; Take 1 tablet (20 mg) by mouth once daily for 5 days.    Patient's clinical presentation is otherwise unremarkable at this time. Patient is discharged with instructions to follow-up with primary care or seek emergency medical attention for worsening symptoms or any new concerns.    I did personally review Kenneth's past medical history, surgical history, social history, as well as family history (when relevant).  In this case, I also oversaw the his drug management by reviewing his medication list, allergy list, as well as the medications that I prescribed during the UC course and/or recommended as an out-patient (including possible OTC medications such as acetaminophen, NSAIDs , etc).    After reviewing the items above, I did not look at previous medical documentation, such as recent hospitalizations, office visits, and/or recent consultations with PCP/specialist.                          SDOH:   Another factor that I considered in Kenneth's care was his Social Determinants of Health (SDOH). During this UC encounter, he did not have social determinants of health. Those SDOH influencing Kenneth's care are: none      Nitish Philip CNP  [facility]  [phone]

## 2024-05-14 ENCOUNTER — HOSPITAL ENCOUNTER (EMERGENCY)
Facility: HOSPITAL | Age: 64
Discharge: HOME | End: 2024-05-14
Payer: COMMERCIAL

## 2024-05-14 ENCOUNTER — APPOINTMENT (OUTPATIENT)
Dept: RADIOLOGY | Facility: HOSPITAL | Age: 64
End: 2024-05-14
Payer: COMMERCIAL

## 2024-05-14 VITALS
WEIGHT: 190 LBS | OXYGEN SATURATION: 95 % | HEART RATE: 98 BPM | DIASTOLIC BLOOD PRESSURE: 100 MMHG | RESPIRATION RATE: 18 BRPM | HEIGHT: 66 IN | TEMPERATURE: 97.3 F | BODY MASS INDEX: 30.53 KG/M2 | SYSTOLIC BLOOD PRESSURE: 150 MMHG

## 2024-05-14 DIAGNOSIS — T07.XXXA MULTIPLE CONTUSIONS: ICD-10-CM
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
## 2024-05-14 PROCEDURE — 72170 X-RAY EXAM OF PELVIS: CPT

## 2024-05-14 PROCEDURE — 73030 X-RAY EXAM OF SHOULDER: CPT | Mod: RT

## 2024-05-14 PROCEDURE — 73030 X-RAY EXAM OF SHOULDER: CPT | Mod: RIGHT SIDE | Performed by: RADIOLOGY

## 2024-05-14 PROCEDURE — 71045 X-RAY EXAM CHEST 1 VIEW: CPT

## 2024-05-14 PROCEDURE — 72100 X-RAY EXAM L-S SPINE 2/3 VWS: CPT

## 2024-05-14 PROCEDURE — 72100 X-RAY EXAM L-S SPINE 2/3 VWS: CPT | Performed by: RADIOLOGY

## 2024-05-14 PROCEDURE — 72050 X-RAY EXAM NECK SPINE 4/5VWS: CPT

## 2024-05-14 PROCEDURE — 99284 EMERGENCY DEPT VISIT MOD MDM: CPT | Mod: 25

## 2024-05-14 PROCEDURE — 71045 X-RAY EXAM CHEST 1 VIEW: CPT | Performed by: RADIOLOGY

## 2024-05-14 PROCEDURE — 2500000001 HC RX 250 WO HCPCS SELF ADMINISTERED DRUGS (ALT 637 FOR MEDICARE OP): Performed by: PHYSICIAN ASSISTANT

## 2024-05-14 PROCEDURE — 72170 X-RAY EXAM OF PELVIS: CPT | Performed by: RADIOLOGY

## 2024-05-14 PROCEDURE — 72050 X-RAY EXAM NECK SPINE 4/5VWS: CPT | Performed by: RADIOLOGY

## 2024-05-14 RX ORDER — HYDROCODONE BITARTRATE AND ACETAMINOPHEN 5; 325 MG/1; MG/1
1 TABLET ORAL ONCE
Status: COMPLETED | OUTPATIENT
Start: 2024-05-14 | End: 2024-05-14

## 2024-05-14 RX ADMIN — HYDROCODONE BITARTRATE AND ACETAMINOPHEN 1 TABLET: 5; 325 TABLET ORAL at 16:06

## 2024-05-14 ASSESSMENT — PAIN SCALES - GENERAL
PAINLEVEL_OUTOF10: 8
PAINLEVEL_OUTOF10: 6

## 2024-05-14 ASSESSMENT — PAIN - FUNCTIONAL ASSESSMENT
PAIN_FUNCTIONAL_ASSESSMENT: 0-10
PAIN_FUNCTIONAL_ASSESSMENT: 0-10

## 2024-05-14 ASSESSMENT — PAIN DESCRIPTION - ORIENTATION: ORIENTATION: RIGHT

## 2024-05-14 NOTE — ED PROVIDER NOTES
HPI   Chief Complaint   Patient presents with    Fall     Pt comes in for a fall. Pt states that he has a hx of balance issues due to a previous head injury and fell. Pt complains of right arm pain, right knee pain, left hip and right side pain. Pt denies any LOC or blood thinners. Pt states that when he falls he usually breaks something.        Patient presents for evaluation after fall.  States he has chronic gait abnormalities and cannot ambulate without assistance.  States he was ambulating today and was unable to stand completely with assistance and fell instead.  Injuring both shoulders.  He was able to stand and put weight on his legs after the fall.  Denies any rib or trunk injury.  Denies any head injury or loss of consciousness.  Denies any nauseous or vomiting.  States the pain is somewhat improved after the fall.      History provided by:  Patient                      [place] Coma Scale Score: 15                     Patient History   Past Medical History:   Diagnosis Date    Body mass index (BMI) 36.0-36.9, adult 03/04/2021    Body mass index (BMI) of 36.0 to 36.9 in adult    Impaired fasting glucose 03/04/2021    IFG (impaired fasting glucose)    Major depressive disorder, single episode, severe without psychotic features (Multi) 06/10/2021    Severe depression    Personal history of other diseases of the respiratory system 01/03/2022    History of acute sinusitis    Personal history of other specified conditions 02/08/2021    History of urinary urgency    Personal history of other specified conditions 06/10/2020    History of nocturia    Personal history of other specified conditions 02/08/2021    History of urinary frequency    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    Rib pain on right side    Unspecified fall, initial encounter 06/10/2021    Fall in home, initial encounter     Past Surgical History:   Procedure Laterality Date    CT ANGIO CORONARY ART WITH HEARTFLOW IF SCORE >30%  10/1/2022    CT ANGIO CORONARY ART  WITH HEARTFLOW IF SCORE >30% 10/1/2022    CT ANGIO NECK  12/4/2023    CT ANGIO NECK 12/4/2023    CT HEAD ANGIO W AND WO IV CONTRAST  10/1/2022    CT HEAD ANGIO W AND WO IV CONTRAST 10/1/2022    OTHER SURGICAL HISTORY  11/21/2019    Finger amputation    OTHER SURGICAL HISTORY  11/21/2019    Vasectomy    OTHER SURGICAL HISTORY  11/21/2019    Rotator cuff repair    OTHER SURGICAL HISTORY  11/21/2019    Surgery    OTHER SURGICAL HISTORY  11/21/2019    Knee replacement    OTHER SURGICAL HISTORY  09/14/2021    Prostate surgery    OTHER SURGICAL HISTORY  06/10/2021    Insertion of prostatic urethral lift implant    OTHER SURGICAL HISTORY  10/28/2022    Craniotomy    OTHER SURGICAL HISTORY  11/18/2015    Colonoscopy    REVERSE TOTAL SHOULDER ARTHROPLASTY Right 09/25/2023     Family History   Problem Relation Name Age of Onset    Diabetes Mother      COPD Mother      Hypertension Father      Heart attack Father      Diabetes Sister      Diabetes Other       Social History     Tobacco Use    Smoking status: Former     Types: Cigarettes    Smokeless tobacco: Never   Vaping Use    Vaping status: Never Used   Substance Use Topics    Alcohol use: Never    Drug use: Never       Physical Exam   ED Triage Vitals [05/14/24 1406]   Temperature Heart Rate Respirations BP   36.3 °C (97.3 °F) (!) 111 20 (!) 159/113      Pulse Ox Temp Source Heart Rate Source Patient Position   96 % Temporal Monitor Sitting      BP Location FiO2 (%)     Left arm --       Physical Exam  Vitals and nursing note reviewed.   Constitutional:       General: He is not in acute distress.     Appearance: Normal appearance. He is obese. He is not ill-appearing or toxic-appearing.   HENT:      Head: Normocephalic.      Right Ear: External ear normal.      Left Ear: External ear normal.      Nose: Nose normal.      Mouth/Throat:      Lips: Pink. No lesions.      Mouth: Mucous membranes are moist.      Pharynx: No oropharyngeal exudate.   Eyes:      General: No  scleral icterus.     Conjunctiva/sclera: Conjunctivae normal.   Cardiovascular:      Rate and Rhythm: Normal rate and regular rhythm.      Pulses:           Radial pulses are 2+ on the right side and 2+ on the left side.        Dorsalis pedis pulses are 2+ on the right side and 2+ on the left side.        Posterior tibial pulses are 2+ on the right side and 2+ on the left side.      Heart sounds: Normal heart sounds.   Pulmonary:      Effort: Pulmonary effort is normal.      Breath sounds: Normal breath sounds and air entry.   Chest:      Chest wall: No tenderness.   Abdominal:      General: Bowel sounds are normal. There is no distension.      Palpations: Abdomen is soft.      Tenderness: There is no abdominal tenderness. There is no right CVA tenderness, left CVA tenderness or guarding.   Musculoskeletal:         General: No tenderness.      Cervical back: No spinous process tenderness or muscular tenderness.      Comments: Palpating upper and lower extremities pathology areas of tenderness.  Range of motion is intact.  No midline axial spine tenderness on exam.  No step-off deformity.  No acute bruising noted   Skin:     General: Skin is warm.      Capillary Refill: Capillary refill takes less than 2 seconds.      Findings: No rash.   Neurological:      General: No focal deficit present.      Mental Status: He is alert and oriented to person, place, and time.      Cranial Nerves: No cranial nerve deficit.      Sensory: No sensory deficit.   Psychiatric:         Attention and Perception: Attention and perception normal.         Mood and Affect: Mood and affect normal.         Speech: Speech normal.         Behavior: Behavior normal. Behavior is cooperative.         Thought Content: Thought content normal.         Cognition and Memory: Cognition and memory normal.         Judgment: Judgment normal.         ED Course & MDM   Diagnoses as of 05/14/24 1752   Fall, initial encounter   Multiple contusions       Medical  Decision Making  Patient presents for evaluation after fall.  States he has chronic gait abnormalities and cannot ambulate without assistance.  States he was ambulating today and was unable to stand completely with assistance and fell instead.  Injuring both shoulders.  He was able to stand and put weight on his legs after the fall.  Denies any rib or trunk injury.  Denies any head injury or loss of consciousness.  Denies any nauseous or vomiting.  States the pain is somewhat improved after the fall.    Ddx: Fracture, contusion, strain, sprain, other    Will obtain basic films.  Patient's pain was treated with Norco while in the ED.  Radiologist read films showing no acute fracture.  Just prior to all the readings coming back the patient decided that he wanted to leave the hospital no matter what the radiologist read his x-ray says.  I was able to let him know that the reports did come back showing no acute fracture and he was happy to leave.  Patient discharged home in improved and stable condition to follow-up with primary care provider.    Amount and/or Complexity of Data Reviewed  Radiology: ordered and independent interpretation performed. Decision-making details documented in ED Course.    Risk  OTC drugs.  Diagnosis or treatment significantly limited by social determinants of health.        Procedure  Procedures     Nat Morrow PA-C  05/14/24 4790

## 2024-05-16 ENCOUNTER — TELEPHONE (OUTPATIENT)
Dept: PRIMARY CARE | Facility: CLINIC | Age: 64
End: 2024-05-16
Payer: COMMERCIAL

## 2024-05-16 NOTE — TELEPHONE ENCOUNTER
Patient called and said he fell this morning and wanted to know if ROS can put an order in for an xray without seeing him. He said he thinks he broke his back and didn't think there was a point to ROS seeing him.   I did put him on the schedule for tomorrow but told him I would check with him first and have someone call him and let him know.  Thank you

## 2024-05-17 ENCOUNTER — APPOINTMENT (OUTPATIENT)
Dept: PRIMARY CARE | Facility: CLINIC | Age: 64
End: 2024-05-17
Payer: COMMERCIAL

## 2024-05-21 ENCOUNTER — TELEPHONE (OUTPATIENT)
Dept: PRIMARY CARE | Facility: CLINIC | Age: 64
End: 2024-05-21
Payer: COMMERCIAL

## 2024-05-21 DIAGNOSIS — M47.12 CERVICAL ARTHRITIS WITH MYELOPATHY: ICD-10-CM

## 2024-05-21 RX ORDER — HYDROCODONE BITARTRATE AND ACETAMINOPHEN 10; 325 MG/1; MG/1
1 TABLET ORAL 3 TIMES DAILY PRN
Qty: 90 TABLET | Refills: 0 | Status: SHIPPED | OUTPATIENT
Start: 2024-05-21 | End: 2024-06-20

## 2024-05-23 ENCOUNTER — TELEPHONE (OUTPATIENT)
Dept: PRIMARY CARE | Facility: CLINIC | Age: 64
End: 2024-05-23
Payer: COMMERCIAL

## 2024-05-23 NOTE — TELEPHONE ENCOUNTER
Patient called and said that he went to [facility] for his back and they re did the xrays. He said he was told he has fractures.   He refused a follow up appointment but wanted to know if ROS will review the images.   Thank you

## 2024-06-05 ENCOUNTER — TELEPHONE (OUTPATIENT)
Dept: PRIMARY CARE | Facility: CLINIC | Age: 64
End: 2024-06-05
Payer: COMMERCIAL

## 2024-06-05 NOTE — TELEPHONE ENCOUNTER
JAIRO Cooper continues to fall, refused earlier appointment, just wanted to let you know of his current situation

## 2024-06-14 ENCOUNTER — TELEPHONE (OUTPATIENT)
Dept: PRIMARY CARE | Facility: CLINIC | Age: 64
End: 2024-06-14
Payer: COMMERCIAL

## 2024-06-14 NOTE — TELEPHONE ENCOUNTER
Patient son called in and would like a respite order for patient to go to a facility [facility] in [place] preferably

## 2024-06-17 ENCOUNTER — TELEPHONE (OUTPATIENT)
Dept: PRIMARY CARE | Facility: CLINIC | Age: 64
End: 2024-06-17
Payer: COMMERCIAL

## 2024-06-17 DIAGNOSIS — G89.4 CHRONIC PAIN SYNDROME: ICD-10-CM

## 2024-06-17 DIAGNOSIS — S09.90XS BALANCE DISTURBANCE DUE TO OLD HEAD INJURY: Primary | ICD-10-CM

## 2024-06-17 DIAGNOSIS — R26.89 BALANCE DISTURBANCE DUE TO OLD HEAD INJURY: Primary | ICD-10-CM

## 2024-06-17 NOTE — TELEPHONE ENCOUNTER
Had a message from Answering sefvice this morning from [facility] that for a respite stay for patient, they need a FTF, diagnosis, med list, demographics, level of care needed, signed order to admit Monday to Monday and PT order as well  Patient called as well and is very anxious to get this taken care of.    Fax to [phone]

## 2024-06-17 NOTE — TELEPHONE ENCOUNTER
Pc to patient and let him know that I faxed orders for respite stay and other information requested by Jimmy to them this afternoon, including a therapy order.

## 2024-06-19 ENCOUNTER — TELEPHONE (OUTPATIENT)
Dept: PRIMARY CARE | Facility: CLINIC | Age: 64
End: 2024-06-19
Payer: COMMERCIAL

## 2024-06-19 DIAGNOSIS — M47.12 CERVICAL ARTHRITIS WITH MYELOPATHY: ICD-10-CM

## 2024-06-20 ENCOUNTER — TELEPHONE (OUTPATIENT)
Dept: PRIMARY CARE | Facility: CLINIC | Age: 64
End: 2024-06-20
Payer: COMMERCIAL

## 2024-06-20 RX ORDER — HYDROCODONE BITARTRATE AND ACETAMINOPHEN 10; 325 MG/1; MG/1
1 TABLET ORAL 3 TIMES DAILY PRN
Qty: 90 TABLET | Refills: 0 | Status: SHIPPED | OUTPATIENT
Start: 2024-06-20 | End: 2024-07-20

## 2024-06-20 NOTE — TELEPHONE ENCOUNTER
Patient called in and would like to know when he can get his norco filled. He is insistant that he needs it before the weekend. [facility] [place]

## 2024-06-25 ENCOUNTER — APPOINTMENT (OUTPATIENT)
Dept: PHYSICAL THERAPY | Facility: CLINIC | Age: 64
End: 2024-06-25
Payer: COMMERCIAL

## 2024-07-02 ENCOUNTER — APPOINTMENT (OUTPATIENT)
Dept: PRIMARY CARE | Facility: CLINIC | Age: 64
End: 2024-07-02
Payer: COMMERCIAL

## 2024-07-08 ENCOUNTER — TELEPHONE (OUTPATIENT)
Dept: PRIMARY CARE | Facility: CLINIC | Age: 64
End: 2024-07-08
Payer: COMMERCIAL

## 2024-07-08 NOTE — TELEPHONE ENCOUNTER
Patient states he is in extreme arthritic pain in left hip. Pt states he take Norco 3x per day & it is not working to alleviate his discomfort. Please call to discuss

## 2024-07-10 ENCOUNTER — TELEPHONE (OUTPATIENT)
Dept: PRIMARY CARE | Facility: CLINIC | Age: 64
End: 2024-07-10
Payer: COMMERCIAL

## 2024-07-10 NOTE — TELEPHONE ENCOUNTER
Patient asked for a cortisone shot in hip, he wanted first available with either Benjy or Dr Meza. He has an appointment on 7/16/24 @ 3 with Dr. Meza. Is this alright, or do I need to change the appointment?

## 2024-07-15 ENCOUNTER — TELEPHONE (OUTPATIENT)
Dept: PRIMARY CARE | Facility: CLINIC | Age: 64
End: 2024-07-15

## 2024-07-15 ENCOUNTER — APPOINTMENT (OUTPATIENT)
Dept: PRIMARY CARE | Facility: CLINIC | Age: 64
End: 2024-07-15
Payer: COMMERCIAL

## 2024-07-15 VITALS
HEART RATE: 104 BPM | DIASTOLIC BLOOD PRESSURE: 82 MMHG | WEIGHT: 190 LBS | OXYGEN SATURATION: 93 % | BODY MASS INDEX: 30.67 KG/M2 | SYSTOLIC BLOOD PRESSURE: 128 MMHG

## 2024-07-15 DIAGNOSIS — G89.4 CHRONIC PAIN SYNDROME: ICD-10-CM

## 2024-07-15 DIAGNOSIS — H69.91 DYSFUNCTION OF RIGHT EUSTACHIAN TUBE: ICD-10-CM

## 2024-07-15 DIAGNOSIS — M16.0 PRIMARY OSTEOARTHRITIS OF BOTH HIPS: Primary | ICD-10-CM

## 2024-07-15 DIAGNOSIS — M16.0 PRIMARY OSTEOARTHRITIS OF BOTH HIPS: ICD-10-CM

## 2024-07-15 DIAGNOSIS — R29.6 FALLS FREQUENTLY: ICD-10-CM

## 2024-07-15 DIAGNOSIS — M47.12 CERVICAL ARTHRITIS WITH MYELOPATHY: Primary | ICD-10-CM

## 2024-07-15 DIAGNOSIS — Z98.890 HISTORY OF REVERSE TOTAL REPLACEMENT OF RIGHT SHOULDER JOINT: ICD-10-CM

## 2024-07-15 DIAGNOSIS — M47.12 CERVICAL ARTHRITIS WITH MYELOPATHY: ICD-10-CM

## 2024-07-15 PROBLEM — M75.41 IMPINGEMENT SYNDROME OF RIGHT SHOULDER: Status: RESOLVED | Noted: 2023-03-15 | Resolved: 2024-07-15

## 2024-07-15 PROBLEM — Z01.810 PRE-OPERATIVE CARDIOVASCULAR EXAMINATION: Status: RESOLVED | Noted: 2023-09-01 | Resolved: 2024-07-15

## 2024-07-15 PROCEDURE — 1036F TOBACCO NON-USER: CPT | Performed by: FAMILY MEDICINE

## 2024-07-15 PROCEDURE — 3008F BODY MASS INDEX DOCD: CPT | Performed by: FAMILY MEDICINE

## 2024-07-15 PROCEDURE — 1160F RVW MEDS BY RX/DR IN RCRD: CPT | Performed by: FAMILY MEDICINE

## 2024-07-15 PROCEDURE — 3048F LDL-C <100 MG/DL: CPT | Performed by: FAMILY MEDICINE

## 2024-07-15 PROCEDURE — 3051F HG A1C>EQUAL 7.0%<8.0%: CPT | Performed by: FAMILY MEDICINE

## 2024-07-15 PROCEDURE — 3079F DIAST BP 80-89 MM HG: CPT | Performed by: FAMILY MEDICINE

## 2024-07-15 PROCEDURE — 3062F POS MACROALBUMINURIA REV: CPT | Performed by: FAMILY MEDICINE

## 2024-07-15 PROCEDURE — 1159F MED LIST DOCD IN RCRD: CPT | Performed by: FAMILY MEDICINE

## 2024-07-15 PROCEDURE — 4010F ACE/ARB THERAPY RXD/TAKEN: CPT | Performed by: FAMILY MEDICINE

## 2024-07-15 PROCEDURE — 3074F SYST BP LT 130 MM HG: CPT | Performed by: FAMILY MEDICINE

## 2024-07-15 PROCEDURE — 99214 OFFICE O/P EST MOD 30 MIN: CPT | Performed by: FAMILY MEDICINE

## 2024-07-15 PROCEDURE — 1157F ADVNC CARE PLAN IN RCRD: CPT | Performed by: FAMILY MEDICINE

## 2024-07-15 RX ORDER — PREDNISONE 50 MG/1
50 TABLET ORAL DAILY
COMMUNITY
Start: 2024-07-08

## 2024-07-15 NOTE — TELEPHONE ENCOUNTER
Patient called in and would like to have a referral to the [facility]. He is having a hard time getting a hold of [facility].    Thank you

## 2024-07-15 NOTE — PROGRESS NOTES
Subjective   Patient ID: Kenneth Valenzuela is a 64 y.o. male who presents for Follow-up (ER follow up and wants left hip injection).    HPI Was in the ER in July 8. He was so sore in the hip that he could not bear. So had a fall. In the fall he fell on the right hip. Both are sore. Also on right shoulder. That has good ROM. No pain. The neck is stiff as usual. CT spine with Moderate cervical spondylosis. CT of head post craniotomy and only infarction.  Hips with OA severe on left and mild on right. Shoulder Xray shows old fractures of right ribs (says that hurt if he leans on that area). Right RSR intact. Left shoulder arthritis. The fractures were from  a prior fall.     Has sugars that are not checked often. No meds.. A1C in March of 7.0%     He is on Norco for pain tid.  Constant pain level of 4. Will shoot to 10 with activity. Pain in the neck and low back and joints. Frequent falls aggravate.      Has plugging of right ear and feels it is full of wax.       I have personally reviewed the patients OARRS report. This report is filed in the EHR. I have considered the risks of abuse, addiction and diversion. I believe it is clinically appropriate to continue to prescribe this medication.      CSA 2/6/24  UDS 3/4/21 as expected for medication profile 3/7/22 as expected for medication profile 2/28/23 as expected for medication profile 2/6/24 as expected for medication profile   PSA 12/1/22  Colonoscopy 11/18/15        Review of Systems    Objective   /82 (BP Location: Left arm, Patient Position: Sitting)   Pulse 104   Wt 86.2 kg (190 lb)   SpO2 93%   BMI 30.67 kg/m²     Physical Exam  Constitutional:       Appearance: Normal appearance. He is obese.   HENT:      Right Ear: Tympanic membrane, ear canal and external ear normal.   Cardiovascular:      Rate and Rhythm: Normal rate and regular rhythm.   Pulmonary:      Effort: Pulmonary effort is normal.      Breath sounds: Normal breath sounds.   Musculoskeletal:       Comments: The hips have marked decreased ROM with IR and ER bilaterally. Severe pain with motion on the left. No tenderness of the trochanters.   Able to abduct the shoulder on right     Skin:     General: Skin is warm and dry.   Neurological:      General: No focal deficit present.      Mental Status: He is alert and oriented to person, place, and time.   Psychiatric:         Mood and Affect: Mood normal.         Behavior: Behavior normal.         Thought Content: Thought content normal.         Judgment: Judgment normal.         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    Unfortunately, neither the ear or hip can be fixed today.

## 2024-07-15 NOTE — PATIENT INSTRUCTIONS
Call Dr Nicole about your hip arthritis. He may be able to inject that. The Xrays show severe arthritis on the left   [phone]

## 2024-07-16 ENCOUNTER — APPOINTMENT (OUTPATIENT)
Dept: PRIMARY CARE | Facility: CLINIC | Age: 64
End: 2024-07-16
Payer: COMMERCIAL

## 2024-07-16 ENCOUNTER — TELEPHONE (OUTPATIENT)
Dept: PRIMARY CARE | Facility: CLINIC | Age: 64
End: 2024-07-16

## 2024-07-16 DIAGNOSIS — M47.12 CERVICAL ARTHRITIS WITH MYELOPATHY: ICD-10-CM

## 2024-07-16 RX ORDER — HYDROCODONE BITARTRATE AND ACETAMINOPHEN 10; 325 MG/1; MG/1
1 TABLET ORAL 3 TIMES DAILY PRN
Qty: 90 TABLET | Refills: 0 | Status: SHIPPED | OUTPATIENT
Start: 2024-07-16 | End: 2024-07-21 | Stop reason: SDUPTHER

## 2024-07-16 NOTE — TELEPHONE ENCOUNTER
Please send refill to  Pharmacy    [id number] - [place], [place] - [address]  [address] [facility] [address]  Phone: [phone]  Fax: [phone]     HYDROcodone-acetaminophen (Norco)  mg tablet

## 2024-07-19 ENCOUNTER — TELEPHONE (OUTPATIENT)
Dept: PRIMARY CARE | Facility: CLINIC | Age: 64
End: 2024-07-19
Payer: COMMERCIAL

## 2024-07-19 NOTE — TELEPHONE ENCOUNTER
Patient called in and needs a refill of the following medication.... HYDROcodone-acetaminophen (Norco)  mg tablet patient will run out of the medication on Sunday..[facility] [place]   negative...

## 2024-07-19 NOTE — TELEPHONE ENCOUNTER
Patient notified that this Rx was sent in on 7/16/24.  I called [facility] and they have it on file and will get it ready to fill for Sunday.

## 2024-07-19 NOTE — TELEPHONE ENCOUNTER
HYDROcodone-acetaminophen (Norco)  mg tablet [[id number]]    Order Details    Dose: 1 tablet Route: oral Frequency: 3 times daily PRN for severe pain (7 - 10)   Dispense Quantity: 90 tablet Refills: 0    Note to Pharmacy: Ok to fill 1/19/24         Sig: Take 1 tablet by mouth 3 times a day as needed for severe pain (7 - 10).         [facility]ANK [facility].

## 2024-07-21 DIAGNOSIS — M47.12 CERVICAL ARTHRITIS WITH MYELOPATHY: ICD-10-CM

## 2024-07-21 RX ORDER — HYDROCODONE BITARTRATE AND ACETAMINOPHEN 10; 325 MG/1; MG/1
1 TABLET ORAL 3 TIMES DAILY PRN
Qty: 90 TABLET | Refills: 0 | Status: SHIPPED | OUTPATIENT
Start: 2024-07-21 | End: 2024-08-20

## 2024-08-13 ENCOUNTER — TELEPHONE (OUTPATIENT)
Dept: PRIMARY CARE | Facility: CLINIC | Age: 64
End: 2024-08-13
Payer: COMMERCIAL

## 2024-08-13 NOTE — TELEPHONE ENCOUNTER
Pt would like to know if he runs out of [place] could he take 1 Ibuprofen & 1 tylenol? & would also stop taking his meloxicam. Please call to discuss

## 2024-08-16 ENCOUNTER — TELEPHONE (OUTPATIENT)
Dept: PRIMARY CARE | Facility: CLINIC | Age: 64
End: 2024-08-16
Payer: COMMERCIAL

## 2024-08-16 DIAGNOSIS — M47.12 CERVICAL ARTHRITIS WITH MYELOPATHY: ICD-10-CM

## 2024-08-16 NOTE — TELEPHONE ENCOUNTER
HYDROcodone-acetaminophen (Norco)  mg tablet [[id number]]    Order Details  Dose: 1 tablet Route: oral Frequency: 3 times daily PRN for severe pain (7 - 10)   Dispense Quantity: 90 tablet Refills: 0          Sig: Take 1 tablet by mouth 3 times a day as needed for severe pain (7 - 10).   QUEtiapine (SEROquel) 25 mg tablet [[id number]]    Order Details  Dose: 25 mg Route: oral Frequency: 3 times daily   Dispense Quantity: 90 tablet Refills: 11          Sig: Take 1 tablet (25 mg) by mouth 3 times a day.   [facility]ANK [facility].

## 2024-08-16 NOTE — TELEPHONE ENCOUNTER
Pt due for norco 8-20-24 proposed to ROS Sachi has refills until Feb 2025 -I will call pt to let him know

## 2024-08-16 NOTE — TELEPHONE ENCOUNTER
I spoke to pt - he will check with [facility] for the Seroquel refill and voiced understanding refill for norco will be sent Monday

## 2024-08-17 RX ORDER — HYDROCODONE BITARTRATE AND ACETAMINOPHEN 10; 325 MG/1; MG/1
1 TABLET ORAL 3 TIMES DAILY PRN
Qty: 90 TABLET | Refills: 0 | Status: SHIPPED | OUTPATIENT
Start: 2024-08-17 | End: 2024-09-16

## 2024-08-20 ENCOUNTER — TELEPHONE (OUTPATIENT)
Dept: PRIMARY CARE | Facility: CLINIC | Age: 64
End: 2024-08-20
Payer: COMMERCIAL

## 2024-08-20 DIAGNOSIS — H60.90 OTITIS EXTERNA, UNSPECIFIED CHRONICITY, UNSPECIFIED LATERALITY, UNSPECIFIED TYPE: Primary | ICD-10-CM

## 2024-08-20 RX ORDER — NEOMYCIN SULFATE, POLYMYXIN B SULFATE, HYDROCORTISONE 3.5; 10000; 1 MG/ML; [USP'U]/ML; MG/ML
3 SOLUTION/ DROPS AURICULAR (OTIC) 3 TIMES DAILY
Qty: 10 ML | Refills: 2 | Status: SHIPPED | OUTPATIENT
Start: 2024-08-20

## 2024-08-21 ENCOUNTER — TELEPHONE (OUTPATIENT)
Dept: PRIMARY CARE | Facility: CLINIC | Age: 64
End: 2024-08-21
Payer: COMMERCIAL

## 2024-08-21 NOTE — TELEPHONE ENCOUNTER
Patient is experiencing congestion in R gestation tube, please call in an antibiotic or something you feel would work better  Pharmacy    [id number] - [place], [place] - [address]  [address] [facility] [address]  Phone: [phone]  Fax: [phone]

## 2024-09-17 ENCOUNTER — APPOINTMENT (OUTPATIENT)
Dept: PRIMARY CARE | Facility: CLINIC | Age: 64
End: 2024-09-17
Payer: COMMERCIAL

## 2024-09-17 ENCOUNTER — LAB (OUTPATIENT)
Dept: LAB | Facility: LAB | Age: 64
End: 2024-09-17
Payer: COMMERCIAL

## 2024-09-17 VITALS
BODY MASS INDEX: 30.99 KG/M2 | DIASTOLIC BLOOD PRESSURE: 76 MMHG | SYSTOLIC BLOOD PRESSURE: 124 MMHG | HEART RATE: 114 BPM | OXYGEN SATURATION: 92 % | WEIGHT: 192 LBS

## 2024-09-17 DIAGNOSIS — R29.6 FALLS FREQUENTLY: ICD-10-CM

## 2024-09-17 DIAGNOSIS — J30.9 ALLERGIC RHINITIS, UNSPECIFIED SEASONALITY, UNSPECIFIED TRIGGER: ICD-10-CM

## 2024-09-17 DIAGNOSIS — R45.4 BEHAVIOR-IRRITABILITY: ICD-10-CM

## 2024-09-17 DIAGNOSIS — E78.2 MIXED HYPERLIPIDEMIA: ICD-10-CM

## 2024-09-17 DIAGNOSIS — E11.42 DIABETIC POLYNEUROPATHY ASSOCIATED WITH TYPE 2 DIABETES MELLITUS (MULTI): ICD-10-CM

## 2024-09-17 DIAGNOSIS — R56.9 SEIZURE (MULTI): ICD-10-CM

## 2024-09-17 DIAGNOSIS — R35.1 BENIGN PROSTATIC HYPERPLASIA WITH NOCTURIA: ICD-10-CM

## 2024-09-17 DIAGNOSIS — S06.9X0S: ICD-10-CM

## 2024-09-17 DIAGNOSIS — S06.9X0S COGNITIVE DEFICIT AS LATE EFFECT OF TRAUMATIC BRAIN INJURY: ICD-10-CM

## 2024-09-17 DIAGNOSIS — E11.9 CONTROLLED TYPE 2 DIABETES MELLITUS WITHOUT COMPLICATION, WITHOUT LONG-TERM CURRENT USE OF INSULIN (MULTI): ICD-10-CM

## 2024-09-17 DIAGNOSIS — E66.09 CLASS 1 OBESITY DUE TO EXCESS CALORIES WITH SERIOUS COMORBIDITY AND BODY MASS INDEX (BMI) OF 30.0 TO 30.9 IN ADULT: ICD-10-CM

## 2024-09-17 DIAGNOSIS — Z98.890 HISTORY OF REVERSE TOTAL REPLACEMENT OF RIGHT SHOULDER JOINT: ICD-10-CM

## 2024-09-17 DIAGNOSIS — E83.52 HYPERCALCEMIA: ICD-10-CM

## 2024-09-17 DIAGNOSIS — Z00.00 ROUTINE GENERAL MEDICAL EXAMINATION AT HEALTH CARE FACILITY: Primary | ICD-10-CM

## 2024-09-17 DIAGNOSIS — F32.1 DEPRESSION, MAJOR, SINGLE EPISODE, MODERATE (MULTI): ICD-10-CM

## 2024-09-17 DIAGNOSIS — S06.9X5A: ICD-10-CM

## 2024-09-17 DIAGNOSIS — N40.1 BENIGN PROSTATIC HYPERPLASIA WITH NOCTURIA: ICD-10-CM

## 2024-09-17 DIAGNOSIS — I10 PRIMARY HYPERTENSION: ICD-10-CM

## 2024-09-17 DIAGNOSIS — M75.42 IMPINGEMENT SYNDROME OF LEFT SHOULDER: ICD-10-CM

## 2024-09-17 DIAGNOSIS — M47.12 CERVICAL ARTHRITIS WITH MYELOPATHY: ICD-10-CM

## 2024-09-17 DIAGNOSIS — G89.4 CHRONIC PAIN SYNDROME: ICD-10-CM

## 2024-09-17 DIAGNOSIS — R46.89: ICD-10-CM

## 2024-09-17 DIAGNOSIS — M25.552 HIP PAIN, LEFT: ICD-10-CM

## 2024-09-17 DIAGNOSIS — F06.8 COGNITIVE DEFICIT AS LATE EFFECT OF TRAUMATIC BRAIN INJURY: ICD-10-CM

## 2024-09-17 DIAGNOSIS — R27.0 ATAXIA: ICD-10-CM

## 2024-09-17 DIAGNOSIS — S06.5XAA SUBDURAL HEMATOMA (MULTI): ICD-10-CM

## 2024-09-17 DIAGNOSIS — H60.90 OTITIS EXTERNA, UNSPECIFIED CHRONICITY, UNSPECIFIED LATERALITY, UNSPECIFIED TYPE: ICD-10-CM

## 2024-09-17 LAB
ALBUMIN SERPL BCP-MCNC: 5 G/DL (ref 3.4–5)
ALP SERPL-CCNC: 82 U/L (ref 33–136)
ALT SERPL W P-5'-P-CCNC: 26 U/L (ref 10–52)
ANION GAP SERPL CALC-SCNC: 19 MMOL/L (ref 10–20)
AST SERPL W P-5'-P-CCNC: 23 U/L (ref 9–39)
BILIRUB SERPL-MCNC: 0.7 MG/DL (ref 0–1.2)
BUN SERPL-MCNC: 25 MG/DL (ref 6–23)
CALCIUM SERPL-MCNC: 10.7 MG/DL (ref 8.6–10.3)
CHLORIDE SERPL-SCNC: 100 MMOL/L (ref 98–107)
CO2 SERPL-SCNC: 22 MMOL/L (ref 21–32)
CREAT SERPL-MCNC: 1.11 MG/DL (ref 0.5–1.3)
EGFRCR SERPLBLD CKD-EPI 2021: 74 ML/MIN/1.73M*2
ERYTHROCYTE [DISTWIDTH] IN BLOOD BY AUTOMATED COUNT: 13.7 % (ref 11.5–14.5)
GLUCOSE SERPL-MCNC: 372 MG/DL (ref 74–99)
HCT VFR BLD AUTO: 45.7 % (ref 41–52)
HGB BLD-MCNC: 14.1 G/DL (ref 13.5–17.5)
MCH RBC QN AUTO: 29.8 PG (ref 26–34)
MCHC RBC AUTO-ENTMCNC: 30.9 G/DL (ref 32–36)
MCV RBC AUTO: 97 FL (ref 80–100)
NRBC BLD-RTO: 0 /100 WBCS (ref 0–0)
PLATELET # BLD AUTO: 272 X10*3/UL (ref 150–450)
POTASSIUM SERPL-SCNC: 4.6 MMOL/L (ref 3.5–5.3)
PROT SERPL-MCNC: 7.2 G/DL (ref 6.4–8.2)
RBC # BLD AUTO: 4.73 X10*6/UL (ref 4.5–5.9)
SODIUM SERPL-SCNC: 136 MMOL/L (ref 136–145)
WBC # BLD AUTO: 10.1 X10*3/UL (ref 4.4–11.3)

## 2024-09-17 PROCEDURE — 3078F DIAST BP <80 MM HG: CPT | Performed by: FAMILY MEDICINE

## 2024-09-17 PROCEDURE — 4010F ACE/ARB THERAPY RXD/TAKEN: CPT | Performed by: FAMILY MEDICINE

## 2024-09-17 PROCEDURE — 80053 COMPREHEN METABOLIC PANEL: CPT

## 2024-09-17 PROCEDURE — 85027 COMPLETE CBC AUTOMATED: CPT

## 2024-09-17 PROCEDURE — G0402 INITIAL PREVENTIVE EXAM: HCPCS | Performed by: FAMILY MEDICINE

## 2024-09-17 PROCEDURE — 3051F HG A1C>EQUAL 7.0%<8.0%: CPT | Performed by: FAMILY MEDICINE

## 2024-09-17 PROCEDURE — 3048F LDL-C <100 MG/DL: CPT | Performed by: FAMILY MEDICINE

## 2024-09-17 PROCEDURE — 1170F FXNL STATUS ASSESSED: CPT | Performed by: FAMILY MEDICINE

## 2024-09-17 PROCEDURE — 3062F POS MACROALBUMINURIA REV: CPT | Performed by: FAMILY MEDICINE

## 2024-09-17 PROCEDURE — 1159F MED LIST DOCD IN RCRD: CPT | Performed by: FAMILY MEDICINE

## 2024-09-17 PROCEDURE — 83036 HEMOGLOBIN GLYCOSYLATED A1C: CPT

## 2024-09-17 PROCEDURE — 1158F ADVNC CARE PLAN TLK DOCD: CPT | Performed by: FAMILY MEDICINE

## 2024-09-17 PROCEDURE — 3074F SYST BP LT 130 MM HG: CPT | Performed by: FAMILY MEDICINE

## 2024-09-17 PROCEDURE — 1036F TOBACCO NON-USER: CPT | Performed by: FAMILY MEDICINE

## 2024-09-17 PROCEDURE — 1123F ACP DISCUSS/DSCN MKR DOCD: CPT | Performed by: FAMILY MEDICINE

## 2024-09-17 PROCEDURE — 1157F ADVNC CARE PLAN IN RCRD: CPT | Performed by: FAMILY MEDICINE

## 2024-09-17 PROCEDURE — 1160F RVW MEDS BY RX/DR IN RCRD: CPT | Performed by: FAMILY MEDICINE

## 2024-09-17 PROCEDURE — 99215 OFFICE O/P EST HI 40 MIN: CPT | Performed by: FAMILY MEDICINE

## 2024-09-17 PROCEDURE — 36415 COLL VENOUS BLD VENIPUNCTURE: CPT

## 2024-09-17 RX ORDER — NEOMYCIN SULFATE, POLYMYXIN B SULFATE, HYDROCORTISONE 3.5; 10000; 1 MG/ML; [USP'U]/ML; MG/ML
3 SOLUTION/ DROPS AURICULAR (OTIC) 3 TIMES DAILY PRN
Qty: 10 ML | Refills: 2 | Status: SHIPPED | OUTPATIENT
Start: 2024-09-17

## 2024-09-17 RX ORDER — HYDROCODONE BITARTRATE AND ACETAMINOPHEN 10; 325 MG/1; MG/1
1 TABLET ORAL 3 TIMES DAILY PRN
Qty: 90 TABLET | Refills: 0 | Status: SHIPPED | OUTPATIENT
Start: 2024-09-17 | End: 2024-10-17

## 2024-09-17 ASSESSMENT — PATIENT HEALTH QUESTIONNAIRE - PHQ9
SUM OF ALL RESPONSES TO PHQ9 QUESTIONS 1 AND 2: 1
2. FEELING DOWN, DEPRESSED OR HOPELESS: SEVERAL DAYS
1. LITTLE INTEREST OR PLEASURE IN DOING THINGS: NOT AT ALL
SUM OF ALL RESPONSES TO PHQ9 QUESTIONS 1 AND 2: 2
2. FEELING DOWN, DEPRESSED OR HOPELESS: MORE THAN HALF THE DAYS
1. LITTLE INTEREST OR PLEASURE IN DOING THINGS: NOT AT ALL

## 2024-09-17 ASSESSMENT — ACTIVITIES OF DAILY LIVING (ADL)
DRESSING: NEEDS ASSISTANCE
DOING_HOUSEWORK: NEEDS ASSISTANCE
MANAGING_FINANCES: INDEPENDENT
TAKING_MEDICATION: INDEPENDENT
GROCERY_SHOPPING: NEEDS ASSISTANCE
BATHING: NEEDS ASSISTANCE

## 2024-09-17 NOTE — ASSESSMENT & PLAN NOTE
Orders:    Follow Up In Primary Care - Established; Future    CBC; Future    Comprehensive Metabolic Panel; Future    Hemoglobin A1C; Future    Follow Up In Primary Care - Established

## 2024-09-17 NOTE — ASSESSMENT & PLAN NOTE
Orders:    Follow Up In Primary Care - Established    HYDROcodone-acetaminophen (Norco)  mg tablet; Take 1 tablet by mouth 3 times a day as needed for severe pain (7 - 10).

## 2024-09-17 NOTE — PROGRESS NOTES
Subjective   Reason for Visit: Kenneth Valenzuela is an 64 y.o. male here for a Medicare Wellness visit.     Past Medical, Surgical, and Family History reviewed and updated in chart.    Reviewed all medications by prescribing practitioner or clinical pharmacist (such as prescriptions, OTCs, herbal therapies and supplements) and documented in the medical record.    HPI  Allergic rhinitis - has been on INS and helps. A lot of mucus in AM from nose. No longer on supplement Balance of Nature. But on vitamins.       BPH - had a Urolift procedure by Dr Waddell on September 3, 2020. Did help for awhile but no longer. Able to retain fluid with nocturia X 2-3. No burning. Flow was good and larger amounts but now diminishing.  Would like to see urology for this.  No medications.      Cervical spondylosis with myelopathy and radiculopathy  and shoulder pain- hard to tell if myelopathy causing his imbalance or if due to TBI. Using Norco pretty regularly as pain seems to be more daily intolerable.  Pain levels 3-10.  Med drops it by 5 for 4-5 hours.  Able to walk better.  The left hip is quite painful also (see below)  Has had injection in left shoulder by Dr Nicole yesterday and doing well.  Did have RSR on right and that is fine.  To have Left at some point.   He is on 3 in a day of the [place] 5.   Home traction unit helps the back when needed. Mobic helps some as he noted a lot worse when off of it.  Would like to do more in yard but balance and pain limit. Does mow lawn on riding mower.  Would like to run vacuum.      Closed traumatic brain injury - falls as balance is getting worse since he had the seizures in 2018. Aricept for the memory did not help. Has tried CBD gummies and says they help balance. But aware he cannot get Norco and stay on those. Dr Perez did surgery on the brain for SDH.  He is having more spasticity. Dropping silverware. Pain in hands with writing.  Dr Machado did EMG showing CTS.  Does not feel PT helps.       Depression and anxiety - He is on Seroquel 25 at tid.  Dries out mouth but not as bad as Risperdal. Keeps working on not raising his voice and Cymbalta 30 and 60. Dr Smith managed TBI but no longer. Seems not as good with the pain up. Still having bizarre thoughts.      Hyperlipidemia -  On Trilipix and still has high TG at 232 in March but down from upper 300s before that.      Hypertension - meds work well without side effects. Weight is down. Apple Cider vinegar helps . No Chest pain, Dyspnea, palpitations, numbness, weakness, edema, claudications, or new double vision/ loss of vision.      Diabetes  - A1C of 7.0 in  March down from 7.6.  No meds. ast summer and has done well with diet.   Home glucose rarely checked.  Low to mid 100s. GFR over 90     Male erectile disorder - Viagra works well. Wife is not interested.      MIGRAINE - none since he had the SDH surgery.   Sumatriptan did work well but not needed.  Aura is a stiff neck.      Obesity -  BMI 30.  Diet with lesser portions and more greens not as much now. Did lose and now stable.      TKR Left. The left knee will pop if he kneels. Limits kneeling. Feels more sloppy in the patella.      Primary osteoarthritis of both hips - was better on the left with injection of Kenalog in bursa. But has been in ER twice in July.. To Dr Nicole. Did injection of hip and that is not helping yet but just yesterday.  In Bursa.     Seizure - Last seizure in October 2022 with admission. Had gone off of seizure meds. So now back on Keppra. Doing well.  No driving per Dr Perez.  Had subdural from fall and that has stabilized.     Falls daily and multiple times a day. He calls the squad to get him up.  He has not looked assisted living.  He says that the [employer] personel do not mind doing this but they called with concerns about this.  Rehab would not be helpful. Discussed Assisted Living and benefits. He is not happy about it but wife in room and both willing to investigate    I spoke with Captain Re[employer] at the [employer]. They are going out there 10-15 times per week. I told him that Kenneth assured me that they were OK with this.  And he had even spoken to the Mayor. I encouraged them to reinforce the benefits of assisted living and that I would reach out to son Pavel who is a HIPAA contact to update him.       Snores and talks in sleep but no gasping      I have personally reviewed the patients OARRS report. This report is filed in the EHR. I have considered the risks of abuse, addiction and diversion. I believe it is clinically appropriate to continue to prescribe this medication.      CSA 2/6/24  UDS 3/4/21 as expected for medication profile 3/7/22 as expected for medication profile 2/28/23 as expected for medication profile 2/6/24 as expected for medication profile   PSA 12/1/22  Colonoscopy 11/18/15  LW and DPA yes wife and children     Patient Care Team:  Nils Burleson MD as PCP - General  Nils Burleson MD as PCP - [facility] [employer] PCP     Review of Systems    Objective   Vitals:  /76 (BP Location: Right arm, Patient Position: Sitting)   Pulse (!) 114   Wt 87.1 kg (192 lb)   SpO2 92%   BMI 30.99 kg/m²       Physical Exam  Vitals reviewed.   Constitutional:       General: He is not in acute distress.     Appearance: Normal appearance. He is obese.   HENT:      Head: Normocephalic.      Right Ear: Tympanic membrane, ear canal and external ear normal.      Left Ear: Tympanic membrane, ear canal and external ear normal.      Nose: Nose normal.      Mouth/Throat:      Mouth: Mucous membranes are moist.      Pharynx: Oropharynx is clear.   Eyes:      Extraocular Movements: Extraocular movements intact.      Conjunctiva/sclera: Conjunctivae normal.      Pupils: Pupils are equal, round, and reactive to light.   Neck:      Vascular: No carotid bruit.   Cardiovascular:      Rate and Rhythm: Normal rate and regular rhythm.      Pulses: Normal pulses.       Heart sounds: Normal heart sounds. No murmur heard.  Pulmonary:      Effort: Pulmonary effort is normal. No respiratory distress.      Breath sounds: Normal breath sounds.   Abdominal:      General: Abdomen is flat. Bowel sounds are normal. There is no distension.      Palpations: Abdomen is soft. There is no mass.      Tenderness: There is no abdominal tenderness.   Musculoskeletal:      Cervical back: Normal range of motion and neck supple. No tenderness.   Lymphadenopathy:      Cervical: No cervical adenopathy.   Skin:     General: Skin is warm and dry.      Findings: No rash.   Neurological:      General: No focal deficit present.      Mental Status: He is alert and oriented to person, place, and time.      Motor: No weakness.      Coordination: Coordination abnormal.      Gait: Gait abnormal.      Comments: Finger to nose WNL but sitting in wheelchair and has imbalance when standing    Psychiatric:         Mood and Affect: Mood normal.         Thought Content: Thought content normal.         Judgment: Judgment normal.         Assessment & Plan  Cervical arthritis with myelopathy    Orders:    Follow Up In Primary Care - Established    HYDROcodone-acetaminophen (Norco)  mg tablet; Take 1 tablet by mouth 3 times a day as needed for severe pain (7 - 10).    Benign prostatic hyperplasia with nocturia    Orders:    Referral to Urology; Future    Allergic rhinitis, unspecified seasonality, unspecified trigger         Controlled type 2 diabetes mellitus without complication, without long-term current use of insulin (Multi)    Orders:    Follow Up In Primary Care - Established; Future    CBC; Future    Comprehensive Metabolic Panel; Future    Hemoglobin A1C; Future    Follow Up In Primary Care - Established    Ataxia         Behavior-irritability         Chronic pain syndrome         Class 1 obesity due to excess calories with serious comorbidity and body mass index (BMI) of 30.0 to 30.9 in adult          Cognitive deficit as late effect of traumatic brain injury (CMS-[facility])         Depression, major, single episode, moderate (Multi)         Diabetic polyneuropathy associated with type 2 diabetes mellitus (Multi)         Difficulty controlling behavior as late effect traumatic brain injury (CMS-[facility])         Falls frequently         Hip pain, left         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

## 2024-09-18 LAB
EST. AVERAGE GLUCOSE BLD GHB EST-MCNC: 197 MG/DL
HBA1C MFR BLD: 8.5 %

## 2024-09-19 ENCOUNTER — TELEPHONE (OUTPATIENT)
Dept: PRIMARY CARE | Facility: CLINIC | Age: 64
End: 2024-09-19
Payer: COMMERCIAL

## 2024-09-19 DIAGNOSIS — E11.9 CONTROLLED TYPE 2 DIABETES MELLITUS WITHOUT COMPLICATION, WITHOUT LONG-TERM CURRENT USE OF INSULIN (MULTI): Primary | ICD-10-CM

## 2024-09-19 DIAGNOSIS — E83.52 HYPERCALCEMIA: ICD-10-CM

## 2024-09-19 NOTE — TELEPHONE ENCOUNTER
Asking for a call back from Dr. Burleson. The squad was at his house and had to pick him up off the floor. [phone]

## 2024-09-19 NOTE — RESULT ENCOUNTER NOTE
Sugar high as expected.  A1C is higher than last time so average sugar is higher but now unexpected with all that is going on.  The Calcium is a little high but unchanged. Should check that again in 3 months

## 2024-09-30 ENCOUNTER — HOSPITAL ENCOUNTER (EMERGENCY)
Facility: HOSPITAL | Age: 64
Discharge: HOME | End: 2024-09-30
Attending: EMERGENCY MEDICINE
Payer: COMMERCIAL

## 2024-09-30 VITALS
BODY MASS INDEX: 30.99 KG/M2 | DIASTOLIC BLOOD PRESSURE: 86 MMHG | RESPIRATION RATE: 16 BRPM | OXYGEN SATURATION: 94 % | TEMPERATURE: 97.5 F | SYSTOLIC BLOOD PRESSURE: 134 MMHG | WEIGHT: 192 LBS | HEART RATE: 100 BPM

## 2024-09-30 DIAGNOSIS — H60.501 ACUTE OTITIS EXTERNA OF RIGHT EAR, UNSPECIFIED TYPE: Primary | ICD-10-CM

## 2024-09-30 PROCEDURE — 99283 EMERGENCY DEPT VISIT LOW MDM: CPT

## 2024-09-30 PROCEDURE — 2500000001 HC RX 250 WO HCPCS SELF ADMINISTERED DRUGS (ALT 637 FOR MEDICARE OP): Performed by: EMERGENCY MEDICINE

## 2024-09-30 RX ORDER — CIPROFLOXACIN AND DEXAMETHASONE 3; 1 MG/ML; MG/ML
4 SUSPENSION/ DROPS AURICULAR (OTIC) 2 TIMES DAILY
Qty: 7.5 ML | Refills: 0 | Status: SHIPPED | OUTPATIENT
Start: 2024-09-30

## 2024-09-30 RX ORDER — CEPHALEXIN 500 MG/1
500 CAPSULE ORAL 4 TIMES DAILY
Qty: 40 CAPSULE | Refills: 0 | Status: SHIPPED | OUTPATIENT
Start: 2024-09-30 | End: 2024-10-10

## 2024-09-30 RX ORDER — CEPHALEXIN 500 MG/1
500 CAPSULE ORAL ONCE
Status: COMPLETED | OUTPATIENT
Start: 2024-09-30 | End: 2024-09-30

## 2024-09-30 ASSESSMENT — COLUMBIA-SUICIDE SEVERITY RATING SCALE - C-SSRS
1. IN THE PAST MONTH, HAVE YOU WISHED YOU WERE DEAD OR WISHED YOU COULD GO TO SLEEP AND NOT WAKE UP?: NO
6. HAVE YOU EVER DONE ANYTHING, STARTED TO DO ANYTHING, OR PREPARED TO DO ANYTHING TO END YOUR LIFE?: NO
2. HAVE YOU ACTUALLY HAD ANY THOUGHTS OF KILLING YOURSELF?: NO

## 2024-09-30 NOTE — ED PROVIDER NOTES
HPI   Chief Complaint   Patient presents with    Ear Fullness     C/o ear fullness x 3 weeks.        64-year-old male presents with pain in right ear.  Patient has been using some Cortisporin drops with no improvement.  Patient states symptoms have worsened over the last several days.  Patient will be treated and referred back to his family medical doctor.  Denies any fever, chills or headache.      History provided by:  Patient          Patient History   Past Medical History:   Diagnosis Date    Body mass index (BMI) 36.0-36.9, adult 03/04/2021    Body mass index (BMI) of 36.0 to 36.9 in adult    Impaired fasting glucose 03/04/2021    IFG (impaired fasting glucose)    Major depressive disorder, single episode, severe without psychotic features (Multi) 06/10/2021    Severe depression    Personal history of other diseases of the respiratory system 01/03/2022    History of acute sinusitis    Personal history of other specified conditions 02/08/2021    History of urinary urgency    Personal history of other specified conditions 06/10/2020    History of nocturia    Personal history of other specified conditions 02/08/2021    History of urinary frequency    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    Rib pain on right side    Unspecified fall, initial encounter 06/10/2021    Fall in home, initial encounter     Past Surgical History:   Procedure Laterality Date    CT ANGIO CORONARY ART WITH HEARTFLOW IF SCORE >30%  10/1/2022    CT ANGIO CORONARY ART WITH HEARTFLOW IF SCORE >30% 10/1/2022    CT ANGIO NECK  12/4/2023    CT ANGIO NECK 12/4/2023    CT HEAD ANGIO W AND WO IV CONTRAST  10/1/2022    CT HEAD ANGIO W AND WO IV CONTRAST 10/1/2022    OTHER SURGICAL HISTORY  11/21/2019    Finger amputation    OTHER SURGICAL HISTORY  11/21/2019    Vasectomy    OTHER SURGICAL HISTORY  11/21/2019    Rotator cuff repair    OTHER SURGICAL HISTORY  11/21/2019    Surgery    OTHER SURGICAL HISTORY  11/21/2019    Knee replacement    OTHER SURGICAL HISTORY   09/14/2021    Prostate surgery    OTHER SURGICAL HISTORY  06/10/2021    Insertion of prostatic urethral lift implant    OTHER SURGICAL HISTORY  10/28/2022    Craniotomy    OTHER SURGICAL HISTORY  11/18/2015    Colonoscopy    REVERSE TOTAL SHOULDER ARTHROPLASTY Right 09/25/2023     Family History   Problem Relation Name Age of Onset    Diabetes Mother      COPD Mother      Hypertension Father      Heart attack Father      Diabetes Sister      Diabetes Other       Social History     Tobacco Use    Smoking status: Former     Types: Cigarettes    Smokeless tobacco: Never   Vaping Use    Vaping status: Never Used   Substance Use Topics    Alcohol use: Never    Drug use: Never       Physical Exam   ED Triage Vitals [09/30/24 0340]   Temperature Heart Rate Respirations BP   36.4 °C (97.5 °F) 91 18 (!) 144/98      Pulse Ox Temp src Heart Rate Source Patient Position   97 % -- -- --      BP Location FiO2 (%)     -- --       Physical Exam  Constitutional:       Appearance: Normal appearance.   HENT:      Ears:      Comments: Right tympanic membrane is mildly dull and erythematous.  Slight erythema and some gelatinous drainage in the external auditory canal.  Left is within normal limits.     Nose: Nose normal.      Mouth/Throat:      Mouth: Mucous membranes are moist.   Musculoskeletal:      Cervical back: Normal range of motion.   Neurological:      Mental Status: He is alert.           ED Course & MDM   Diagnoses as of 09/30/24 0348   Acute otitis externa of right ear, unspecified type                 No data recorded                                 Medical Decision Making  1 discontinue present drops 2 take medication as prescribed 3 keep fluids out of ear 4 follow-up with [facility] doctor 1 to 2 days if worse return to ED        Procedure  Procedures     Wilberto Anders,   09/30/24 0348

## 2024-10-04 ENCOUNTER — TELEPHONE (OUTPATIENT)
Dept: PRIMARY CARE | Facility: CLINIC | Age: 64
End: 2024-10-04
Payer: COMMERCIAL

## 2024-10-04 NOTE — TELEPHONE ENCOUNTER
Hasn't taken the potassium for a couple weeks now. Asking if he is supposed to be taking it still. Asking for a call back. [phone]

## 2024-10-04 NOTE — TELEPHONE ENCOUNTER
Spoke to pt and let him know what PCP stated, expressed a verbal understanding and nothing further was needed at this time.

## 2024-10-21 ENCOUNTER — TELEPHONE (OUTPATIENT)
Dept: PRIMARY CARE | Facility: CLINIC | Age: 64
End: 2024-10-21
Payer: COMMERCIAL

## 2024-10-21 DIAGNOSIS — M47.12 CERVICAL ARTHRITIS WITH MYELOPATHY: ICD-10-CM

## 2024-10-21 RX ORDER — HYDROCODONE BITARTRATE AND ACETAMINOPHEN 10; 325 MG/1; MG/1
1 TABLET ORAL 3 TIMES DAILY PRN
Qty: 90 TABLET | Refills: 0 | Status: SHIPPED | OUTPATIENT
Start: 2024-10-21 | End: 2024-11-20

## 2024-11-18 ENCOUNTER — TELEPHONE (OUTPATIENT)
Dept: PRIMARY CARE | Facility: CLINIC | Age: 64
End: 2024-11-18
Payer: COMMERCIAL

## 2024-11-18 DIAGNOSIS — M47.12 CERVICAL ARTHRITIS WITH MYELOPATHY: ICD-10-CM

## 2024-11-18 RX ORDER — HYDROCODONE BITARTRATE AND ACETAMINOPHEN 10; 325 MG/1; MG/1
1 TABLET ORAL 3 TIMES DAILY PRN
Qty: 90 TABLET | Refills: 0 | Status: SHIPPED | OUTPATIENT
Start: 2024-11-18 | End: 2024-12-18

## 2024-11-20 PROBLEM — R39.15 URINARY URGENCY: Status: ACTIVE | Noted: 2024-11-15

## 2024-11-20 PROBLEM — K59.00 CONSTIPATION: Status: ACTIVE | Noted: 2024-11-15

## 2024-11-20 PROBLEM — N28.1 ACQUIRED BILATERAL RENAL CYSTS: Status: ACTIVE | Noted: 2024-11-15

## 2024-11-20 PROBLEM — N31.9 NEUROGENIC DYSFUNCTION OF THE URINARY BLADDER: Status: ACTIVE | Noted: 2024-11-15

## 2024-11-20 PROBLEM — R35.0 FREQUENCY OF URINATION: Status: ACTIVE | Noted: 2024-11-15

## 2024-11-20 RX ORDER — POLYETHYLENE GLYCOL 3350 17 G/17G
17 POWDER, FOR SOLUTION ORAL
COMMUNITY
Start: 2024-11-15 | End: 2025-11-15

## 2024-11-20 RX ORDER — ETODOLAC 400 MG/1
400 TABLET, FILM COATED ORAL 2 TIMES DAILY
COMMUNITY
Start: 2024-09-10

## 2024-12-06 ENCOUNTER — APPOINTMENT (OUTPATIENT)
Dept: PRIMARY CARE | Facility: CLINIC | Age: 64
End: 2024-12-06
Payer: COMMERCIAL

## 2024-12-06 VITALS
HEART RATE: 84 BPM | RESPIRATION RATE: 16 BRPM | DIASTOLIC BLOOD PRESSURE: 70 MMHG | SYSTOLIC BLOOD PRESSURE: 132 MMHG | WEIGHT: 194 LBS | BODY MASS INDEX: 31.31 KG/M2

## 2024-12-06 DIAGNOSIS — E11.9 CONTROLLED TYPE 2 DIABETES MELLITUS WITHOUT COMPLICATION, WITHOUT LONG-TERM CURRENT USE OF INSULIN (MULTI): ICD-10-CM

## 2024-12-06 DIAGNOSIS — Z01.818 PRE-OP EVALUATION: Primary | ICD-10-CM

## 2024-12-06 DIAGNOSIS — R27.0 ATAXIA: ICD-10-CM

## 2024-12-06 PROCEDURE — 4010F ACE/ARB THERAPY RXD/TAKEN: CPT | Performed by: FAMILY MEDICINE

## 2024-12-06 PROCEDURE — G2211 COMPLEX E/M VISIT ADD ON: HCPCS | Performed by: FAMILY MEDICINE

## 2024-12-06 PROCEDURE — 3078F DIAST BP <80 MM HG: CPT | Performed by: FAMILY MEDICINE

## 2024-12-06 PROCEDURE — 3062F POS MACROALBUMINURIA REV: CPT | Performed by: FAMILY MEDICINE

## 2024-12-06 PROCEDURE — 1157F ADVNC CARE PLAN IN RCRD: CPT | Performed by: FAMILY MEDICINE

## 2024-12-06 PROCEDURE — 99214 OFFICE O/P EST MOD 30 MIN: CPT | Performed by: FAMILY MEDICINE

## 2024-12-06 PROCEDURE — 3048F LDL-C <100 MG/DL: CPT | Performed by: FAMILY MEDICINE

## 2024-12-06 PROCEDURE — 1160F RVW MEDS BY RX/DR IN RCRD: CPT | Performed by: FAMILY MEDICINE

## 2024-12-06 PROCEDURE — 3052F HG A1C>EQUAL 8.0%<EQUAL 9.0%: CPT | Performed by: FAMILY MEDICINE

## 2024-12-06 PROCEDURE — 3075F SYST BP GE 130 - 139MM HG: CPT | Performed by: FAMILY MEDICINE

## 2024-12-06 PROCEDURE — 1036F TOBACCO NON-USER: CPT | Performed by: FAMILY MEDICINE

## 2024-12-06 PROCEDURE — 1159F MED LIST DOCD IN RCRD: CPT | Performed by: FAMILY MEDICINE

## 2024-12-06 RX ORDER — TAMSULOSIN HYDROCHLORIDE 0.4 MG/1
0.4 CAPSULE ORAL DAILY
COMMUNITY
Start: 2024-11-19

## 2024-12-06 RX ORDER — FLUOROMETHOLONE 1 MG/ML
1 SUSPENSION/ DROPS OPHTHALMIC 3 TIMES DAILY
COMMUNITY
Start: 2024-11-20

## 2024-12-06 NOTE — PROGRESS NOTES
Subjective   Patient ID: Kenneth Valenzuela is a 64 y.o. male who presents for Pre-op Exam (Cataract surgery right eye).    HPI here for Preop Evaluation for cataract surgery., He has multiple medical problems including allergic rhinitis, balance issues due to head trauma kidney stones, chronic pain due to arthritis and spondylosis, constipation, diabetes, depression, frequent falls, hypertension, neurogenic bladder, seizure disorder.   No Chest pain, Dyspnea, palpitations, edema, sudden numbness, sudden weakness, claudications, or sudden double vision/ loss of vision.  He is active but no stairs due to the balance.   Sugars have been low 100s.   A1C in September 8.5%    Had brain surgery for SDH a couple of years ago     CSA 2/6/24  UDS 3/4/21 as expected for medication profile 3/7/22 as expected for medication profile 2/28/23 as expected for medication profile 2/6/24 as expected for medication profile   PSA 12/1/22  Colonoscopy 11/18/15  LW and DPA yes wife and children   Review of Systems    Objective   /70 (BP Location: Left arm, Patient Position: Sitting)   Pulse 84   Resp 16   Wt 88 kg (194 lb)   BMI 31.31 kg/m²     Physical Exam  Vitals reviewed.   Constitutional:       General: He is not in acute distress.     Appearance: Normal appearance. He is obese.      Comments: Restless in chair     HENT:      Head: Normocephalic.      Right Ear: Tympanic membrane, ear canal and external ear normal.      Left Ear: Tympanic membrane, ear canal and external ear normal.      Nose: Nose normal.      Mouth/Throat:      Pharynx: Oropharynx is clear.   Eyes:      Extraocular Movements: Extraocular movements intact.      Conjunctiva/sclera: Conjunctivae normal.      Pupils: Pupils are equal, round, and reactive to light.   Neck:      Vascular: No carotid bruit.   Cardiovascular:      Rate and Rhythm: Normal rate and regular rhythm.      Pulses: Normal pulses.      Heart sounds: Normal heart sounds. No murmur  heard.  Pulmonary:      Effort: Pulmonary effort is normal. No respiratory distress.      Breath sounds: Normal breath sounds.   Abdominal:      General: Abdomen is flat. Bowel sounds are normal. There is no distension.      Palpations: Abdomen is soft. There is no mass.      Tenderness: There is no abdominal tenderness.   Musculoskeletal:         General: No tenderness. Normal range of motion.      Cervical back: Normal range of motion and neck supple. No tenderness.      Comments: Popping of left knee    Lymphadenopathy:      Cervical: No cervical adenopathy.   Skin:     General: Skin is warm and dry.      Findings: Lesion (abrasions and bruises on legs) present. No rash.   Neurological:      General: No focal deficit present.      Mental Status: He is alert and oriented to person, place, and time.   Psychiatric:         Mood and Affect: Mood normal.         Thought Content: Thought content normal.         Judgment: Judgment normal.           Assessment/Plan   Diagnoses and all orders for this visit:  Pre-op evaluation  -     Comprehensive metabolic panel; Future  -     Calcium, ionized; Future  -     ECG 12 Lead; Future  -     CBC; Future  Ataxia  -     CBC; Future  Controlled type 2 diabetes mellitus without complication, without long-term current use of insulin (Multi)  -     Comprehensive metabolic panel; Future  -     Calcium, ionized; Future  -     ECG 12 Lead; Future  -     CBC; Future    Assuming the labs and EKG are unremarkable cardiovascular exam is good to proceed with surgery     12/9/24  The labs look good. Potassium 3.4 so will supplement for a month but does not interfere with surgery.. the EKG shows slight T wave flattening compared to a year ago.   OK to proceed with surgery as planned

## 2024-12-09 ENCOUNTER — HOSPITAL ENCOUNTER (OUTPATIENT)
Dept: CARDIOLOGY | Facility: HOSPITAL | Age: 64
Discharge: HOME | End: 2024-12-09
Payer: COMMERCIAL

## 2024-12-09 ENCOUNTER — LAB (OUTPATIENT)
Dept: LAB | Facility: LAB | Age: 64
End: 2024-12-09
Payer: COMMERCIAL

## 2024-12-09 DIAGNOSIS — R27.0 ATAXIA: ICD-10-CM

## 2024-12-09 DIAGNOSIS — Z01.818 PRE-OP EVALUATION: ICD-10-CM

## 2024-12-09 DIAGNOSIS — E87.6 HYPOKALEMIA: Primary | ICD-10-CM

## 2024-12-09 DIAGNOSIS — E11.9 CONTROLLED TYPE 2 DIABETES MELLITUS WITHOUT COMPLICATION, WITHOUT LONG-TERM CURRENT USE OF INSULIN (MULTI): ICD-10-CM

## 2024-12-09 LAB
ALBUMIN SERPL BCP-MCNC: 4.6 G/DL (ref 3.4–5)
ALP SERPL-CCNC: 72 U/L (ref 33–136)
ALT SERPL W P-5'-P-CCNC: 16 U/L (ref 10–52)
ANION GAP SERPL CALC-SCNC: 13 MMOL/L (ref 10–20)
AST SERPL W P-5'-P-CCNC: 21 U/L (ref 9–39)
ATRIAL RATE: 89 BPM
BILIRUB SERPL-MCNC: 0.9 MG/DL (ref 0–1.2)
BUN SERPL-MCNC: 9 MG/DL (ref 6–23)
CA-I BLD-SCNC: 1.29 MMOL/L (ref 1.1–1.33)
CALCIUM SERPL-MCNC: 9.9 MG/DL (ref 8.6–10.3)
CHLORIDE SERPL-SCNC: 104 MMOL/L (ref 98–107)
CO2 SERPL-SCNC: 26 MMOL/L (ref 21–32)
CREAT SERPL-MCNC: 0.72 MG/DL (ref 0.5–1.3)
EGFRCR SERPLBLD CKD-EPI 2021: >90 ML/MIN/1.73M*2
ERYTHROCYTE [DISTWIDTH] IN BLOOD BY AUTOMATED COUNT: 14.1 % (ref 11.5–14.5)
GLUCOSE SERPL-MCNC: 189 MG/DL (ref 74–99)
HCT VFR BLD AUTO: 41.8 % (ref 41–52)
HGB BLD-MCNC: 13.7 G/DL (ref 13.5–17.5)
MCH RBC QN AUTO: 30 PG (ref 26–34)
MCHC RBC AUTO-ENTMCNC: 32.8 G/DL (ref 32–36)
MCV RBC AUTO: 92 FL (ref 80–100)
NRBC BLD-RTO: 0 /100 WBCS (ref 0–0)
P AXIS: 58 DEGREES
P OFFSET: 188 MS
P ONSET: 130 MS
PLATELET # BLD AUTO: 228 X10*3/UL (ref 150–450)
POTASSIUM SERPL-SCNC: 3.4 MMOL/L (ref 3.5–5.3)
PR INTERVAL: 178 MS
PROT SERPL-MCNC: 6.7 G/DL (ref 6.4–8.2)
Q ONSET: 219 MS
QRS COUNT: 14 BEATS
QRS DURATION: 78 MS
QT INTERVAL: 358 MS
QTC CALCULATION(BAZETT): 435 MS
QTC FREDERICIA: 408 MS
R AXIS: -7 DEGREES
RBC # BLD AUTO: 4.57 X10*6/UL (ref 4.5–5.9)
SODIUM SERPL-SCNC: 140 MMOL/L (ref 136–145)
T AXIS: 65 DEGREES
T OFFSET: 398 MS
VENTRICULAR RATE: 89 BPM
WBC # BLD AUTO: 4 X10*3/UL (ref 4.4–11.3)

## 2024-12-09 PROCEDURE — 93005 ELECTROCARDIOGRAM TRACING: CPT

## 2024-12-09 PROCEDURE — 85027 COMPLETE CBC AUTOMATED: CPT

## 2024-12-09 PROCEDURE — 82330 ASSAY OF CALCIUM: CPT

## 2024-12-09 PROCEDURE — 36415 COLL VENOUS BLD VENIPUNCTURE: CPT

## 2024-12-09 PROCEDURE — 93010 ELECTROCARDIOGRAM REPORT: CPT | Performed by: STUDENT IN AN ORGANIZED HEALTH CARE EDUCATION/TRAINING PROGRAM

## 2024-12-09 PROCEDURE — 80053 COMPREHEN METABOLIC PANEL: CPT

## 2024-12-09 RX ORDER — POTASSIUM CHLORIDE 750 MG/1
10 TABLET, FILM COATED, EXTENDED RELEASE ORAL DAILY
Qty: 30 TABLET | Refills: 0 | Status: SHIPPED | OUTPATIENT
Start: 2024-12-09 | End: 2025-01-08

## 2024-12-09 NOTE — RESULT ENCOUNTER NOTE
Let him know the labs look good. Can go ahead with surgery. The potassium is borderline low so I would like for him to take a pills a day for a month to restore the level.

## 2024-12-13 ENCOUNTER — TELEPHONE (OUTPATIENT)
Dept: PRIMARY CARE | Facility: CLINIC | Age: 64
End: 2024-12-13
Payer: COMMERCIAL

## 2024-12-13 DIAGNOSIS — M47.12 CERVICAL ARTHRITIS WITH MYELOPATHY: ICD-10-CM

## 2024-12-13 RX ORDER — HYDROCODONE BITARTRATE AND ACETAMINOPHEN 10; 325 MG/1; MG/1
1 TABLET ORAL 3 TIMES DAILY PRN
Qty: 90 TABLET | Refills: 0 | Status: SHIPPED | OUTPATIENT
Start: 2024-12-15 | End: 2025-01-14

## 2024-12-17 ENCOUNTER — APPOINTMENT (OUTPATIENT)
Dept: PRIMARY CARE | Facility: CLINIC | Age: 64
End: 2024-12-17
Payer: COMMERCIAL

## 2024-12-19 ENCOUNTER — PREP FOR PROCEDURE (OUTPATIENT)
Dept: OPERATING ROOM | Facility: HOSPITAL | Age: 64
End: 2024-12-19
Payer: COMMERCIAL

## 2024-12-19 ENCOUNTER — HOSPITAL ENCOUNTER (OUTPATIENT)
Facility: HOSPITAL | Age: 64
Setting detail: OUTPATIENT SURGERY
End: 2024-12-19
Attending: OPHTHALMOLOGY | Admitting: OPHTHALMOLOGY
Payer: COMMERCIAL

## 2024-12-19 DIAGNOSIS — H25.811 COMBINED FORMS OF AGE-RELATED CATARACT OF RIGHT EYE: Primary | ICD-10-CM

## 2024-12-19 RX ORDER — KETOROLAC TROMETHAMINE 5 MG/ML
1 SOLUTION OPHTHALMIC
OUTPATIENT
Start: 2024-12-19 | End: 2024-12-19

## 2024-12-19 RX ORDER — TETRACAINE HYDROCHLORIDE 5 MG/ML
1 SOLUTION OPHTHALMIC ONCE
OUTPATIENT
Start: 2024-12-19 | End: 2024-12-19

## 2024-12-19 RX ORDER — PREDNISOLONE ACETATE 10 MG/ML
1 SUSPENSION/ DROPS OPHTHALMIC ONCE
OUTPATIENT
Start: 2024-12-19 | End: 2024-12-19

## 2024-12-19 RX ORDER — POVIDONE-IODINE 5 %
SOLUTION, NON-ORAL OPHTHALMIC (EYE) ONCE
OUTPATIENT
Start: 2024-12-19 | End: 2024-12-19

## 2024-12-20 ENCOUNTER — TELEPHONE (OUTPATIENT)
Dept: PRIMARY CARE | Facility: CLINIC | Age: 64
End: 2024-12-20
Payer: COMMERCIAL

## 2024-12-20 DIAGNOSIS — M47.12 CERVICAL ARTHRITIS WITH MYELOPATHY: ICD-10-CM

## 2024-12-20 RX ORDER — HYDROCODONE BITARTRATE AND ACETAMINOPHEN 10; 325 MG/1; MG/1
1 TABLET ORAL 3 TIMES DAILY PRN
Qty: 90 TABLET | Refills: 0 | Status: SHIPPED | OUTPATIENT
Start: 2024-12-20 | End: 2025-01-19

## 2024-12-29 ENCOUNTER — APPOINTMENT (OUTPATIENT)
Dept: RADIOLOGY | Facility: HOSPITAL | Age: 64
End: 2024-12-29
Payer: COMMERCIAL

## 2024-12-29 ENCOUNTER — HOSPITAL ENCOUNTER (INPATIENT)
Facility: HOSPITAL | Age: 64
End: 2024-12-29
Attending: EMERGENCY MEDICINE
Payer: COMMERCIAL

## 2024-12-29 VITALS
SYSTOLIC BLOOD PRESSURE: 160 MMHG | RESPIRATION RATE: 18 BRPM | OXYGEN SATURATION: 94 % | TEMPERATURE: 97.9 F | HEART RATE: 125 BPM | DIASTOLIC BLOOD PRESSURE: 84 MMHG | HEIGHT: 66 IN | BODY MASS INDEX: 29.3 KG/M2 | WEIGHT: 182.32 LBS

## 2024-12-29 DIAGNOSIS — R29.6 MULTIPLE FALLS: ICD-10-CM

## 2024-12-29 DIAGNOSIS — R26.89 BALANCE DISTURBANCE DUE TO OLD HEAD INJURY: ICD-10-CM

## 2024-12-29 DIAGNOSIS — M47.12 CERVICAL ARTHRITIS WITH MYELOPATHY: ICD-10-CM

## 2024-12-29 DIAGNOSIS — R53.1 GENERALIZED WEAKNESS: Primary | ICD-10-CM

## 2024-12-29 DIAGNOSIS — S09.90XS BALANCE DISTURBANCE DUE TO OLD HEAD INJURY: ICD-10-CM

## 2024-12-29 DIAGNOSIS — G89.4 CHRONIC PAIN SYNDROME: ICD-10-CM

## 2024-12-29 LAB
ANION GAP SERPL CALC-SCNC: 17 MMOL/L (ref 10–20)
APPEARANCE UR: ABNORMAL
BASOPHILS # BLD AUTO: 0.02 X10*3/UL (ref 0–0.1)
BASOPHILS NFR BLD AUTO: 0.3 %
BILIRUB UR STRIP.AUTO-MCNC: NEGATIVE MG/DL
BUN SERPL-MCNC: 12 MG/DL (ref 6–23)
CALCIUM SERPL-MCNC: 10.5 MG/DL (ref 8.6–10.3)
CHLORIDE SERPL-SCNC: 101 MMOL/L (ref 98–107)
CO2 SERPL-SCNC: 26 MMOL/L (ref 21–32)
COLOR UR: ABNORMAL
CREAT SERPL-MCNC: 0.84 MG/DL (ref 0.5–1.3)
EGFRCR SERPLBLD CKD-EPI 2021: >90 ML/MIN/1.73M*2
EOSINOPHIL # BLD AUTO: 0.12 X10*3/UL (ref 0–0.7)
EOSINOPHIL NFR BLD AUTO: 1.5 %
ERYTHROCYTE [DISTWIDTH] IN BLOOD BY AUTOMATED COUNT: 13.6 % (ref 11.5–14.5)
GLUCOSE BLD MANUAL STRIP-MCNC: 201 MG/DL (ref 74–99)
GLUCOSE SERPL-MCNC: 141 MG/DL (ref 74–99)
GLUCOSE UR STRIP.AUTO-MCNC: NORMAL MG/DL
HCT VFR BLD AUTO: 49.4 % (ref 41–52)
HGB BLD-MCNC: 16.3 G/DL (ref 13.5–17.5)
HOLD SPECIMEN: NORMAL
HOLD SPECIMEN: NORMAL
IMM GRANULOCYTES # BLD AUTO: 0.02 X10*3/UL (ref 0–0.7)
IMM GRANULOCYTES NFR BLD AUTO: 0.3 % (ref 0–0.9)
KETONES UR STRIP.AUTO-MCNC: NEGATIVE MG/DL
LEUKOCYTE ESTERASE UR QL STRIP.AUTO: NEGATIVE
LYMPHOCYTES # BLD AUTO: 1.9 X10*3/UL (ref 1.2–4.8)
LYMPHOCYTES NFR BLD AUTO: 24.4 %
MCH RBC QN AUTO: 29.9 PG (ref 26–34)
MCHC RBC AUTO-ENTMCNC: 33 G/DL (ref 32–36)
MCV RBC AUTO: 91 FL (ref 80–100)
MONOCYTES # BLD AUTO: 0.59 X10*3/UL (ref 0.1–1)
MONOCYTES NFR BLD AUTO: 7.6 %
NEUTROPHILS # BLD AUTO: 5.15 X10*3/UL (ref 1.2–7.7)
NEUTROPHILS NFR BLD AUTO: 65.9 %
NITRITE UR QL STRIP.AUTO: NEGATIVE
NRBC BLD-RTO: 0 /100 WBCS (ref 0–0)
PH UR STRIP.AUTO: 7.5 [PH]
PLATELET # BLD AUTO: 291 X10*3/UL (ref 150–450)
POTASSIUM SERPL-SCNC: 3.5 MMOL/L (ref 3.5–5.3)
PROT UR STRIP.AUTO-MCNC: ABNORMAL MG/DL
RBC # BLD AUTO: 5.46 X10*6/UL (ref 4.5–5.9)
RBC # UR STRIP.AUTO: NEGATIVE /UL
RBC #/AREA URNS AUTO: NORMAL /HPF
SODIUM SERPL-SCNC: 140 MMOL/L (ref 136–145)
SP GR UR STRIP.AUTO: 1.01
SQUAMOUS #/AREA URNS AUTO: NORMAL /HPF
UROBILINOGEN UR STRIP.AUTO-MCNC: NORMAL MG/DL
WBC # BLD AUTO: 7.8 X10*3/UL (ref 4.4–11.3)
WBC #/AREA URNS AUTO: NORMAL /HPF

## 2024-12-29 PROCEDURE — 70450 CT HEAD/BRAIN W/O DYE: CPT | Performed by: RADIOLOGY

## 2024-12-29 PROCEDURE — 2500000001 HC RX 250 WO HCPCS SELF ADMINISTERED DRUGS (ALT 637 FOR MEDICARE OP)

## 2024-12-29 PROCEDURE — 36415 COLL VENOUS BLD VENIPUNCTURE: CPT | Performed by: EMERGENCY MEDICINE

## 2024-12-29 PROCEDURE — 70450 CT HEAD/BRAIN W/O DYE: CPT

## 2024-12-29 PROCEDURE — 82947 ASSAY GLUCOSE BLOOD QUANT: CPT

## 2024-12-29 PROCEDURE — 72192 CT PELVIS W/O DYE: CPT

## 2024-12-29 PROCEDURE — 2500000004 HC RX 250 GENERAL PHARMACY W/ HCPCS (ALT 636 FOR OP/ED)

## 2024-12-29 PROCEDURE — 73560 X-RAY EXAM OF KNEE 1 OR 2: CPT | Mod: LT

## 2024-12-29 PROCEDURE — 73560 X-RAY EXAM OF KNEE 1 OR 2: CPT | Mod: LEFT SIDE | Performed by: RADIOLOGY

## 2024-12-29 PROCEDURE — 85025 COMPLETE CBC W/AUTO DIFF WBC: CPT | Performed by: EMERGENCY MEDICINE

## 2024-12-29 PROCEDURE — 72125 CT NECK SPINE W/O DYE: CPT

## 2024-12-29 PROCEDURE — 99285 EMERGENCY DEPT VISIT HI MDM: CPT | Mod: 25 | Performed by: EMERGENCY MEDICINE

## 2024-12-29 PROCEDURE — 99222 1ST HOSP IP/OBS MODERATE 55: CPT

## 2024-12-29 PROCEDURE — 73030 X-RAY EXAM OF SHOULDER: CPT | Mod: LEFT SIDE | Performed by: RADIOLOGY

## 2024-12-29 PROCEDURE — 81001 URINALYSIS AUTO W/SCOPE: CPT | Performed by: EMERGENCY MEDICINE

## 2024-12-29 PROCEDURE — 80048 BASIC METABOLIC PNL TOTAL CA: CPT | Performed by: EMERGENCY MEDICINE

## 2024-12-29 PROCEDURE — 1100000001 HC PRIVATE ROOM DAILY

## 2024-12-29 PROCEDURE — 73030 X-RAY EXAM OF SHOULDER: CPT | Mod: LT

## 2024-12-29 PROCEDURE — 2500000001 HC RX 250 WO HCPCS SELF ADMINISTERED DRUGS (ALT 637 FOR MEDICARE OP): Performed by: EMERGENCY MEDICINE

## 2024-12-29 PROCEDURE — 72192 CT PELVIS W/O DYE: CPT | Performed by: RADIOLOGY

## 2024-12-29 PROCEDURE — 2500000002 HC RX 250 W HCPCS SELF ADMINISTERED DRUGS (ALT 637 FOR MEDICARE OP, ALT 636 FOR OP/ED)

## 2024-12-29 PROCEDURE — 72125 CT NECK SPINE W/O DYE: CPT | Performed by: RADIOLOGY

## 2024-12-29 RX ORDER — QUETIAPINE FUMARATE 25 MG/1
25 TABLET, FILM COATED ORAL 3 TIMES DAILY
Status: DISCONTINUED | OUTPATIENT
Start: 2024-12-29 | End: 2025-01-08 | Stop reason: HOSPADM

## 2024-12-29 RX ORDER — ACETAMINOPHEN 325 MG/1
975 TABLET ORAL EVERY 12 HOURS
Status: DISCONTINUED | OUTPATIENT
Start: 2024-12-29 | End: 2025-01-08 | Stop reason: HOSPADM

## 2024-12-29 RX ORDER — ACETAMINOPHEN 325 MG/1
650 TABLET ORAL EVERY 4 HOURS PRN
Status: DISCONTINUED | OUTPATIENT
Start: 2024-12-29 | End: 2024-12-29

## 2024-12-29 RX ORDER — HYDROCODONE BITARTRATE AND ACETAMINOPHEN 5; 325 MG/1; MG/1
2 TABLET ORAL 3 TIMES DAILY PRN
Status: DISCONTINUED | OUTPATIENT
Start: 2024-12-29 | End: 2024-12-29

## 2024-12-29 RX ORDER — ETODOLAC 400 MG/1
400 TABLET, FILM COATED ORAL 2 TIMES DAILY
Status: DISCONTINUED | OUTPATIENT
Start: 2024-12-29 | End: 2024-12-30

## 2024-12-29 RX ORDER — OXYCODONE HYDROCHLORIDE 5 MG/1
5 TABLET ORAL ONCE
Status: COMPLETED | OUTPATIENT
Start: 2024-12-29 | End: 2024-12-29

## 2024-12-29 RX ORDER — FLUOROMETHOLONE 1 MG/ML
1 SUSPENSION/ DROPS OPHTHALMIC 3 TIMES DAILY
Status: DISCONTINUED | OUTPATIENT
Start: 2024-12-29 | End: 2025-01-08 | Stop reason: HOSPADM

## 2024-12-29 RX ORDER — ENOXAPARIN SODIUM 100 MG/ML
40 INJECTION SUBCUTANEOUS EVERY 24 HOURS
Status: DISCONTINUED | OUTPATIENT
Start: 2024-12-29 | End: 2025-01-08 | Stop reason: HOSPADM

## 2024-12-29 RX ORDER — DULOXETIN HYDROCHLORIDE 60 MG/1
60 CAPSULE, DELAYED RELEASE ORAL DAILY
Status: DISCONTINUED | OUTPATIENT
Start: 2024-12-30 | End: 2025-01-08 | Stop reason: HOSPADM

## 2024-12-29 RX ORDER — CHOLECALCIFEROL (VITAMIN D3) 25 MCG
2000 TABLET ORAL DAILY
Status: DISCONTINUED | OUTPATIENT
Start: 2024-12-30 | End: 2025-01-08 | Stop reason: HOSPADM

## 2024-12-29 RX ORDER — FENOFIBRATE 160 MG/1
160 TABLET ORAL DAILY
Status: DISCONTINUED | OUTPATIENT
Start: 2024-12-30 | End: 2025-01-08 | Stop reason: HOSPADM

## 2024-12-29 RX ORDER — ONDANSETRON HYDROCHLORIDE 2 MG/ML
4 INJECTION, SOLUTION INTRAVENOUS EVERY 8 HOURS PRN
Status: DISCONTINUED | OUTPATIENT
Start: 2024-12-29 | End: 2024-12-31

## 2024-12-29 RX ORDER — ACETAMINOPHEN 325 MG/1
975 TABLET ORAL EVERY 6 HOURS
Status: DISCONTINUED | OUTPATIENT
Start: 2024-12-29 | End: 2024-12-29

## 2024-12-29 RX ORDER — ACETAMINOPHEN 325 MG/1
975 TABLET ORAL EVERY 8 HOURS
Status: DISCONTINUED | OUTPATIENT
Start: 2024-12-30 | End: 2024-12-29

## 2024-12-29 RX ORDER — MODAFINIL 100 MG/1
200 TABLET ORAL DAILY
Status: DISCONTINUED | OUTPATIENT
Start: 2024-12-30 | End: 2025-01-08 | Stop reason: HOSPADM

## 2024-12-29 RX ORDER — ONDANSETRON 4 MG/1
4 TABLET, ORALLY DISINTEGRATING ORAL EVERY 8 HOURS PRN
Status: DISCONTINUED | OUTPATIENT
Start: 2024-12-29 | End: 2025-01-08 | Stop reason: HOSPADM

## 2024-12-29 RX ORDER — POLYETHYLENE GLYCOL 3350 17 G/17G
17 POWDER, FOR SOLUTION ORAL
Status: DISCONTINUED | OUTPATIENT
Start: 2024-12-29 | End: 2025-01-08 | Stop reason: HOSPADM

## 2024-12-29 RX ORDER — TAMSULOSIN HYDROCHLORIDE 0.4 MG/1
0.4 CAPSULE ORAL DAILY
Status: DISCONTINUED | OUTPATIENT
Start: 2024-12-30 | End: 2025-01-08 | Stop reason: HOSPADM

## 2024-12-29 RX ORDER — LOSARTAN POTASSIUM 25 MG/1
25 TABLET ORAL DAILY
Status: DISCONTINUED | OUTPATIENT
Start: 2024-12-30 | End: 2025-01-08 | Stop reason: HOSPADM

## 2024-12-29 RX ORDER — HYDROCODONE BITARTRATE AND ACETAMINOPHEN 5; 325 MG/1; MG/1
2 TABLET ORAL 3 TIMES DAILY PRN
Status: DISCONTINUED | OUTPATIENT
Start: 2024-12-29 | End: 2025-01-08 | Stop reason: HOSPADM

## 2024-12-29 RX ORDER — METOPROLOL SUCCINATE 25 MG/1
25 TABLET, EXTENDED RELEASE ORAL DAILY
Status: DISCONTINUED | OUTPATIENT
Start: 2024-12-30 | End: 2025-01-08 | Stop reason: HOSPADM

## 2024-12-29 RX ORDER — ASCORBIC ACID 500 MG
1000 TABLET ORAL DAILY
Status: DISCONTINUED | OUTPATIENT
Start: 2024-12-30 | End: 2025-01-08 | Stop reason: HOSPADM

## 2024-12-29 RX ORDER — POTASSIUM CHLORIDE 750 MG/1
10 TABLET, FILM COATED, EXTENDED RELEASE ORAL DAILY
Status: DISCONTINUED | OUTPATIENT
Start: 2024-12-30 | End: 2024-12-31

## 2024-12-29 RX ORDER — DULOXETIN HYDROCHLORIDE 30 MG/1
30 CAPSULE, DELAYED RELEASE ORAL DAILY
Status: DISCONTINUED | OUTPATIENT
Start: 2024-12-29 | End: 2025-01-08 | Stop reason: HOSPADM

## 2024-12-29 RX ORDER — LEVETIRACETAM 500 MG/1
500 TABLET ORAL 2 TIMES DAILY
Status: DISCONTINUED | OUTPATIENT
Start: 2024-12-29 | End: 2025-01-08 | Stop reason: HOSPADM

## 2024-12-29 RX ORDER — LORAZEPAM 1 MG/1
1 TABLET ORAL ONCE
Status: COMPLETED | OUTPATIENT
Start: 2024-12-29 | End: 2024-12-29

## 2024-12-29 RX ORDER — FLUOROMETHOLONE 1 MG/ML
SUSPENSION/ DROPS OPHTHALMIC
Status: COMPLETED
Start: 2024-12-29 | End: 2024-12-29

## 2024-12-29 RX ORDER — ACETAMINOPHEN 160 MG/5ML
650 SOLUTION ORAL EVERY 4 HOURS PRN
Status: DISCONTINUED | OUTPATIENT
Start: 2024-12-29 | End: 2024-12-29

## 2024-12-29 RX ORDER — MORPHINE SULFATE 2 MG/ML
2 INJECTION, SOLUTION INTRAMUSCULAR; INTRAVENOUS EVERY 4 HOURS PRN
Status: DISCONTINUED | OUTPATIENT
Start: 2024-12-29 | End: 2024-12-30

## 2024-12-29 RX ORDER — OLANZAPINE 5 MG/1
5 TABLET ORAL NIGHTLY
Status: DISCONTINUED | OUTPATIENT
Start: 2024-12-29 | End: 2024-12-31

## 2024-12-29 RX ORDER — ACETAMINOPHEN 500 MG
1000 TABLET ORAL EVERY 6 HOURS PRN
COMMUNITY

## 2024-12-29 RX ORDER — FLUTICASONE PROPIONATE 50 MCG
1 SPRAY, SUSPENSION (ML) NASAL DAILY
Status: DISCONTINUED | OUTPATIENT
Start: 2024-12-30 | End: 2025-01-08 | Stop reason: HOSPADM

## 2024-12-29 RX ADMIN — LORAZEPAM 1 MG: 1 TABLET ORAL at 16:21

## 2024-12-29 RX ADMIN — OXYCODONE HYDROCHLORIDE 5 MG: 5 TABLET ORAL at 12:20

## 2024-12-29 RX ADMIN — ACETAMINOPHEN 975 MG: 325 TABLET ORAL at 18:07

## 2024-12-29 RX ADMIN — QUETIAPINE FUMARATE 25 MG: 25 TABLET ORAL at 18:08

## 2024-12-29 RX ADMIN — FLUOROMETHOLONE 1 DROP: 1 SOLUTION/ DROPS OPHTHALMIC at 21:21

## 2024-12-29 RX ADMIN — LEVETIRACETAM 500 MG: 500 TABLET, FILM COATED ORAL at 20:56

## 2024-12-29 RX ADMIN — ENOXAPARIN SODIUM 40 MG: 40 INJECTION SUBCUTANEOUS at 18:08

## 2024-12-29 RX ADMIN — MORPHINE SULFATE 2 MG: 2 INJECTION, SOLUTION INTRAMUSCULAR; INTRAVENOUS at 23:33

## 2024-12-29 RX ADMIN — OLANZAPINE 5 MG: 5 TABLET, FILM COATED ORAL at 20:56

## 2024-12-29 SDOH — SOCIAL STABILITY: SOCIAL INSECURITY: WITHIN THE LAST YEAR, HAVE YOU BEEN HUMILIATED OR EMOTIONALLY ABUSED IN OTHER WAYS BY YOUR PARTNER OR EX-PARTNER?: NO

## 2024-12-29 SDOH — SOCIAL STABILITY: SOCIAL INSECURITY: DOES ANYONE TRY TO KEEP YOU FROM HAVING/CONTACTING OTHER FRIENDS OR DOING THINGS OUTSIDE YOUR HOME?: NO

## 2024-12-29 SDOH — ECONOMIC STABILITY: FOOD INSECURITY: WITHIN THE PAST 12 MONTHS, YOU WORRIED THAT YOUR FOOD WOULD RUN OUT BEFORE YOU GOT THE MONEY TO BUY MORE.: NEVER TRUE

## 2024-12-29 SDOH — ECONOMIC STABILITY: FOOD INSECURITY: WITHIN THE PAST 12 MONTHS, THE FOOD YOU BOUGHT JUST DIDN'T LAST AND YOU DIDN'T HAVE MONEY TO GET MORE.: NEVER TRUE

## 2024-12-29 SDOH — SOCIAL STABILITY: SOCIAL INSECURITY
WITHIN THE LAST YEAR, HAVE YOU BEEN RAPED OR FORCED TO HAVE ANY KIND OF SEXUAL ACTIVITY BY YOUR PARTNER OR EX-PARTNER?: NO

## 2024-12-29 SDOH — SOCIAL STABILITY: SOCIAL INSECURITY: DO YOU FEEL UNSAFE GOING BACK TO THE PLACE WHERE YOU ARE LIVING?: YES

## 2024-12-29 SDOH — SOCIAL STABILITY: SOCIAL INSECURITY: WERE YOU ABLE TO COMPLETE ALL THE BEHAVIORAL HEALTH SCREENINGS?: YES

## 2024-12-29 SDOH — SOCIAL STABILITY: SOCIAL INSECURITY
WITHIN THE LAST YEAR, HAVE YOU BEEN KICKED, HIT, SLAPPED, OR OTHERWISE PHYSICALLY HURT BY YOUR PARTNER OR EX-PARTNER?: NO

## 2024-12-29 SDOH — ECONOMIC STABILITY: INCOME INSECURITY: IN THE PAST 12 MONTHS HAS THE ELECTRIC, GAS, OIL, OR WATER COMPANY THREATENED TO SHUT OFF SERVICES IN YOUR HOME?: NO

## 2024-12-29 SDOH — SOCIAL STABILITY: SOCIAL INSECURITY: HAVE YOU HAD THOUGHTS OF HARMING ANYONE ELSE?: NO

## 2024-12-29 SDOH — SOCIAL STABILITY: SOCIAL INSECURITY: ARE THERE ANY APPARENT SIGNS OF INJURIES/BEHAVIORS THAT COULD BE RELATED TO ABUSE/NEGLECT?: NO

## 2024-12-29 SDOH — SOCIAL STABILITY: SOCIAL INSECURITY: WITHIN THE LAST YEAR, HAVE YOU BEEN AFRAID OF YOUR PARTNER OR EX-PARTNER?: NO

## 2024-12-29 SDOH — SOCIAL STABILITY: SOCIAL INSECURITY: HAS ANYONE EVER THREATENED TO HURT YOUR FAMILY OR YOUR PETS?: NO

## 2024-12-29 SDOH — SOCIAL STABILITY: SOCIAL INSECURITY: ARE YOU OR HAVE YOU BEEN THREATENED OR ABUSED PHYSICALLY, EMOTIONALLY, OR SEXUALLY BY ANYONE?: NO

## 2024-12-29 SDOH — SOCIAL STABILITY: SOCIAL INSECURITY: HAVE YOU HAD ANY THOUGHTS OF HARMING ANYONE ELSE?: NO

## 2024-12-29 SDOH — SOCIAL STABILITY: SOCIAL INSECURITY: DO YOU FEEL ANYONE HAS EXPLOITED OR TAKEN ADVANTAGE OF YOU FINANCIALLY OR OF YOUR PERSONAL PROPERTY?: NO

## 2024-12-29 SDOH — SOCIAL STABILITY: SOCIAL INSECURITY: ABUSE: ADULT

## 2024-12-29 ASSESSMENT — PAIN SCALES - GENERAL
PAINLEVEL_OUTOF10: 8
PAINLEVEL_OUTOF10: 8
PAINLEVEL_OUTOF10: 0 - NO PAIN

## 2024-12-29 ASSESSMENT — COGNITIVE AND FUNCTIONAL STATUS - GENERAL
DRESSING REGULAR LOWER BODY CLOTHING: A LITTLE
MOVING FROM LYING ON BACK TO SITTING ON SIDE OF FLAT BED WITH BEDRAILS: A LOT
DRESSING REGULAR UPPER BODY CLOTHING: A LOT
DRESSING REGULAR LOWER BODY CLOTHING: A LOT
PATIENT BASELINE BEDBOUND: NO
DAILY ACTIVITIY SCORE: 14
CLIMB 3 TO 5 STEPS WITH RAILING: TOTAL
MOVING TO AND FROM BED TO CHAIR: TOTAL
STANDING UP FROM CHAIR USING ARMS: TOTAL
HELP NEEDED FOR BATHING: A LOT
MOBILITY SCORE: 12
WALKING IN HOSPITAL ROOM: TOTAL
TURNING FROM BACK TO SIDE WHILE IN FLAT BAD: A LITTLE
PERSONAL GROOMING: A LOT
STANDING UP FROM CHAIR USING ARMS: A LOT
WALKING IN HOSPITAL ROOM: TOTAL
MOVING TO AND FROM BED TO CHAIR: A LOT
TOILETING: A LOT
CLIMB 3 TO 5 STEPS WITH RAILING: TOTAL
MOVING FROM LYING ON BACK TO SITTING ON SIDE OF FLAT BED WITH BEDRAILS: A LITTLE
TURNING FROM BACK TO SIDE WHILE IN FLAT BAD: A LOT
PATIENT BASELINE BEDBOUND: NO
MOBILITY SCORE: 8

## 2024-12-29 ASSESSMENT — ENCOUNTER SYMPTOMS
ENDOCRINE NEGATIVE: 1
ABDOMINAL PAIN: 1
EYES NEGATIVE: 1
RESPIRATORY NEGATIVE: 1
CARDIOVASCULAR NEGATIVE: 1
MYALGIAS: 1
WEAKNESS: 1
ARTHRALGIAS: 1
CONSTITUTIONAL NEGATIVE: 1
TROUBLE SWALLOWING: 1
TREMORS: 1

## 2024-12-29 ASSESSMENT — ACTIVITIES OF DAILY LIVING (ADL)
WALKS IN HOME: NEEDS ASSISTANCE
HEARING - RIGHT EAR: DIFFICULTY WITH NOISE
BATHING: NEEDS ASSISTANCE
TOILETING: NEEDS ASSISTANCE
HEARING - RIGHT EAR: DIFFICULTY WITH NOISE
LACK_OF_TRANSPORTATION: NO
TOILETING: NEEDS ASSISTANCE
ASSISTIVE_DEVICE: WHEELCHAIR;WALKER;EYEGLASSES
JUDGMENT_ADEQUATE_SAFELY_COMPLETE_DAILY_ACTIVITIES: NO
ASSISTIVE_DEVICE: WHEELCHAIR;WALKER
FEEDING YOURSELF: INDEPENDENT
FEEDING YOURSELF: INDEPENDENT
GROOMING: NEEDS ASSISTANCE
ADEQUATE_TO_COMPLETE_ADL: YES
ADEQUATE_TO_COMPLETE_ADL: YES
HEARING - LEFT EAR: FUNCTIONAL
JUDGMENT_ADEQUATE_SAFELY_COMPLETE_DAILY_ACTIVITIES: NO
GROOMING: NEEDS ASSISTANCE
PATIENT'S MEMORY ADEQUATE TO SAFELY COMPLETE DAILY ACTIVITIES?: YES
HEARING - RIGHT EAR: DIFFICULTY WITH NOISE
PATIENT'S MEMORY ADEQUATE TO SAFELY COMPLETE DAILY ACTIVITIES?: YES
DRESSING YOURSELF: NEEDS ASSISTANCE
BATHING: NEEDS ASSISTANCE
HEARING - LEFT EAR: DIFFICULTY WITH NOISE
HEARING - LEFT EAR: FUNCTIONAL
WALKS IN HOME: NEEDS ASSISTANCE

## 2024-12-29 ASSESSMENT — PAIN SCALES - WONG BAKER: WONGBAKER_NUMERICALRESPONSE: HURTS WHOLE LOT

## 2024-12-29 ASSESSMENT — PATIENT HEALTH QUESTIONNAIRE - PHQ9
SUM OF ALL RESPONSES TO PHQ9 QUESTIONS 1 & 2: 2
1. LITTLE INTEREST OR PLEASURE IN DOING THINGS: SEVERAL DAYS
2. FEELING DOWN, DEPRESSED OR HOPELESS: SEVERAL DAYS

## 2024-12-29 ASSESSMENT — PAIN - FUNCTIONAL ASSESSMENT
PAIN_FUNCTIONAL_ASSESSMENT: WONG-BAKER FACES
PAIN_FUNCTIONAL_ASSESSMENT: 0-10
PAIN_FUNCTIONAL_ASSESSMENT: 0-10

## 2024-12-29 ASSESSMENT — LIFESTYLE VARIABLES
HOW OFTEN DO YOU HAVE 6 OR MORE DRINKS ON ONE OCCASION: NEVER
HOW OFTEN DO YOU HAVE A DRINK CONTAINING ALCOHOL: NEVER
AUDIT-C TOTAL SCORE: 0
HOW MANY STANDARD DRINKS CONTAINING ALCOHOL DO YOU HAVE ON A TYPICAL DAY: PATIENT DOES NOT DRINK
AUDIT-C TOTAL SCORE: 0
SKIP TO QUESTIONS 9-10: 1

## 2024-12-29 ASSESSMENT — PAIN DESCRIPTION - LOCATION
LOCATION: GENERALIZED
LOCATION: HIP

## 2024-12-29 NOTE — ASSESSMENT & PLAN NOTE
-PT/OT consult    History of traumatic brain injury/status postcraniotomy  -Continue Keppra, Seroquel, Cymbalta  -Started on Zyprexa.  Wife and patient said that he has anger issues    Chronic pain syndrome  -Tylenol scheduled around-the-clock  -Continue home [place].  Morphine for severe pain    Depression  -Continue Cymbalta    BPH  -Continue tamsulosin

## 2024-12-29 NOTE — ED PROVIDER NOTES
HPI   Chief Complaint   Patient presents with    Fall    Hip Pain    Knee Pain    Foot Injury     Patient had two mechanical falls this morning. Patient complains of left hip, knee and foot pain. Denies LOC.        Patient presents to the emergency department after multiple falls at home.  Upon further history the patient has a history of chronic gait instability dating back decades when he sustained a basilar skull fracture in the 1970s.  He presents today complaining of pain over his left shoulder, left hip, and left knee.  He does state that he struck his head but he denies losing consciousness.  Paramedics state that they were initially called for a lift assist however after evaluating the patient he did request to come to the emergency room.      History provided by:  Patient   used: No            Patient History   Past Medical History:   Diagnosis Date    Body mass index (BMI) 36.0-36.9, adult 03/04/2021    Body mass index (BMI) of 36.0 to 36.9 in adult    Impaired fasting glucose 03/04/2021    IFG (impaired fasting glucose)    Major depressive disorder, single episode, severe without psychotic features (Multi) 06/10/2021    Severe depression    Personal history of other diseases of the respiratory system 01/03/2022    History of acute sinusitis    Personal history of other specified conditions 02/08/2021    History of urinary urgency    Personal history of other specified conditions 06/10/2020    History of nocturia    Personal history of other specified conditions 02/08/2021    History of urinary frequency    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    Rib pain on right side    Unspecified fall, initial encounter 06/10/2021    Fall in home, initial encounter     Past Surgical History:   Procedure Laterality Date    CT ANGIO CORONARY ART WITH HEARTFLOW IF SCORE >30%  10/1/2022    CT ANGIO CORONARY ART WITH HEARTFLOW IF SCORE >30% 10/1/2022    CT ANGIO NECK  12/4/2023    CT ANGIO NECK 12/4/2023    CT HEAD  ANGIO W AND WO IV CONTRAST  10/1/2022    CT HEAD ANGIO W AND WO IV CONTRAST 10/1/2022    OTHER SURGICAL HISTORY  11/21/2019    Finger amputation    OTHER SURGICAL HISTORY  11/21/2019    Vasectomy    OTHER SURGICAL HISTORY  11/21/2019    Rotator cuff repair    OTHER SURGICAL HISTORY  11/21/2019    Surgery    OTHER SURGICAL HISTORY  11/21/2019    Knee replacement    OTHER SURGICAL HISTORY  09/14/2021    Prostate surgery    OTHER SURGICAL HISTORY  06/10/2021    Insertion of prostatic urethral lift implant    OTHER SURGICAL HISTORY  10/28/2022    Craniotomy    OTHER SURGICAL HISTORY  11/18/2015    Colonoscopy    REVERSE TOTAL SHOULDER ARTHROPLASTY Right 09/25/2023     Family History   Problem Relation Name Age of Onset    Diabetes Mother      COPD Mother      Hypertension Father      Heart attack Father      Diabetes Sister      Diabetes Other       Social History     Tobacco Use    Smoking status: Former     Types: Cigarettes    Smokeless tobacco: Never   Vaping Use    Vaping status: Never Used   Substance Use Topics    Alcohol use: Never    Drug use: Never       Physical Exam   ED Triage Vitals [12/29/24 1118]   Temperature Heart Rate Respirations BP   36.5 °C (97.7 °F) (!) 110 16 (!) 146/111      Pulse Ox Temp Source Heart Rate Source Patient Position   95 % Temporal Monitor Sitting      BP Location FiO2 (%)     Left arm --       Physical Exam  Vitals and nursing note reviewed.   Constitutional:       General: He is not in acute distress.     Appearance: Normal appearance. He is normal weight. He is not ill-appearing, toxic-appearing or diaphoretic.      Comments: Anxious and animated.  Provides a full history.  Not confused.   HENT:      Head: Normocephalic and atraumatic.      Comments: No cephalhematoma.  Negative Escobar sign.     Nose: Nose normal. No rhinorrhea.   Neck:      Comments: Trachea is midline.  Negative Nexus criteria  Cardiovascular:      Rate and Rhythm: Normal rate and regular rhythm.      Heart  sounds: No murmur heard.  Pulmonary:      Effort: Pulmonary effort is normal.      Breath sounds: Normal breath sounds. No wheezing.   Abdominal:      General: Abdomen is flat. Bowel sounds are normal. There is no distension.      Palpations: Abdomen is soft.      Tenderness: There is no abdominal tenderness. There is no right CVA tenderness or left CVA tenderness.   Musculoskeletal:         General: Normal range of motion.      Cervical back: Normal range of motion.      Comments: Patient has no range of motion deficits to any of the 4 extremities.  However when he raises his left arm up over his head he is complaining of pain over the left shoulder.  He is also complaining of pain over the left knee and hip although he is able to fully flex and extend these joints without deficit.   Skin:     General: Skin is warm and dry.      Findings: No rash.   Neurological:      General: No focal deficit present.      Mental Status: He is alert and oriented to person, place, and time. Mental status is at baseline.   Psychiatric:      Comments: Anxious           ED Course & MDM                  No data recorded     [place] Coma Scale Score: 15 (12/29/24 1145 : Breanne Mendosa RN)                           Medical Decision Making  Imaging studies are unremarkable.  It is noted that the patient needs assistance with from the nursing staff just to stand at the bedside and pivot to the bedside commode.  His wife is here now and she does not want him to come home as she feels she is not able to care for him any longer and the patient although reluctant is agreeable to admission for placement.  This was discussed with the hospitalist will bring the patient into their service with the goal of PT/OT as well as  to see the patient for skilled placement.        Procedure  Procedures     Demetri Richey,   12/29/24 3392

## 2024-12-29 NOTE — ASSESSMENT & PLAN NOTE
-PT/OT consult-appreciate recommendations  -Social work consult to assist with discharge planning  -He is unsure if he will be able to precipitate in physical therapy

## 2024-12-29 NOTE — H&P
"History Of Present Illness  Kenneth Valenzuela is a 64 y.o. male with past medical history of ataxia, BPH, bilateral renal stones, chronic pain syndrome, type 2 diabetes, depression, frequent falls, hyperlipidemia, subdural hematoma with right craniotomy for evacuation of subdural hematoma in 2023, hypertension,  behavioral issues, traumatic brain injury at the age of 16 from a pole vaulting accident, and seizures presented to [facility] ED with complaints of multiple falls at home.  Labs done in ER grossly within normal limits.  UA negative for UTI.  CT head and C-spine show no acute fracture or subluxation.  CT pelvis without IV contrast negative for fracture or dislocation.  X-ray to the left knee shows intact arthroplasty without fracture or dislocation.  X-ray to left shoulder shows DJD without acute fracture.  Patient received oxycodone and Ativan in the emergency room.  Patient will be admitted to medical surgical floor with working diagnosis of frequent falls and need for placement.    He fell at home trying to get up from his lift chair and tried to use his rollator when he slipped to the floor.  Wife stated that the patient was strapped in his bathroom at home by the squad when they were assisting him to the bathroom.  He states \"my balance is shot\" and uses a walker or rollator or at times a motorized wheelchair for activity.  Wife states he has had complaints of midsternal chest pain that lasted around 5 minutes over the last couple weeks.  She says that she notices he complains after eating.     Past Medical History  Past Medical History:   Diagnosis Date    Body mass index (BMI) 36.0-36.9, adult 03/04/2021    Body mass index (BMI) of 36.0 to 36.9 in adult    Impaired fasting glucose 03/04/2021    IFG (impaired fasting glucose)    Major depressive disorder, single episode, severe without psychotic features (Multi) 06/10/2021    Severe depression    Personal history of other diseases of the respiratory system " 01/03/2022    History of acute sinusitis    Personal history of other specified conditions 02/08/2021    History of urinary urgency    Personal history of other specified conditions 06/10/2020    History of nocturia    Personal history of other specified conditions 02/08/2021    History of urinary frequency    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    Rib pain on right side    Unspecified fall, initial encounter 06/10/2021    Fall in home, initial encounter       Surgical History  Past Surgical History:   Procedure Laterality Date    CT ANGIO CORONARY ART WITH HEARTFLOW IF SCORE >30%  10/1/2022    CT ANGIO CORONARY ART WITH HEARTFLOW IF SCORE >30% 10/1/2022    CT ANGIO NECK  12/4/2023    CT ANGIO NECK 12/4/2023    CT HEAD ANGIO W AND WO IV CONTRAST  10/1/2022    CT HEAD ANGIO W AND WO IV CONTRAST 10/1/2022    OTHER SURGICAL HISTORY  11/21/2019    Finger amputation    OTHER SURGICAL HISTORY  11/21/2019    Vasectomy    OTHER SURGICAL HISTORY  11/21/2019    Rotator cuff repair    OTHER SURGICAL HISTORY  11/21/2019    Surgery    OTHER SURGICAL HISTORY  11/21/2019    Knee replacement    OTHER SURGICAL HISTORY  09/14/2021    Prostate surgery    OTHER SURGICAL HISTORY  06/10/2021    Insertion of prostatic urethral lift implant    OTHER SURGICAL HISTORY  10/28/2022    Craniotomy    OTHER SURGICAL HISTORY  11/18/2015    Colonoscopy    REVERSE TOTAL SHOULDER ARTHROPLASTY Right 09/25/2023        Social History  He reports that he has quit smoking. His smoking use included cigarettes. He has never used smokeless tobacco. He reports that he does not drink alcohol and does not use drugs.  He will have a sip of alcohol on occasion but wife states he is allergic to it.  He does have CBD Gummies that he uses on occasion    Family History  Family History   Problem Relation Name Age of Onset    Diabetes Mother      COPD Mother      Hypertension Father      Heart attack Father      Diabetes Sister      Diabetes Other          Allergies  Penicillins  "and Phenytoin    Review of Systems   Constitutional: Negative.    HENT:  Positive for trouble swallowing.    Eyes: Negative.    Respiratory: Negative.     Cardiovascular: Negative.    Gastrointestinal:  Positive for abdominal pain.   Endocrine: Negative.    Genitourinary: Negative.    Musculoskeletal:  Positive for arthralgias and myalgias.   Skin: Negative.    Neurological:  Positive for tremors and weakness.   Psychiatric/Behavioral:  Positive for behavioral problems.         Physical Exam   General Appearance: AAO x 3, not in acute distress  Skin: skin color pink, warm, and dry; no suspicious rashes or lesions  Eyes : PERRL, EOM's intact  ENT: mucous membranes pink and moist  Neck: normocephalic  Respiratory: lungs clear to auscultation anteriorly; no wheezing, rhonchi, or crackles.   Heart: regular rate and rhythm.   Abdomen: Nondistended, positive bowel sounds x4, soft,  nontender  Extremities: no edema   Peripheral pulses: normal x4 extremities  Neuro: alert, coherent and conversant, no focal motor deficits.  Spasticity noted to extremities.  Difficulty in holding utensils  Last Recorded Vitals  Blood pressure (!) 137/110, pulse (!) 107, temperature 36.5 °C (97.7 °F), temperature source Temporal, resp. rate 16, height 1.676 m (5' 6\"), weight 86.2 kg (190 lb), SpO2 95%.    Relevant Results  CT pelvis wo IV contrast    Result Date: 12/29/2024  Interpreted By:  Wilberto Gong, STUDY: CT PELVIS WO IV CONTRAST;  12/29/2024 12:11 pm   INDICATION: Signs/Symptoms:fall.     COMPARISON: CT chest, abdomen and pelvis with contrast 27 April 2024   ACCESSION NUMBER(S): [id number]   ORDERING CLINICIAN: RACHAEL VILLATORO   TECHNIQUE: CT pelvis from the pelvic inlet through the symphysis pubis without IV contrast. Coronal and sagittal reformatted images created, reviewed and saved   FINDINGS: No acute fracture or hip dislocation or any other acute traumatic injury evident   Old, healed right inferior pubic ramus fracture   Severe " degenerative changes of left hip with subchondral cystic change on both sides of the joint similar to prior CT   Included lower left kidney redemonstrates portions of two cysts and a nonobstructing stone. No acute unexpected soft tissue findings including the field-of-view       No acute fracture, dislocation, other traumatic injury or any other acute process otherwise   Old, healed right inferior pubic ramus fracture   MACRO: None   Signed by: Wilberto Gong 12/29/2024 12:40 PM Dictation workstation:   [id number]    CT head wo IV contrast    Result Date: 12/29/2024  Interpreted By:  Wilberto Gong, STUDY: CT HEAD WO IV CONTRAST; CT CERVICAL SPINE WO IV CONTRAST;  12/29/2024 12:11 pm   INDICATION: Signs/Symptoms:fall.     COMPARISON: CT brain and cervical spine both from 7 April 2024   ACCESSION NUMBER(S): [id number]; [id number]   ORDERING CLINICIAN: RACHAEL VILLATORO   TECHNIQUE: CT of the brain from the skull vertex to the skull base, without intravenous contrast   CT cervical spine from the craniocervical junction through the cervicothoracic junction without IV contrast, including sagittal and coronal reformatted images   FINDINGS: CT BRAIN   TRAUMA-RELATED   Brain Injury (BIG) guidelines CT values:   Skull fracture: No SDH (subdural hematoma): None detected EDH (epidural hematoma): None detected IPH (intraparenchymal hemorrhage): None detected SAH (subarachnoid hemorrhage): None detected IVH (intraventricular hemorrhage): None detected   Reference: Aba DUTTON, Fawn RS, Nelda HIGH, et al. The BIG (brain injury guidelines) project: defining the management of traumatic brain injury by acute care surgeons. J Trauma Acute Care Surg. 2014;76:030s332.   OTHER   ACUTE INTRACRANIAL MASS EFFECT:  Negative   CT EVIDENCE OF ACUTE / SUBACUTE TERRITORIAL ISCHEMIA:  Negative   VENTRICLES:  Unchanged prominence of bilateral lateral without third or fourth ventricle enlargement or acute obstructive hydrocephalus   OTHER BRAIN  FINDINGS:  No significant interval change to the CT appearance of the brain including the degree of atrophy and deep white matter changes from chronic microvascular disease and large area of encephalomalacia in left parietotemporal distribution   INCLUDED PARANASAL SINUSES: All clear   INCLUDED MASTOID AIR CELLS: All clear   SKULL:  No lytic or blastic lesion. Right craniotomy   EXTRACRANIAL SOFT TISSUES:  Scalp and occular globes grossly normal by CT   -------   CT CERVICAL SPINE   COUNTING REFERENCE: Craniocervical junction   CRANIOCERVICAL JUNCTION:  Intact   CERVICAL ALIGNMENT:  Anatomic; no acute traumatic alignment abnormality such as subluxation   ACUTE FRACTURE: Negative   AGGRESSIVE OSSEOUS LESION: Negative   BONY CANAL AND FORAMINA:  No significant change from 7 April 2024   PARASPINAL SOFT TISSUES:  No large acute hematoma or other acute posttraumatic finding   OTHER INCLUDED STRUCTURES:  No acute or contributory soft tissue abnormality in the other cervical and upper thoracic soft tissues       NO ACUTE INTRACRANIAL PROCESS. SKULL INTACT   NO ACUTE FRACTURE OR SUBLUXATION IN THE CERVICAL SPINE   THIS REPORT SERVES AS THE DIAGNOSTIC INTERPRETATION FOR TWO EXAMS PERFORMED CONCURRENTLY: CT BRAIN WITHOUT IV CONTRAST AND CT CERVICAL SPINE WITHOUT IV CONTRAST   MACRO: None   Signed by: Wilberto Gong 12/29/2024 12:35 PM Dictation workstation:   [id number]    CT cervical spine wo IV contrast    Result Date: 12/29/2024  Interpreted By:  Wilberto Gong, STUDY: CT HEAD WO IV CONTRAST; CT CERVICAL SPINE WO IV CONTRAST;  12/29/2024 12:11 pm   INDICATION: Signs/Symptoms:fall.     COMPARISON: CT brain and cervical spine both from 7 April 2024   ACCESSION NUMBER(S): [id number]; [id number]   ORDERING CLINICIAN: RACHAEL VILLATORO   TECHNIQUE: CT of the brain from the skull vertex to the skull base, without intravenous contrast   CT cervical spine from the craniocervical junction through the cervicothoracic junction without  IV contrast, including sagittal and coronal reformatted images   FINDINGS: CT BRAIN   TRAUMA-RELATED   Brain Injury (BIG) guidelines CT values:   Skull fracture: No SDH (subdural hematoma): None detected EDH (epidural hematoma): None detected IPH (intraparenchymal hemorrhage): None detected SAH (subarachnoid hemorrhage): None detected IVH (intraventricular hemorrhage): None detected   Reference: Aba DUTTON, Fawn RS, Nelda M, et al. The BIG (brain injury guidelines) project: defining the management of traumatic brain injury by acute care surgeons. J Trauma Acute Care Surg. 2014;76:439x970.   OTHER   ACUTE INTRACRANIAL MASS EFFECT:  Negative   CT EVIDENCE OF ACUTE / SUBACUTE TERRITORIAL ISCHEMIA:  Negative   VENTRICLES:  Unchanged prominence of bilateral lateral without third or fourth ventricle enlargement or acute obstructive hydrocephalus   OTHER BRAIN FINDINGS:  No significant interval change to the CT appearance of the brain including the degree of atrophy and deep white matter changes from chronic microvascular disease and large area of encephalomalacia in left parietotemporal distribution   INCLUDED PARANASAL SINUSES: All clear   INCLUDED MASTOID AIR CELLS: All clear   SKULL:  No lytic or blastic lesion. Right craniotomy   EXTRACRANIAL SOFT TISSUES:  Scalp and occular globes grossly normal by CT   -------   CT CERVICAL SPINE   COUNTING REFERENCE: Craniocervical junction   CRANIOCERVICAL JUNCTION:  Intact   CERVICAL ALIGNMENT:  Anatomic; no acute traumatic alignment abnormality such as subluxation   ACUTE FRACTURE: Negative   AGGRESSIVE OSSEOUS LESION: Negative   BONY CANAL AND FORAMINA:  No significant change from 7 April 2024   PARASPINAL SOFT TISSUES:  No large acute hematoma or other acute posttraumatic finding   OTHER INCLUDED STRUCTURES:  No acute or contributory soft tissue abnormality in the other cervical and upper thoracic soft tissues       NO ACUTE INTRACRANIAL PROCESS. SKULL INTACT   NO ACUTE  FRACTURE OR SUBLUXATION IN THE CERVICAL SPINE   THIS REPORT SERVES AS THE DIAGNOSTIC INTERPRETATION FOR TWO EXAMS PERFORMED CONCURRENTLY: CT BRAIN WITHOUT IV CONTRAST AND CT CERVICAL SPINE WITHOUT IV CONTRAST   MACRO: None   Signed by: Wilberto Gong 12/29/2024 12:35 PM Dictation workstation:   [id number]    XR shoulder left 2+ views    Result Date: 12/29/2024  Interpreted By:  Geronimo Krishnamurthy, STUDY: XR SHOULDER LEFT 2+ VIEWS;  12/29/2024 11:57 am   INDICATION: Signs/Symptoms:fall.   COMPARISON: Prior exam from 09/08/2022   ACCESSION NUMBER(S): [id number]   ORDERING CLINICIAN: RACHAEL VILLATORO   TECHNIQUE: 3 views  of the  left shoulder were obtained.   FINDINGS: Mild-to-moderate AC joint hypertrophy with spur formation. Moderate glenohumeral joint space loss with spur formation. There is a calcified loose body overlying the scapular neck. No lytic or blastic destructive bone lesion. No acute fracture or dislocation. No opaque soft tissue foreign body. No periosteal reaction or erosion.       DJD throughout the left shoulder region as described. No acute fracture or dislocation based on this exam.   MACRO: None   Signed by: Geronimo Krishnamurthy 12/29/2024 12:09 PM Dictation workstation:   [id number]    XR knee left 1-2 views    Result Date: 12/29/2024  Interpreted By:  Geronimo Krishnamurthy, STUDY: XR KNEE LEFT 1-2 VIEWS;  12/29/2024 11:57 am   INDICATION: Signs/Symptoms:fall.   COMPARISON: Prior exam from 02/11/2019   ACCESSION NUMBER(S): [id number]   ORDERING CLINICIAN: RACHAEL VILLATORO   TECHNIQUE: AP and lateral views  of the  left knee were obtained.   FINDINGS: Previous left knee arthroplasty. Increase in soft tissue calcification in the suprapatellar bursa. There is also fluid in the bursa. Patellar component is radiolucent. Femoral and tibial components are well seated and in good alignment. No lytic or blastic destructive bone lesion. No acute fracture or dislocation. No opaque soft tissue foreign body. No periosteal  reaction or erosion.       Intact left knee arthroplasty. No acute fracture or dislocation.   Suprapatellar effusion.   Increase in nonspecific calcification within the suprapatellar bursa.   MACRO: None   Signed by: Geronimo Krishnamurthy 12/29/2024 12:08 PM Dictation workstation:   [id number]    IOL BIOMETRY W/ IOL CALC OU (BOTH EYES)    Result Date: 12/17/2024  Date of Procedure 12/16/2024. Notes Measurements only - see Procedure Record under Scanned Documents for signed results.     ECG 12 Lead    Result Date: 12/9/2024  Normal sinus rhythm Nonspecific T wave abnormality Abnormal ECG When compared with ECG of 01-SEP-2023 13:19, Nonspecific T wave abnormality now evident in Inferior leads Nonspecific T wave abnormality, worse in Lateral leads Confirmed by Des Maxwell ([id number]) on 12/9/2024 10:44:41 AM     Results for orders placed or performed during the hospital encounter of 12/29/24 (from the past 24 hours)   CBC and Auto Differential   Result Value Ref Range    WBC 7.8 4.4 - 11.3 x10*3/uL    nRBC 0.0 0.0 - 0.0 /100 WBCs    RBC 5.46 4.50 - 5.90 x10*6/uL    Hemoglobin 16.3 13.5 - 17.5 g/dL    Hematocrit 49.4 41.0 - 52.0 %    MCV 91 80 - 100 fL    MCH 29.9 26.0 - 34.0 pg    MCHC 33.0 32.0 - 36.0 g/dL    RDW 13.6 11.5 - 14.5 %    Platelets 291 150 - 450 x10*3/uL    Neutrophils % 65.9 40.0 - 80.0 %    Immature Granulocytes %, Automated 0.3 0.0 - 0.9 %    Lymphocytes % 24.4 13.0 - 44.0 %    Monocytes % 7.6 2.0 - 10.0 %    Eosinophils % 1.5 0.0 - 6.0 %    Basophils % 0.3 0.0 - 2.0 %    Neutrophils Absolute 5.15 1.20 - 7.70 x10*3/uL    Immature Granulocytes Absolute, Automated 0.02 0.00 - 0.70 x10*3/uL    Lymphocytes Absolute 1.90 1.20 - 4.80 x10*3/uL    Monocytes Absolute 0.59 0.10 - 1.00 x10*3/uL    Eosinophils Absolute 0.12 0.00 - 0.70 x10*3/uL    Basophils Absolute 0.02 0.00 - 0.10 x10*3/uL   Basic metabolic panel   Result Value Ref Range    Glucose 141 (H) 74 - 99 mg/dL    Sodium 140 136 - 145 mmol/L    Potassium 3.5 3.5  - 5.3 mmol/L    Chloride 101 98 - 107 mmol/L    Bicarbonate 26 21 - 32 mmol/L    Anion Gap 17 10 - 20 mmol/L    Urea Nitrogen 12 6 - 23 mg/dL    Creatinine 0.84 0.50 - 1.30 mg/dL    eGFR >90 >60 mL/min/1.73m*2    Calcium 10.5 (H) 8.6 - 10.3 mg/dL   Urinalysis with Reflex Culture and Microscopic   Result Value Ref Range    Color, Urine Light-Yellow Light-Yellow, Yellow, Dark-Yellow    Appearance, Urine Turbid (N) Clear    Specific Gravity, Urine 1.015 1.005 - 1.035    pH, Urine 7.5 5.0, 5.5, 6.0, 6.5, 7.0, 7.5, 8.0    Protein, Urine 20 (TRACE) NEGATIVE, 10 (TRACE), 20 (TRACE) mg/dL    Glucose, Urine Normal Normal mg/dL    Blood, Urine NEGATIVE NEGATIVE    Ketones, Urine NEGATIVE NEGATIVE mg/dL    Bilirubin, Urine NEGATIVE NEGATIVE    Urobilinogen, Urine Normal Normal mg/dL    Nitrite, Urine NEGATIVE NEGATIVE    Leukocyte Esterase, Urine NEGATIVE NEGATIVE   Urinalysis Microscopic   Result Value Ref Range    WBC, Urine 1-5 1-5, NONE /HPF    RBC, Urine 1-2 NONE, 1-2, 3-5 /HPF    Squamous Epithelial Cells, Urine 10-25 (FEW) Reference range not established. /HPF     Scheduled medications  acetaminophen, 975 mg, oral, q6h  [START ON 12/30/2024] ascorbic acid, 1,000 mg, oral, Daily  [START ON 12/30/2024] cholecalciferol, 2,000 Units, oral, Daily  enoxaparin, 40 mg, subcutaneous, q24h  etodolac, 400 mg, oral, BID  [START ON 12/30/2024] fenofibrate, 160 mg, oral, Daily  fluorometholone, 1 drop, Right Eye, TID  [START ON 12/30/2024] fluticasone, 1 spray, Each Nostril, Daily  levETIRAcetam, 500 mg, oral, BID  [START ON 12/30/2024] losartan, 25 mg, oral, Daily  [START ON 12/30/2024] metoprolol succinate XL, 25 mg, oral, Daily  [START ON 12/30/2024] modafinil, 200 mg, oral, Daily  OLANZapine, 5 mg, oral, Nightly  polyethylene glycol, 17 g, oral, Daily  QUEtiapine, 25 mg, oral, TID  [START ON 12/30/2024] tamsulosin, 0.4 mg, oral, Daily      Continuous medications     PRN medications  PRN medications: morphine, ondansetron ODT  **OR** ondansetron       Assessment/Plan     Kenneth Valenzuela is a 64 y.o. male with past medical history of ataxia, BPH, bilateral renal stones, chronic pain syndrome, type 2 diabetes, depression, frequent falls, hyperlipidemia, hypertension, subdural hematoma with right craniotomy for evacuation of subdural hematoma in 2023, behavioral issues, traumatic brain injury at the age of 16 from a pole vaulting accident, and seizures presented to [facility] ED with complaints of multiple falls at home.  CT head and C-spine show no acute fracture or subluxation.  CT pelvis without IV contrast negative for fracture or dislocation.  X-ray to the left knee shows intact arthroplasty without fracture or dislocation.  X-ray to left shoulder shows DJD without acute fracture.  Patient will be admitted to medical surgical floor with working diagnosis of frequent falls and need for placement.    Assessment & Plan  Falls frequently  -PT/OT consult-appreciate recommendations  -Social work consult to assist with discharge planning  -He is unsure if he will be able to precipitate in physical therapy  Generalized weakness  -PT/OT consult    History of traumatic brain injury/status postcraniotomy  -Continue Keppra, Seroquel, Cymbalta  -Started on Zyprexa.  Wife and patient said that he has anger issues    Chronic pain syndrome  -Tylenol scheduled around-the-clock  -Continue home [place].  Morphine for severe pain    Depression  -Continue Cymbalta    BPH  -Continue tamsulosin    Hypertension  -Continue Cozaar     Discharge disposition: Patient will need rehab placement as wife is no longer able to care for him at home.    I spent 40 minutes in the professional and overall care of this patient.      Antonella Curran, APRN-CNP

## 2024-12-30 LAB
ANION GAP SERPL CALC-SCNC: 14 MMOL/L (ref 10–20)
BUN SERPL-MCNC: 11 MG/DL (ref 6–23)
CALCIUM SERPL-MCNC: 10 MG/DL (ref 8.6–10.3)
CHLORIDE SERPL-SCNC: 103 MMOL/L (ref 98–107)
CO2 SERPL-SCNC: 25 MMOL/L (ref 21–32)
CREAT SERPL-MCNC: 0.68 MG/DL (ref 0.5–1.3)
EGFRCR SERPLBLD CKD-EPI 2021: >90 ML/MIN/1.73M*2
ERYTHROCYTE [DISTWIDTH] IN BLOOD BY AUTOMATED COUNT: 13.4 % (ref 11.5–14.5)
GLUCOSE BLD MANUAL STRIP-MCNC: 168 MG/DL (ref 74–99)
GLUCOSE BLD MANUAL STRIP-MCNC: 192 MG/DL (ref 74–99)
GLUCOSE BLD MANUAL STRIP-MCNC: 261 MG/DL (ref 74–99)
GLUCOSE BLD MANUAL STRIP-MCNC: 333 MG/DL (ref 74–99)
GLUCOSE SERPL-MCNC: 167 MG/DL (ref 74–99)
HCT VFR BLD AUTO: 47.4 % (ref 41–52)
HGB BLD-MCNC: 15.3 G/DL (ref 13.5–17.5)
HOLD SPECIMEN: NORMAL
MCH RBC QN AUTO: 29.6 PG (ref 26–34)
MCHC RBC AUTO-ENTMCNC: 32.3 G/DL (ref 32–36)
MCV RBC AUTO: 92 FL (ref 80–100)
NRBC BLD-RTO: 0 /100 WBCS (ref 0–0)
PLATELET # BLD AUTO: 247 X10*3/UL (ref 150–450)
POTASSIUM SERPL-SCNC: 3.2 MMOL/L (ref 3.5–5.3)
RBC # BLD AUTO: 5.17 X10*6/UL (ref 4.5–5.9)
SODIUM SERPL-SCNC: 139 MMOL/L (ref 136–145)
WBC # BLD AUTO: 6.6 X10*3/UL (ref 4.4–11.3)

## 2024-12-30 PROCEDURE — 80048 BASIC METABOLIC PNL TOTAL CA: CPT

## 2024-12-30 PROCEDURE — 2500000002 HC RX 250 W HCPCS SELF ADMINISTERED DRUGS (ALT 637 FOR MEDICARE OP, ALT 636 FOR OP/ED)

## 2024-12-30 PROCEDURE — 2500000002 HC RX 250 W HCPCS SELF ADMINISTERED DRUGS (ALT 637 FOR MEDICARE OP, ALT 636 FOR OP/ED): Performed by: PHYSICIAN ASSISTANT

## 2024-12-30 PROCEDURE — 82947 ASSAY GLUCOSE BLOOD QUANT: CPT

## 2024-12-30 PROCEDURE — 85027 COMPLETE CBC AUTOMATED: CPT

## 2024-12-30 PROCEDURE — 2500000004 HC RX 250 GENERAL PHARMACY W/ HCPCS (ALT 636 FOR OP/ED): Performed by: PHYSICIAN ASSISTANT

## 2024-12-30 PROCEDURE — 97165 OT EVAL LOW COMPLEX 30 MIN: CPT | Mod: GO

## 2024-12-30 PROCEDURE — 36415 COLL VENOUS BLD VENIPUNCTURE: CPT

## 2024-12-30 PROCEDURE — 2500000001 HC RX 250 WO HCPCS SELF ADMINISTERED DRUGS (ALT 637 FOR MEDICARE OP): Performed by: PHARMACIST

## 2024-12-30 PROCEDURE — 2500000004 HC RX 250 GENERAL PHARMACY W/ HCPCS (ALT 636 FOR OP/ED): Performed by: STUDENT IN AN ORGANIZED HEALTH CARE EDUCATION/TRAINING PROGRAM

## 2024-12-30 PROCEDURE — 97162 PT EVAL MOD COMPLEX 30 MIN: CPT | Mod: GP

## 2024-12-30 PROCEDURE — 2500000001 HC RX 250 WO HCPCS SELF ADMINISTERED DRUGS (ALT 637 FOR MEDICARE OP)

## 2024-12-30 PROCEDURE — 2500000004 HC RX 250 GENERAL PHARMACY W/ HCPCS (ALT 636 FOR OP/ED)

## 2024-12-30 PROCEDURE — 1100000001 HC PRIVATE ROOM DAILY

## 2024-12-30 PROCEDURE — 99232 SBSQ HOSP IP/OBS MODERATE 35: CPT | Performed by: PHYSICIAN ASSISTANT

## 2024-12-30 RX ORDER — NAPROXEN 500 MG/1
500 TABLET ORAL
Status: DISCONTINUED | OUTPATIENT
Start: 2024-12-30 | End: 2024-12-31

## 2024-12-30 RX ORDER — METOPROLOL TARTRATE 1 MG/ML
5 INJECTION, SOLUTION INTRAVENOUS EVERY 6 HOURS PRN
Status: DISCONTINUED | OUTPATIENT
Start: 2024-12-30 | End: 2025-01-08 | Stop reason: HOSPADM

## 2024-12-30 RX ORDER — POTASSIUM CHLORIDE 20 MEQ/1
40 TABLET, EXTENDED RELEASE ORAL ONCE
Status: COMPLETED | OUTPATIENT
Start: 2024-12-30 | End: 2024-12-30

## 2024-12-30 RX ORDER — MORPHINE SULFATE 2 MG/ML
2 INJECTION, SOLUTION INTRAMUSCULAR; INTRAVENOUS EVERY 4 HOURS PRN
Status: DISCONTINUED | OUTPATIENT
Start: 2024-12-30 | End: 2025-01-02

## 2024-12-30 RX ADMIN — Medication 2000 UNITS: at 09:14

## 2024-12-30 RX ADMIN — OXYCODONE HYDROCHLORIDE AND ACETAMINOPHEN 1000 MG: 500 TABLET ORAL at 09:15

## 2024-12-30 RX ADMIN — ACETAMINOPHEN 975 MG: 325 TABLET ORAL at 20:23

## 2024-12-30 RX ADMIN — ACETAMINOPHEN 975 MG: 325 TABLET ORAL at 09:14

## 2024-12-30 RX ADMIN — NAPROXEN 500 MG: 500 TABLET ORAL at 17:41

## 2024-12-30 RX ADMIN — QUETIAPINE FUMARATE 25 MG: 25 TABLET ORAL at 14:39

## 2024-12-30 RX ADMIN — QUETIAPINE FUMARATE 25 MG: 25 TABLET ORAL at 09:15

## 2024-12-30 RX ADMIN — MORPHINE SULFATE 2 MG: 2 INJECTION, SOLUTION INTRAMUSCULAR; INTRAVENOUS at 09:16

## 2024-12-30 RX ADMIN — LEVETIRACETAM 500 MG: 500 TABLET, FILM COATED ORAL at 09:15

## 2024-12-30 RX ADMIN — MORPHINE SULFATE 2 MG: 2 INJECTION, SOLUTION INTRAMUSCULAR; INTRAVENOUS at 13:24

## 2024-12-30 RX ADMIN — TAMSULOSIN HYDROCHLORIDE 0.4 MG: 0.4 CAPSULE ORAL at 09:18

## 2024-12-30 RX ADMIN — ENOXAPARIN SODIUM 40 MG: 40 INJECTION SUBCUTANEOUS at 17:41

## 2024-12-30 RX ADMIN — POTASSIUM CHLORIDE 10 MEQ: 750 TABLET, EXTENDED RELEASE ORAL at 09:16

## 2024-12-30 RX ADMIN — FLUOROMETHOLONE 1 DROP: 1 SOLUTION/ DROPS OPHTHALMIC at 14:39

## 2024-12-30 RX ADMIN — METOPROLOL SUCCINATE 25 MG: 25 TABLET, EXTENDED RELEASE ORAL at 09:16

## 2024-12-30 RX ADMIN — LOSARTAN POTASSIUM 25 MG: 25 TABLET, FILM COATED ORAL at 09:15

## 2024-12-30 RX ADMIN — FENOFIBRATE 160 MG: 160 TABLET ORAL at 09:15

## 2024-12-30 RX ADMIN — METOPROLOL TARTRATE 5 MG: 5 INJECTION INTRAVENOUS at 03:21

## 2024-12-30 RX ADMIN — MORPHINE SULFATE 2 MG: 2 INJECTION, SOLUTION INTRAMUSCULAR; INTRAVENOUS at 20:57

## 2024-12-30 RX ADMIN — OLANZAPINE 5 MG: 5 TABLET, FILM COATED ORAL at 20:23

## 2024-12-30 RX ADMIN — FLUTICASONE PROPIONATE 1 SPRAY: 50 SPRAY, METERED NASAL at 09:16

## 2024-12-30 RX ADMIN — MODAFINIL 200 MG: 100 TABLET ORAL at 09:37

## 2024-12-30 RX ADMIN — HYDROCODONE BITARTRATE AND ACETAMINOPHEN 2 TABLET: 5; 325 TABLET ORAL at 01:56

## 2024-12-30 RX ADMIN — FLUOROMETHOLONE 1 DROP: 1 SOLUTION/ DROPS OPHTHALMIC at 20:24

## 2024-12-30 RX ADMIN — LEVETIRACETAM 500 MG: 500 TABLET, FILM COATED ORAL at 20:23

## 2024-12-30 RX ADMIN — DULOXETINE HYDROCHLORIDE 60 MG: 60 CAPSULE, DELAYED RELEASE ORAL at 09:15

## 2024-12-30 RX ADMIN — POTASSIUM CHLORIDE 40 MEQ: 1500 TABLET, EXTENDED RELEASE ORAL at 09:17

## 2024-12-30 RX ADMIN — DULOXETINE HYDROCHLORIDE 30 MG: 60 CAPSULE, DELAYED RELEASE ORAL at 09:17

## 2024-12-30 RX ADMIN — QUETIAPINE FUMARATE 25 MG: 25 TABLET ORAL at 20:23

## 2024-12-30 RX ADMIN — MORPHINE SULFATE 2 MG: 2 INJECTION, SOLUTION INTRAMUSCULAR; INTRAVENOUS at 17:41

## 2024-12-30 RX ADMIN — FLUOROMETHOLONE 1 DROP: 1 SOLUTION/ DROPS OPHTHALMIC at 09:16

## 2024-12-30 ASSESSMENT — PAIN DESCRIPTION - DESCRIPTORS
DESCRIPTORS: ACHING

## 2024-12-30 ASSESSMENT — COGNITIVE AND FUNCTIONAL STATUS - GENERAL
MOVING TO AND FROM BED TO CHAIR: A LOT
CLIMB 3 TO 5 STEPS WITH RAILING: TOTAL
DAILY ACTIVITIY SCORE: 13
TURNING FROM BACK TO SIDE WHILE IN FLAT BAD: A LITTLE
TOILETING: A LOT
HELP NEEDED FOR BATHING: A LOT
CLIMB 3 TO 5 STEPS WITH RAILING: A LOT
DRESSING REGULAR UPPER BODY CLOTHING: A LOT
DRESSING REGULAR UPPER BODY CLOTHING: A LITTLE
DRESSING REGULAR LOWER BODY CLOTHING: TOTAL
STANDING UP FROM CHAIR USING ARMS: A LOT
PERSONAL GROOMING: A LITTLE
TOILETING: TOTAL
MOVING TO AND FROM BED TO CHAIR: A LOT
MOBILITY SCORE: 13
EATING MEALS: A LITTLE
WALKING IN HOSPITAL ROOM: A LOT
DAILY ACTIVITIY SCORE: 14
TURNING FROM BACK TO SIDE WHILE IN FLAT BAD: A LOT
STANDING UP FROM CHAIR USING ARMS: A LITTLE
PERSONAL GROOMING: A LITTLE
MOBILITY SCORE: 14
HELP NEEDED FOR BATHING: A LOT
EATING MEALS: A LITTLE
MOVING FROM LYING ON BACK TO SITTING ON SIDE OF FLAT BED WITH BEDRAILS: A LOT
WALKING IN HOSPITAL ROOM: A LOT
DRESSING REGULAR LOWER BODY CLOTHING: A LOT

## 2024-12-30 ASSESSMENT — PAIN - FUNCTIONAL ASSESSMENT
PAIN_FUNCTIONAL_ASSESSMENT: 0-10

## 2024-12-30 ASSESSMENT — PAIN SCALES - GENERAL
PAINLEVEL_OUTOF10: 7
PAINLEVEL_OUTOF10: 10 - WORST POSSIBLE PAIN
PAINLEVEL_OUTOF10: 0 - NO PAIN
PAINLEVEL_OUTOF10: 8
PAINLEVEL_OUTOF10: 0 - NO PAIN
PAINLEVEL_OUTOF10: 0 - NO PAIN
PAINLEVEL_OUTOF10: 10 - WORST POSSIBLE PAIN
PAINLEVEL_OUTOF10: 0 - NO PAIN
PAINLEVEL_OUTOF10: 8
PAINLEVEL_OUTOF10: 8
PAINLEVEL_OUTOF10: 0 - NO PAIN
PAINLEVEL_OUTOF10: 8

## 2024-12-30 ASSESSMENT — PAIN DESCRIPTION - LOCATION
LOCATION: GENERALIZED
LOCATION: LEG
LOCATION: ARM
LOCATION: HIP

## 2024-12-30 ASSESSMENT — PAIN DESCRIPTION - ORIENTATION
ORIENTATION: LEFT
ORIENTATION: LEFT

## 2024-12-30 ASSESSMENT — ACTIVITIES OF DAILY LIVING (ADL)
LACK_OF_TRANSPORTATION: NO
ADL_ASSISTANCE: INDEPENDENT
BATHING_ASSISTANCE: MAXIMAL

## 2024-12-30 ASSESSMENT — PAIN SCALES - WONG BAKER: WONGBAKER_NUMERICALRESPONSE: NO HURT

## 2024-12-30 NOTE — PROGRESS NOTES
Spiritual Care Visit  Spiritual Care Request    Reason for Visit:  Routine Visit: Follow-up  Continue Visiting: Yes     Request Received From:       Focus of Care:  Visited With: Patient, Family         Refer to :  Referral To:        Spiritual Care Assessment    Spiritual Assessment:  Patient Spiritual Care Encounters  Child Adaptation to Hospital: Sometimes demonstrated  Suffering Severity: Moderate  Fear Level: Moderate  Feelings of Loneliness: Moderate  Feelings of Hopelessness: Good  Coping: Often demonstrated  Social Interaction: Participates in daily activites, Cooperates in daily activities    Family Spiritual Care Encounters  Family Coping: Accepting  Family Participation in Care: Consistently demonstrated  Family Support During Treatment: Consistently demonstrated  Caregiver-Patient Relationship: Not compromised    PC-7 Assessment (Level of Unmet Needs)  Existential Struggle: Some  Spiritual/Christian Struggle: Some  Legacy: Some  Relationships: Some  Fear of Death/Dying: Some  Values/Medical Decision Making: Some  Ritual/Other: Some  PC-7 Score: 7    SDAT (Spiritual Distress Assessment Tool)  Need for Life Balance: No evidence of unmet spiritual need  Need for Connection: No evidence of unmet spiritual need  Need for Values Acknowledgement: Some evidence of unmet spiritual need  Need to Maintain Control: Some evidence of unmet spiritual need  Need to Maintain Identity: Some evidence of unmet spiritual need  SDAT Score: 3  SDAT Average Score: 0.6    Care Provided:  Intended Effects: Aligning care plan with patient's values, Build relationship of care and support, Convey a calming presence, De-esclate emotionally charged situations, Demonstrate caring and concern, Establish rapport and connectedness, Zohreh affirmation, Helping someone feel comforted, Journeying with someone in the grief process, Lessen anxiety, Lessen someone's feelings of isolation, Meaning-making  Methods: Accompany  someone in their spiritual/Christianity practice outside your you tradition, Assist with finding purpose, Assist with spiritual/Christianity practices, Collaborate with care team member, Demonstrate acceptance  Interventions: Acknowledge current situation, Acknowledge response to difficult experience, Active listening, Ask guided questions    Sense of Community and or Congregation Affiliation:  Unknown   Values/Beliefs  Cultural Requests During Hospitalization: Patient valued prayer and kindness  Spiritual Requests During Hospitalization: Patient requested scripture and prayer.  Patient requested that  pray to Massimo and to put hands on his head while praying.     Addressed Needs/Concerns and/or Tri Through:  Congregation Encounters  Congregation Needs: Sacred text, Literature, Spiritual care brochure, Prayer  Sacramental Encounters  Communion Given Indicator: No    Outcome:  Outcome of Spiritual Care Visit: Acceptance, Affirmation, [place], Comfort/healing presence, Dealing with ambiguity, Emotional release, Identifying patient's strengths/source of hope, Support system identified, Spirituality connected, Stress management, Personal disclosure/revelation, Peace/gratitude     Advance Directives:         Spiritual Care Annotation    Annotation:  Kenneth was very willing to interact and to declare his you in Massimo [place].  He spoke of the years he participated in the choir and how he actively depends on Massimo.

## 2024-12-30 NOTE — PROGRESS NOTES
12/30/24 1448   Discharge Planning   Living Arrangements Spouse/significant other   Support Systems Spouse/significant other   Assistance Needed walker, assist x2   Type of Residence Private residence   Number of Stairs to Enter Residence 3   Number of Stairs Within Residence 13   Do you have animals or pets at home? Yes   Type of Animals or Pets babysit 1 dog during day   Who is requesting discharge planning? Provider   Home or Post Acute Services Post acute facilities (Rehab/SNF/etc)   Type of Post Acute Facility Services Rehab   Expected Discharge Disposition SNF   Does the patient need discharge transport arranged? Yes   RoundTrip coordination needed? Yes   Has discharge transport been arranged? No   Financial Resource Strain   How hard is it for you to pay for the very basics like food, housing, medical care, and heating? Not hard   Housing Stability   In the last 12 months, was there a time when you were not able to pay the mortgage or rent on time? N   In the past 12 months, how many times have you moved where you were living? 0   At any time in the past 12 months, were you homeless or living in a shelter (including now)? N   Transportation Needs   In the past 12 months, has lack of transportation kept you from medical appointments or from getting medications? no   In the past 12 months, has lack of transportation kept you from meetings, work, or from getting things needed for daily living? No   Patient Choice   Provider Choice list and CMS website (https://medicare.gov/care-compare#search) for post-acute Quality and Resource Measure Data were provided and reviewed with: Patient;Family   Patient / Family choosing to utilize agency / facility established prior to hospitalization Yes   Intensity of Service   Intensity of Service 0-30 min     Care Transitions: Patient reviewed in care round meeting this AM. ADOD 24 hours. Met with patient and wife Yvette at bedside for initial assessment. Role of TCC in  discharge planning explained. PCP is Dr. Burleson. Preferred pharmacy is [facility] in [place]. Denies any difficulty obtaining/affording medications. He does require some assistance at home with ADL's that wife has been helping with. Wife is his mode of transportation and does all the cooking and cleaning. [facility] 13/13 per PT/OT with recommendations for SNF rehab. Discussed SNF with patient/wife and he is agreeable to same. Will request DSC team build and send referrals via [facility]port to [facility], [facility], and [facility] per patient choice. Care team to follow for SNF acceptance and precert. Tiffany Segovia RN/TCC

## 2024-12-30 NOTE — PROGRESS NOTES
Occupational Therapy    Evaluation    Patient Name: Kenneth Valenzuela  MRN: [id number]  Today's Date: 12/30/2024  Time Calculation  Start Time: 0839  Stop Time: 0904  Time Calculation (min): 25 min  303/303-A    Assessment  IP OT Assessment  OT Assessment: pt needing assistance for transfers and ADLs (normally indep at baseline) due to deficits in strength, balance, and increased pain.  recommend OT services to ensure safe return home  Prognosis: Good  Barriers to Discharge Home: Caregiver assistance, Cognition needs, Physical needs  Caregiver Assistance: Caregiver assistance needed per identified barriers - however, level of patient's required assistance exceeds assistance available at home  Cognition Needs: 24hr supervision for safety awareness needed, Recollection or understanding of precautions/restrictions limited  Physical Needs: Stair navigation into home limited by function/safety, 24hr mobility assistance needed, 24hr ADL assistance needed, High falls risk due to function or environment  Evaluation/Treatment Tolerance: Patient limited by pain  Medical Staff Made Aware: Yes  End of Session Communication: Bedside nurse  End of Session Patient Position: Alarm on, Up in chair    Plan:  Treatment Interventions: ADL retraining, UE strengthening/ROM, Endurance training, Neuromuscular reeducation, Patient/family training, Compensatory technique education  OT Frequency: 3 times per week  OT Discharge Recommendations: Moderate intensity level of continued care  OT Recommended Transfer Status: Assist of 2  OT - OK to Discharge: Yes (once medically stable)    Subjective     Current Problem:  1. Generalized weakness        2. Multiple falls            General:  General  Reason for Referral: 64 year old female admitted for frequent falls.  all imaging negative for fx: CT head, CT cspine, CT pelvis, xry left knee, xray left shoulder  Referred By: Bina  Past Medical History Relevant to Rehab: ataxia, BPH, bilateral renal  stones, chronic pain syndrome, type 2 diabetes, depression, frequent falls, hyperlipidemia, subdural hematoma with right craniotomy for evacuation of subdural hematoma in 2023, hypertension,  behavioral issues, traumatic brain injury at the age of 16 from a pole vaulting accident, and seizures  Family/Caregiver Present: No  Co-Treatment: PT  Co-Treatment Reason: to maximize pt safety  Prior to Session Communication: Bedside nurse  Patient Position Received: Bed, 3 rail up, Alarm on  General Comment: pt agreeable to assessment    Precautions:  Medical Precautions: Fall precautions, Seizure precautions    Vital Signs:  Vital Signs Comment: no concerns    Pain:  Pain Assessment  Pain Assessment: 0-10  0-10 (Numeric) Pain Score: 8 (hips, shoulders, neck, and shoulders)  Pain Type: Acute pain, Chronic pain    Objective     Cognition:  Overall Cognitive Status: Impaired at baseline  Orientation Level: Disoriented to situation  Attention: Exceptions to WFL  Problem Solving: Exceptions to WFL  Safety/Judgement: Exceptions to WFL  Novel Situations: Moderate  Impulsive: Moderately  Flexibility of Thought: Reduced flexibility             Home Living:  Type of Home: House  Lives With: Spouse  Home Layout: 1/2 bath on main level, Bed/bath upstairs (pt and spouse stay on main level.  sleep in recliners)  Home Access: Stairs to enter with rails  Entrance Stairs-Number of Steps: 3  Bathroom Shower/Tub:  (full bath upstairs- pt sponge bathes)  Home Living Comments: sleeps in recliner     Prior Function:  ADL Assistance: Independent (pt states that his wife will hand him items sometimes but overall can sponge bath, dress, and toilet self.)  Homemaking Assistance: Needs assistance  Ambulatory Assistance: Independent (pt is nonambulatory and uses power wheelchair.  pt able to perform transfers MI with rollator. pt states that he has to have assistance to walk and he avoids doing so because he falls frequently.)    ADL:  Eating  Assistance:  (setup)  Grooming Assistance: Minimal  Bathing Assistance: Maximal  UE Dressing Assistance: Minimal  LE Dressing Assistance: Total (pt needs BUE support on FWW)  Toileting Assistance with Device: Total    Activity Tolerance:  Endurance: Decreased tolerance for upright activites    Bed Mobility/Transfers:   Bed Mobility  Bed Mobility: Yes  Bed Mobility 1  Bed Mobility 1: Supine to sitting  Level of Assistance 1: Moderate assistance  Bed Mobility Comments 1: HOB elevated and use of bed rail  Transfers  Transfer: Yes  Transfer 1  Technique 1: Sit to stand, Stand to sit  Transfer Device 1: Walker, Gait belt  Transfer Level of Assistance 1: Minimum assistance, +2  Trials/Comments 1: pt impulsive  Transfers 2  Transfer From 2: Bed to  Transfer to 2: Chair with arms  Transfer Device 2: Walker, Gait belt  Transfer Level of Assistance 2: Minimum assistance, +2  Trials/Comments 2: assist to manage FWW.  flex knee/rotated RLE    Ambulation/Gait Training:  Functional Mobility  Functional Mobility Performed: No (pt nonambulatory at baseline)    Sitting Balance:  Static Sitting Balance  Static Sitting-Level of Assistance: Close supervision    Standing Balance:  Static Standing Balance  Static Standing-Level of Assistance: Minimum assistance    Vision: Vision - Basic Assessment  Current Vision: No visual deficits    Sensation:  Light Touch: No apparent deficits    Strength:  Strength Comments: pt with c/o severe pain with any arm movement due to arthritis.  NT at this time           Coordination:  Movements are Fluid and Coordinated: No         Outcome Measures: [facility] Daily Activity  Putting on and taking off regular lower body clothing: Total  Bathing (including washing, rinsing, drying): A lot  Putting on and taking off regular upper body clothing: A little  Toileting, which includes using toilet, bedpan or urinal: Total  Taking care of personal grooming such as brushing teeth: A little  Eating Meals: A  little  Daily Activity - Total Score: 13                       EDUCATION:     Education Documentation  Precautions, taught by Jess Ge OT at 12/30/2024 12:23 PM.  Learner: Patient  Readiness: Eager  Method: Explanation  Response: Demonstrated Understanding, Needs Reinforcement  Comment: safety with ADL/transfer    ADL Training, taught by Jess Ge OT at 12/30/2024 12:23 PM.  Learner: Patient  Readiness: Eager  Method: Explanation  Response: Demonstrated Understanding, Needs Reinforcement  Comment: safety with ADL/transfer    Education Comments  No comments found.        Goals:   Encounter Problems       Encounter Problems (Active)       ADLs       Patient will perform UB bathing  with stand by assist level of assistance. (Progressing)       Start:  12/30/24    Expected End:  01/13/25            Patient with complete lower body dressing with minimal assist  level of assistance  (Progressing)       Start:  12/30/24    Expected End:  01/13/25            Patient will complete toileting including hygiene clothing management/hygiene with minimal assist  level of assistance. (Progressing)       Start:  12/30/24    Expected End:  01/13/25               TRANSFERS       Patient will complete functional transfers with stand by assist level of assistance. (Progressing)       Start:  12/30/24    Expected End:  01/13/25

## 2024-12-30 NOTE — PROGRESS NOTES
"Kenneth Valenzuela is a 64 y.o. male on day 1 of admission presenting with Generalized weakness.    Subjective   Patient is seen and examined today and overall is doing okay.  He continues to have severe pain related to his chronic illness and contractions and chronic pain that is generalized.  Waiting for nursing home placement.       Objective     Physical Exam  General Appearance: AAO x 3, not in acute distress  Skin: skin color pink, warm, and dry; no suspicious rashes or lesions  Eyes : PERRL, EOM's intact  ENT: mucous membranes pink and moist  Neck: normocephalic  Respiratory: lungs clear to auscultation anteriorly; no wheezing, rhonchi, or crackles.   Heart: regular rate and rhythm.   Abdomen: Nondistended, positive bowel sounds x4, soft,  nontender  Extremities: no edema   Peripheral pulses: normal x4 extremities  Neuro: alert, coherent and conversant, no focal motor deficits.  Spasticity noted to extremities.  Difficulty in holding utensils    Last Recorded Vitals  Blood pressure 135/82, pulse 110, temperature 36.8 °C (98.2 °F), temperature source Temporal, resp. rate 20, height 1.676 m (5' 5.98\"), weight 82.7 kg (182 lb 5.1 oz), SpO2 93%.  Intake/Output last 3 Shifts:  I/O last 3 completed shifts:  In: 200 (2.4 mL/kg) [P.O.:200]  Out: 10 (0.1 mL/kg) [Urine:10 (0 mL/kg/hr)]  Weight: 82.7 kg     Relevant Results    Scheduled medications  acetaminophen, 975 mg, oral, q12h  ascorbic acid, 1,000 mg, oral, Daily  cholecalciferol, 2,000 Units, oral, Daily  DULoxetine, 30 mg, oral, Daily  DULoxetine, 60 mg, oral, Daily  enoxaparin, 40 mg, subcutaneous, q24h  fenofibrate, 160 mg, oral, Daily  fluorometholone, 1 drop, Right Eye, TID  fluticasone, 1 spray, Each Nostril, Daily  levETIRAcetam, 500 mg, oral, BID  losartan, 25 mg, oral, Daily  metoprolol succinate XL, 25 mg, oral, Daily  modafinil, 200 mg, oral, Daily  naproxen, 500 mg, oral, BID  OLANZapine, 5 mg, oral, Nightly  polyethylene glycol, 17 g, oral, " Daily  potassium chloride CR, 10 mEq, oral, Daily  QUEtiapine, 25 mg, oral, TID  tamsulosin, 0.4 mg, oral, Daily      Continuous medications     PRN medications  PRN medications: HYDROcodone-acetaminophen, metoprolol, morphine, ondansetron ODT **OR** ondansetron    CT pelvis wo IV contrast    Result Date: 12/29/2024  Interpreted By:  Wilberto Gong, STUDY: CT PELVIS WO IV CONTRAST;  12/29/2024 12:11 pm   INDICATION: Signs/Symptoms:fall.     COMPARISON: CT chest, abdomen and pelvis with contrast 27 April 2024   ACCESSION NUMBER(S): [id number]   ORDERING CLINICIAN: RACHAEL VILLATORO   TECHNIQUE: CT pelvis from the pelvic inlet through the symphysis pubis without IV contrast. Coronal and sagittal reformatted images created, reviewed and saved   FINDINGS: No acute fracture or hip dislocation or any other acute traumatic injury evident   Old, healed right inferior pubic ramus fracture   Severe degenerative changes of left hip with subchondral cystic change on both sides of the joint similar to prior CT   Included lower left kidney redemonstrates portions of two cysts and a nonobstructing stone. No acute unexpected soft tissue findings including the field-of-view       No acute fracture, dislocation, other traumatic injury or any other acute process otherwise   Old, healed right inferior pubic ramus fracture   MACRO: None   Signed by: Wilberto Gong 12/29/2024 12:40 PM Dictation workstation:   [id number]    CT head wo IV contrast    Result Date: 12/29/2024  Interpreted By:  Wilberto Gong, STUDY: CT HEAD WO IV CONTRAST; CT CERVICAL SPINE WO IV CONTRAST;  12/29/2024 12:11 pm   INDICATION: Signs/Symptoms:fall.     COMPARISON: CT brain and cervical spine both from 7 April 2024   ACCESSION NUMBER(S): [id number]; [id number]   ORDERING CLINICIAN: RACHAEL VILLATORO   TECHNIQUE: CT of the brain from the skull vertex to the skull base, without intravenous contrast   CT cervical spine from the craniocervical junction through the  cervicothoracic junction without IV contrast, including sagittal and coronal reformatted images   FINDINGS: CT BRAIN   TRAUMA-RELATED   Brain Injury (BIG) guidelines CT values:   Skull fracture: No SDH (subdural hematoma): None detected EDH (epidural hematoma): None detected IPH (intraparenchymal hemorrhage): None detected SAH (subarachnoid hemorrhage): None detected IVH (intraventricular hemorrhage): None detected   Reference: Aba DUTTON, Fawn RS, Nelda M, et al. The BIG (brain injury guidelines) project: defining the management of traumatic brain injury by acute care surgeons. J Trauma Acute Care Surg. 2014;76:732s534.   OTHER   ACUTE INTRACRANIAL MASS EFFECT:  Negative   CT EVIDENCE OF ACUTE / SUBACUTE TERRITORIAL ISCHEMIA:  Negative   VENTRICLES:  Unchanged prominence of bilateral lateral without third or fourth ventricle enlargement or acute obstructive hydrocephalus   OTHER BRAIN FINDINGS:  No significant interval change to the CT appearance of the brain including the degree of atrophy and deep white matter changes from chronic microvascular disease and large area of encephalomalacia in left parietotemporal distribution   INCLUDED PARANASAL SINUSES: All clear   INCLUDED MASTOID AIR CELLS: All clear   SKULL:  No lytic or blastic lesion. Right craniotomy   EXTRACRANIAL SOFT TISSUES:  Scalp and occular globes grossly normal by CT   -------   CT CERVICAL SPINE   COUNTING REFERENCE: Craniocervical junction   CRANIOCERVICAL JUNCTION:  Intact   CERVICAL ALIGNMENT:  Anatomic; no acute traumatic alignment abnormality such as subluxation   ACUTE FRACTURE: Negative   AGGRESSIVE OSSEOUS LESION: Negative   BONY CANAL AND FORAMINA:  No significant change from 7 April 2024   PARASPINAL SOFT TISSUES:  No large acute hematoma or other acute posttraumatic finding   OTHER INCLUDED STRUCTURES:  No acute or contributory soft tissue abnormality in the other cervical and upper thoracic soft tissues       NO ACUTE INTRACRANIAL  PROCESS. SKULL INTACT   NO ACUTE FRACTURE OR SUBLUXATION IN THE CERVICAL SPINE   THIS REPORT SERVES AS THE DIAGNOSTIC INTERPRETATION FOR TWO EXAMS PERFORMED CONCURRENTLY: CT BRAIN WITHOUT IV CONTRAST AND CT CERVICAL SPINE WITHOUT IV CONTRAST   MACRO: None   Signed by: Wilberto Gong 12/29/2024 12:35 PM Dictation workstation:   [id number]    CT cervical spine wo IV contrast    Result Date: 12/29/2024  Interpreted By:  Wilberto Gong, STUDY: CT HEAD WO IV CONTRAST; CT CERVICAL SPINE WO IV CONTRAST;  12/29/2024 12:11 pm   INDICATION: Signs/Symptoms:fall.     COMPARISON: CT brain and cervical spine both from 7 April 2024   ACCESSION NUMBER(S): [id number]; [id number]   ORDERING CLINICIAN: RACHAEL VILLATORO   TECHNIQUE: CT of the brain from the skull vertex to the skull base, without intravenous contrast   CT cervical spine from the craniocervical junction through the cervicothoracic junction without IV contrast, including sagittal and coronal reformatted images   FINDINGS: CT BRAIN   TRAUMA-RELATED   Brain Injury (BIG) guidelines CT values:   Skull fracture: No SDH (subdural hematoma): None detected EDH (epidural hematoma): None detected IPH (intraparenchymal hemorrhage): None detected SAH (subarachnoid hemorrhage): None detected IVH (intraventricular hemorrhage): None detected   Reference: Aba B, Fawn RS, Nelda M, et al. The BIG (brain injury guidelines) project: defining the management of traumatic brain injury by acute care surgeons. J Trauma Acute Care Surg. 2014;76:463x483.   OTHER   ACUTE INTRACRANIAL MASS EFFECT:  Negative   CT EVIDENCE OF ACUTE / SUBACUTE TERRITORIAL ISCHEMIA:  Negative   VENTRICLES:  Unchanged prominence of bilateral lateral without third or fourth ventricle enlargement or acute obstructive hydrocephalus   OTHER BRAIN FINDINGS:  No significant interval change to the CT appearance of the brain including the degree of atrophy and deep white matter changes from chronic microvascular disease and  large area of encephalomalacia in left parietotemporal distribution   INCLUDED PARANASAL SINUSES: All clear   INCLUDED MASTOID AIR CELLS: All clear   SKULL:  No lytic or blastic lesion. Right craniotomy   EXTRACRANIAL SOFT TISSUES:  Scalp and occular globes grossly normal by CT   -------   CT CERVICAL SPINE   COUNTING REFERENCE: Craniocervical junction   CRANIOCERVICAL JUNCTION:  Intact   CERVICAL ALIGNMENT:  Anatomic; no acute traumatic alignment abnormality such as subluxation   ACUTE FRACTURE: Negative   AGGRESSIVE OSSEOUS LESION: Negative   BONY CANAL AND FORAMINA:  No significant change from 7 April 2024   PARASPINAL SOFT TISSUES:  No large acute hematoma or other acute posttraumatic finding   OTHER INCLUDED STRUCTURES:  No acute or contributory soft tissue abnormality in the other cervical and upper thoracic soft tissues       NO ACUTE INTRACRANIAL PROCESS. SKULL INTACT   NO ACUTE FRACTURE OR SUBLUXATION IN THE CERVICAL SPINE   THIS REPORT SERVES AS THE DIAGNOSTIC INTERPRETATION FOR TWO EXAMS PERFORMED CONCURRENTLY: CT BRAIN WITHOUT IV CONTRAST AND CT CERVICAL SPINE WITHOUT IV CONTRAST   MACRO: None   Signed by: Wilberto Gong 12/29/2024 12:35 PM Dictation workstation:   [id number]    XR shoulder left 2+ views    Result Date: 12/29/2024  Interpreted By:  Geronimo Krishnamurthy, STUDY: XR SHOULDER LEFT 2+ VIEWS;  12/29/2024 11:57 am   INDICATION: Signs/Symptoms:fall.   COMPARISON: Prior exam from 09/08/2022   ACCESSION NUMBER(S): [id number]   ORDERING CLINICIAN: RACHAEL VILLATORO   TECHNIQUE: 3 views  of the  left shoulder were obtained.   FINDINGS: Mild-to-moderate AC joint hypertrophy with spur formation. Moderate glenohumeral joint space loss with spur formation. There is a calcified loose body overlying the scapular neck. No lytic or blastic destructive bone lesion. No acute fracture or dislocation. No opaque soft tissue foreign body. No periosteal reaction or erosion.       DJD throughout the left shoulder region as  described. No acute fracture or dislocation based on this exam.   MACRO: None   Signed by: Geronimo Krishnamurthy 12/29/2024 12:09 PM Dictation workstation:   [id number]    XR knee left 1-2 views    Result Date: 12/29/2024  Interpreted By:  Geronimo Krishnamurthy, STUDY: XR KNEE LEFT 1-2 VIEWS;  12/29/2024 11:57 am   INDICATION: Signs/Symptoms:fall.   COMPARISON: Prior exam from 02/11/2019   ACCESSION NUMBER(S): [id number]   ORDERING CLINICIAN: RACHAEL VILLATOOR   TECHNIQUE: AP and lateral views  of the  left knee were obtained.   FINDINGS: Previous left knee arthroplasty. Increase in soft tissue calcification in the suprapatellar bursa. There is also fluid in the bursa. Patellar component is radiolucent. Femoral and tibial components are well seated and in good alignment. No lytic or blastic destructive bone lesion. No acute fracture or dislocation. No opaque soft tissue foreign body. No periosteal reaction or erosion.       Intact left knee arthroplasty. No acute fracture or dislocation.   Suprapatellar effusion.   Increase in nonspecific calcification within the suprapatellar bursa.   MACRO: None   Signed by: Geronimo Krishnamurthy 12/29/2024 12:08 PM Dictation workstation:   [id number]     Results for orders placed or performed during the hospital encounter of 12/29/24 (from the past 24 hours)   POCT GLUCOSE   Result Value Ref Range    POCT Glucose 201 (H) 74 - 99 mg/dL   Basic metabolic panel   Result Value Ref Range    Glucose 167 (H) 74 - 99 mg/dL    Sodium 139 136 - 145 mmol/L    Potassium 3.2 (L) 3.5 - 5.3 mmol/L    Chloride 103 98 - 107 mmol/L    Bicarbonate 25 21 - 32 mmol/L    Anion Gap 14 10 - 20 mmol/L    Urea Nitrogen 11 6 - 23 mg/dL    Creatinine 0.68 0.50 - 1.30 mg/dL    eGFR >90 >60 mL/min/1.73m*2    Calcium 10.0 8.6 - 10.3 mg/dL   CBC   Result Value Ref Range    WBC 6.6 4.4 - 11.3 x10*3/uL    nRBC 0.0 0.0 - 0.0 /100 WBCs    RBC 5.17 4.50 - 5.90 x10*6/uL    Hemoglobin 15.3 13.5 - 17.5 g/dL    Hematocrit 47.4 41.0 - 52.0 %     MCV 92 80 - 100 fL    MCH 29.6 26.0 - 34.0 pg    MCHC 32.3 32.0 - 36.0 g/dL    RDW 13.4 11.5 - 14.5 %    Platelets 247 150 - 450 x10*3/uL   POCT GLUCOSE   Result Value Ref Range    POCT Glucose 168 (H) 74 - 99 mg/dL   POCT GLUCOSE   Result Value Ref Range    POCT Glucose 192 (H) 74 - 99 mg/dL     *Note: Due to a large number of results and/or encounters for the requested time period, some results have not been displayed. A complete set of results can be found in Results Review.                                   Assessment/Plan     Kenneth Valenzuela is a 64 y.o. male with past medical history of ataxia, BPH, bilateral renal stones, chronic pain syndrome, type 2 diabetes, depression, frequent falls, hyperlipidemia, hypertension, subdural hematoma with right craniotomy for evacuation of subdural hematoma in 2023, behavioral issues, traumatic brain injury at the age of 16 from a pole vaulting accident, and seizures presented to [facility] ED with complaints of multiple falls at home.  CT head and C-spine show no acute fracture or subluxation.  CT pelvis without IV contrast negative for fracture or dislocation.  X-ray to the left knee shows intact arthroplasty without fracture or dislocation.  X-ray to left shoulder shows DJD without acute fracture.  Patient will be admitted to medical surgical floor with working diagnosis of frequent falls and need for placement.     Assessment & Plan  Generalized weakness  -PT/OT consult     History of traumatic brain injury/status postcraniotomy  -Continue Keppra, Seroquel, Cymbalta  -Started on Zyprexa.  Wife and patient said that he has anger issues     Chronic pain syndrome  -Tylenol scheduled around-the-clock  -Continue home [place].  Morphine for severe pain     Depression  -Continue Cymbalta     BPH  -Continue tamsulosin     Hypertension  -Continue Cozaar    Hypokalemia  - Potassium given x 1 today.     Falls frequently    -PT/OT consult-appreciate recommendations  -Social work  consult to assist with discharge planning  -He is unsure if he will be able to precipitate in physical therapy      Discharge disposition: Patient will need rehab placement as wife is no longer able to care for him at home. Pending discharge once placement is found.        I spent 30 minutes in the professional and overall care of this patient.      Sweta Sharma PA-C

## 2024-12-30 NOTE — ASSESSMENT & PLAN NOTE
-PT/OT consult     History of traumatic brain injury/status postcraniotomy  -Continue Keppra, Seroquel, Cymbalta  -Started on Zyprexa.  Wife and patient said that he has anger issues     Chronic pain syndrome  -Tylenol scheduled around-the-clock  -Continue home [place].  Morphine for severe pain     Depression  -Continue Cymbalta     BPH  -Continue tamsulosin     Hypertension  -Continue Cozaar    Hypokalemia  - Potassium given x 1 today.

## 2024-12-30 NOTE — CARE PLAN
Problem: Skin  Goal: Decreased wound size/increased tissue granulation at next dressing change  Outcome: Progressing  Flowsheets (Taken 12/30/2024 0752)  Decreased wound size/increased tissue granulation at next dressing change: Promote sleep for wound healing  Goal: Participates in plan/prevention/treatment measures  Outcome: Progressing  Flowsheets (Taken 12/30/2024 0752)  Participates in plan/prevention/treatment measures: Increase activity/out of bed for meals  Goal: Prevent/manage excess moisture  Outcome: Progressing  Flowsheets (Taken 12/30/2024 0752)  Prevent/manage excess moisture: Moisturize dry skin  Goal: Prevent/minimize sheer/friction injuries  Outcome: Progressing  Flowsheets (Taken 12/30/2024 0752)  Prevent/minimize sheer/friction injuries: Use pull sheet  Goal: Promote/optimize nutrition  Outcome: Progressing  Flowsheets (Taken 12/30/2024 0752)  Promote/optimize nutrition: Consume > 50% meals/supplements  Goal: Promote skin healing  Outcome: Progressing  Flowsheets (Taken 12/30/2024 0752)  Promote skin healing: Turn/reposition every 2 hours/use positioning/transfer devices     Problem: Diabetes  Goal: Achieve decreasing blood glucose levels by end of shift  Outcome: Progressing  Goal: Increase stability of blood glucose readings by end of shift  Outcome: Progressing  Goal: Decrease in ketones present in urine by end of shift  Outcome: Progressing  Goal: Maintain electrolyte levels within acceptable range throughout shift  Outcome: Progressing  Goal: Maintain glucose levels >70mg/dl to <250mg/dl throughout shift  Outcome: Progressing  Goal: No changes in neurological exam by end of shift  Outcome: Progressing  Goal: Learn about and adhere to nutrition recommendations by end of shift  Outcome: Progressing  Goal: Vital signs within normal range for age by end of shift  Outcome: Progressing  Goal: Increase self care and/or family involovement by end of shift  Outcome: Progressing  Goal: Receive DSME  education by end of shift  Outcome: Progressing     Problem: Pain - Adult  Goal: Verbalizes/displays adequate comfort level or baseline comfort level  Outcome: Progressing     Problem: Safety - Adult  Goal: Free from fall injury  Outcome: Progressing     Problem: Discharge Planning  Goal: Discharge to home or other facility with appropriate resources  Outcome: Progressing     Problem: Chronic Conditions and Co-morbidities  Goal: Patient's chronic conditions and co-morbidity symptoms are monitored and maintained or improved  Outcome: Progressing     Problem: Pain  Goal: Takes deep breaths with improved pain control throughout the shift  Outcome: Progressing  Goal: Turns in bed with improved pain control throughout the shift  Outcome: Progressing  Goal: Walks with improved pain control throughout the shift  Outcome: Progressing  Goal: Performs ADL's with improved pain control throughout shift  Outcome: Progressing  Goal: Participates in PT with improved pain control throughout the shift  Outcome: Progressing  Goal: Free from opioid side effects throughout the shift  Outcome: Progressing  Goal: Free from acute confusion related to pain meds throughout the shift  Outcome: Progressing     Problem: Fall/Injury  Goal: Not fall by end of shift  Outcome: Progressing  Goal: Be free from injury by end of the shift  Outcome: Progressing  Goal: Verbalize understanding of personal risk factors for fall in the hospital  Outcome: Progressing  Goal: Verbalize understanding of risk factor reduction measures to prevent injury from fall in the home  Outcome: Progressing  Goal: Use assistive devices by end of the shift  Outcome: Progressing  Goal: Pace activities to prevent fatigue by end of the shift  Outcome: Progressing   The patient's goals for the shift include      The clinical goals for the shift include no falls    Over the shift, the patient did not make progress toward the following goals. Barriers to progression include .  Recommendations to address these barriers include .

## 2024-12-30 NOTE — PROGRESS NOTES
Physical Therapy    Physical Therapy Evaluation    Patient Name: Kenneth Valenzuela  MRN: [id number]  Today's Date: 12/30/2024   Time Calculation  Start Time: 0840  Stop Time: 0904  Time Calculation (min): 24 min  303/303-A    Assessment/Plan   PT Assessment  PT Assessment Results: Decreased strength, Decreased range of motion, Decreased endurance, Impaired balance, Decreased mobility, Impaired judgement, Decreased safety awareness, Pain  Rehab Prognosis: Fair  Barriers to Discharge Home: Cognition needs, Caregiver assistance  Caregiver Assistance: Caregiver assistance needed per identified barriers - however, level of patient's required assistance exceeds assistance available at home  Cognition Needs: 24hr supervision for safety awareness needed  Evaluation/Treatment Tolerance: Patient limited by fatigue  End of Session Communication: Bedside nurse  Assessment Comment: Pt presents with decreased functional mobility as displayed by weakness, decreased balance, decreased endurance, and poor safety awareness. Pt will benefit from continued PT to address these deficits.  End of Session Patient Position: Up in chair, Alarm on (lunch tray setup, all needs met.)  IP OR SWING BED PT PLAN  Inpatient or Swing Bed: Inpatient  PT Plan  Treatment/Interventions: Bed mobility, Transfer training, Stair training, Gait training, Balance training, Neuromuscular re-education, Strengthening, Endurance training, Range of motion, Therapeutic exercise, Therapeutic activity, Home exercise program  PT Plan: Ongoing PT  PT Frequency: 3 times per week  PT Discharge Recommendations: Moderate intensity level of continued care, 24 hr supervision due to cognition  PT Recommended Transfer Status: Assistive device, Assist x2  PT - OK to Discharge: Yes (PT eval complete, ok to d/c once deemed medically appropriate)    Subjective     Current Problem:  1. Generalized weakness        2. Multiple falls          Patient Active Problem List   Diagnosis     Allergic rhinitis    Ataxia    Benign prostatic hyperplasia with lower urinary tract symptoms    Bilateral renal stones    Cervical arthritis with myelopathy    Chronic pain syndrome    Controlled type 2 diabetes mellitus without complication, without long-term current use of insulin (Multi)    Depression, major, single episode, moderate (Multi)    Falls frequently    Hyperlipidemia    Hypertension    Impingement syndrome of left shoulder    Male erectile disorder    Primary osteoarthritis of both hips    Primary osteoarthritis of left shoulder    Seizure (Multi)    Subdural hematoma (Multi)    Unspecified intracranial injury with loss of consciousness greater than 24 hours with return to pre-existing conscious level, initial encounter (Multi)    Balance disturbance due to old head injury    Behavior-irritability    Cognitive deficit as late effect of traumatic brain injury    Diabetic polyneuropathy associated with type 2 diabetes mellitus (Multi)    Difficulty controlling behavior as late effect traumatic brain injury    Hip pain, left    Hypercalcemia    Migraine without aura and without status migrainosus, not intractable    Median nerve entrapment    Class 1 obesity due to excess calories with serious comorbidity and body mass index (BMI) of 31.0 to 31.9 in adult    Tachycardia    Thoracic back pain    Generalized weakness    Traumatic retroperitoneal hematoma    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    Acquired bilateral renal cysts    Constipation    Frequency of urination    Neurogenic dysfunction of the urinary bladder    Urinary urgency    Combined forms of age-related cataract of right eye       General Visit Information:  General  Reason for Referral: impaired mobility  Referred By: [facility] PT/OT eval and treat  Past Medical History Relevant to Rehab: HLD, HTN, depression, DM, TBI (basilar skull fx 1970's in pole vaulting accident), ataxia, subdural hematoma w R craniotomy 2023,  "seizures, behavioral issues  Co-Treatment: OT  Co-Treatment Reason: Maximize pt safety  Prior to Session Communication: Bedside nurse  Patient Position Received: Alarm on, Bed, 4 rail up (seizure pads present, purewick external catheter)  General Comment: Pt to ED 12/29 with 2 falls and L hip, knee and foot pain. UA (-) UTI, CTH/c spine (-) fx, CT pelvis (-)fx, severe L hip degenration; XR L knee intact arthroplasty (-) fx, XR L shoulder (-) fx.    Home Living:  Home Living  Type of Home: House  Lives With: Spouse  Home Adaptive Equipment:  (rollator, electric wheelchair)  Home Layout: Two level, 1/2 bath on main level (sleeps in recliner on 1st floor)  Home Access: Stairs to enter with rails  Entrance Stairs-Number of Steps: 3  Bathroom Shower/Tub:  (sponge bathes)    Prior Level of Function:  Prior Function Per Pt/Caregiver Report  Level of [place]: Independent with ADLs and functional transfers, Needs assistance with homemaking  Prior Function Comments: Assist for setup of sponge bathing and dressing. Assist to ambulate short distances with rollator. Typically squat pivots to power chair with use of rollator. Spouse completes all cooking, cleaning, and laundry. Reports \"many\" falls in the last 3 months.    Precautions:  Precautions  Medical Precautions: Fall precautions, Seizure precautions    Vital Signs:     Objective     Pain:  Pain Assessment  Pain Assessment: 0-10  0-10 (Numeric) Pain Score: 8  Pain Location:  (\"all over\")    Cognition:  Cognition  Overall Cognitive Status: Impaired at baseline  Orientation Level: Oriented X4  Following Commands: Follows one step commands without difficulty  Impulsive: Mildly  Processing Speed: Delayed    General Assessments:      Activity Tolerance  Endurance: Decreased tolerance for upright activites     Strength  Strength Comments: RLE MMT hip flex 3/5, knee ext 3-/5, ankle DF 4/5, LLE MMT hip flex 3/5, knee ext 3-/5, ankle DF 4/5           Static Sitting " Balance  Static Sitting-Comment/Number of Minutes: F  Dynamic Sitting Balance  Dynamic Sitting-Comments: F-  Static Standing Balance  Static Standing-Comment/Number of Minutes: F-  Dynamic Standing Balance  Dynamic Standing-Comments: P+    Functional Assessments:     Bed Mobility  Bed Mobility:  (sup > sit with HOB ~60 degrees mod Ax1)  Transfers  Transfer:  (sit > stand with FWW and min Ax2; impulsive, unsteady upon stand, unable to acheive full upright posture)  Ambulation/Gait Training  Ambulation/Gait Training Performed:  (~3' bed > recliner with FWW and min A x2 for ww management and balance. Fatigues quickly.)          Extremity/Trunk Assessments:        RLE   RLE :  (R knee severe varus)  LLE   LLE :  (ROM WFL)    Outcome Measures:     [facility] Basic Mobility  Turning from your back to your side while in a flat bed without using bedrails: A lot  Moving from lying on your back to sitting on the side of a flat bed without using bedrails: A lot  Moving to and from bed to chair (including a wheelchair): A lot  Standing up from a chair using your arms (e.g. wheelchair or bedside chair): A little  To walk in hospital room: A lot  Climbing 3-5 steps with railing: A lot  Basic Mobility - Total Score: 13                                                             Goals:  Encounter Problems       Encounter Problems (Active)       PT Problem       Pt will demo stand pivot transfer and sit > stand > sit transfer with FWW and CGA  (Progressing)       Start:  12/30/24    Expected End:  01/13/25            Pt will ambulate 3' with FWW and CGA, without LOB  (Progressing)       Start:  12/30/24    Expected End:  01/13/25            Pt will sit EOB x5 minutes with supervision while participating in therex program to demonstrate improved trunk control (Progressing)       Start:  12/30/24    Expected End:  01/13/25            Pt will demonstrate ability to tolerate 8 minutes of seated or standing therapeutic exercise with 4 or less  rest breaks to demonstrate improved activity tolerance.  (Progressing)       Start:  12/30/24    Expected End:  01/13/25               Pain - Adult            Education Documentation  Mobility Training, taught by Sarah Jensen PT at 12/30/2024 12:27 PM.  Learner: Patient  Readiness: Acceptance  Method: Explanation  Response: Needs Reinforcement  Comment: PT POC, safety, fall risk

## 2024-12-31 LAB
ANION GAP SERPL CALC-SCNC: 15 MMOL/L (ref 10–20)
BUN SERPL-MCNC: 17 MG/DL (ref 6–23)
CALCIUM SERPL-MCNC: 10 MG/DL (ref 8.6–10.3)
CHLORIDE SERPL-SCNC: 104 MMOL/L (ref 98–107)
CO2 SERPL-SCNC: 23 MMOL/L (ref 21–32)
CREAT SERPL-MCNC: 0.75 MG/DL (ref 0.5–1.3)
EGFRCR SERPLBLD CKD-EPI 2021: >90 ML/MIN/1.73M*2
GLUCOSE BLD MANUAL STRIP-MCNC: 177 MG/DL (ref 74–99)
GLUCOSE BLD MANUAL STRIP-MCNC: 192 MG/DL (ref 74–99)
GLUCOSE BLD MANUAL STRIP-MCNC: 210 MG/DL (ref 74–99)
GLUCOSE BLD MANUAL STRIP-MCNC: 252 MG/DL (ref 74–99)
GLUCOSE SERPL-MCNC: 192 MG/DL (ref 74–99)
HOLD SPECIMEN: NORMAL
HOLD SPECIMEN: NORMAL
POTASSIUM SERPL-SCNC: 3.3 MMOL/L (ref 3.5–5.3)
SODIUM SERPL-SCNC: 139 MMOL/L (ref 136–145)

## 2024-12-31 PROCEDURE — 82374 ASSAY BLOOD CARBON DIOXIDE: CPT | Performed by: PHYSICIAN ASSISTANT

## 2024-12-31 PROCEDURE — 2500000001 HC RX 250 WO HCPCS SELF ADMINISTERED DRUGS (ALT 637 FOR MEDICARE OP): Performed by: PHARMACIST

## 2024-12-31 PROCEDURE — 2500000002 HC RX 250 W HCPCS SELF ADMINISTERED DRUGS (ALT 637 FOR MEDICARE OP, ALT 636 FOR OP/ED)

## 2024-12-31 PROCEDURE — 2500000004 HC RX 250 GENERAL PHARMACY W/ HCPCS (ALT 636 FOR OP/ED)

## 2024-12-31 PROCEDURE — 82947 ASSAY GLUCOSE BLOOD QUANT: CPT

## 2024-12-31 PROCEDURE — 2500000001 HC RX 250 WO HCPCS SELF ADMINISTERED DRUGS (ALT 637 FOR MEDICARE OP)

## 2024-12-31 PROCEDURE — 2500000004 HC RX 250 GENERAL PHARMACY W/ HCPCS (ALT 636 FOR OP/ED): Performed by: PHYSICIAN ASSISTANT

## 2024-12-31 PROCEDURE — 36415 COLL VENOUS BLD VENIPUNCTURE: CPT | Performed by: PHYSICIAN ASSISTANT

## 2024-12-31 PROCEDURE — 2500000002 HC RX 250 W HCPCS SELF ADMINISTERED DRUGS (ALT 637 FOR MEDICARE OP, ALT 636 FOR OP/ED): Performed by: NURSE PRACTITIONER

## 2024-12-31 PROCEDURE — 99359 PROLONG SERV W/O CONTACT ADD: CPT | Performed by: NURSE PRACTITIONER

## 2024-12-31 PROCEDURE — 1200000002 HC GENERAL ROOM WITH TELEMETRY DAILY

## 2024-12-31 RX ORDER — POTASSIUM CHLORIDE 20 MEQ/1
40 TABLET, EXTENDED RELEASE ORAL DAILY
Status: DISCONTINUED | OUTPATIENT
Start: 2024-12-31 | End: 2025-01-08 | Stop reason: HOSPADM

## 2024-12-31 RX ADMIN — QUETIAPINE FUMARATE 25 MG: 25 TABLET ORAL at 10:14

## 2024-12-31 RX ADMIN — MORPHINE SULFATE 2 MG: 2 INJECTION, SOLUTION INTRAMUSCULAR; INTRAVENOUS at 05:39

## 2024-12-31 RX ADMIN — ACETAMINOPHEN 975 MG: 325 TABLET ORAL at 20:11

## 2024-12-31 RX ADMIN — MODAFINIL 200 MG: 100 TABLET ORAL at 10:21

## 2024-12-31 RX ADMIN — QUETIAPINE FUMARATE 25 MG: 25 TABLET ORAL at 20:11

## 2024-12-31 RX ADMIN — ACETAMINOPHEN 975 MG: 325 TABLET ORAL at 10:14

## 2024-12-31 RX ADMIN — POTASSIUM CHLORIDE 40 MEQ: 1500 TABLET, EXTENDED RELEASE ORAL at 10:14

## 2024-12-31 RX ADMIN — MORPHINE SULFATE 2 MG: 2 INJECTION, SOLUTION INTRAMUSCULAR; INTRAVENOUS at 10:09

## 2024-12-31 RX ADMIN — Medication 2000 UNITS: at 10:14

## 2024-12-31 RX ADMIN — QUETIAPINE FUMARATE 25 MG: 25 TABLET ORAL at 15:35

## 2024-12-31 RX ADMIN — NAPROXEN 500 MG: 500 TABLET ORAL at 10:13

## 2024-12-31 RX ADMIN — LEVETIRACETAM 500 MG: 500 TABLET, FILM COATED ORAL at 20:11

## 2024-12-31 RX ADMIN — FLUOROMETHOLONE 1 DROP: 1 SOLUTION/ DROPS OPHTHALMIC at 15:35

## 2024-12-31 RX ADMIN — LOSARTAN POTASSIUM 25 MG: 25 TABLET, FILM COATED ORAL at 10:14

## 2024-12-31 RX ADMIN — ENOXAPARIN SODIUM 40 MG: 40 INJECTION SUBCUTANEOUS at 17:49

## 2024-12-31 RX ADMIN — FLUOROMETHOLONE 1 DROP: 1 SOLUTION/ DROPS OPHTHALMIC at 10:12

## 2024-12-31 RX ADMIN — FLUTICASONE PROPIONATE 1 SPRAY: 50 SPRAY, METERED NASAL at 10:12

## 2024-12-31 RX ADMIN — DULOXETINE HYDROCHLORIDE 30 MG: 60 CAPSULE, DELAYED RELEASE ORAL at 10:13

## 2024-12-31 RX ADMIN — HYDROCODONE BITARTRATE AND ACETAMINOPHEN 2 TABLET: 5; 325 TABLET ORAL at 22:24

## 2024-12-31 RX ADMIN — FENOFIBRATE 160 MG: 160 TABLET ORAL at 10:14

## 2024-12-31 RX ADMIN — METOPROLOL SUCCINATE 25 MG: 25 TABLET, EXTENDED RELEASE ORAL at 10:14

## 2024-12-31 RX ADMIN — DULOXETINE HYDROCHLORIDE 60 MG: 60 CAPSULE, DELAYED RELEASE ORAL at 10:15

## 2024-12-31 RX ADMIN — TAMSULOSIN HYDROCHLORIDE 0.4 MG: 0.4 CAPSULE ORAL at 10:13

## 2024-12-31 RX ADMIN — OXYCODONE HYDROCHLORIDE AND ACETAMINOPHEN 1000 MG: 500 TABLET ORAL at 10:14

## 2024-12-31 RX ADMIN — LEVETIRACETAM 500 MG: 500 TABLET, FILM COATED ORAL at 10:14

## 2024-12-31 RX ADMIN — FLUOROMETHOLONE 1 DROP: 1 SOLUTION/ DROPS OPHTHALMIC at 20:11

## 2024-12-31 RX ADMIN — POLYETHYLENE GLYCOL 3350 17 G: 17 POWDER, FOR SOLUTION ORAL at 10:12

## 2024-12-31 ASSESSMENT — PAIN DESCRIPTION - LOCATION
LOCATION: HIP
LOCATION: ARM
LOCATION: GENERALIZED

## 2024-12-31 ASSESSMENT — PAIN SCALES - GENERAL
PAINLEVEL_OUTOF10: 3
PAINLEVEL_OUTOF10: 3
PAINLEVEL_OUTOF10: 4
PAINLEVEL_OUTOF10: 0 - NO PAIN
PAINLEVEL_OUTOF10: 0 - NO PAIN
PAINLEVEL_OUTOF10: 9
PAINLEVEL_OUTOF10: 10 - WORST POSSIBLE PAIN
PAINLEVEL_OUTOF10: 9

## 2024-12-31 ASSESSMENT — PAIN DESCRIPTION - DESCRIPTORS
DESCRIPTORS: ACHING
DESCRIPTORS: ACHING

## 2024-12-31 ASSESSMENT — COGNITIVE AND FUNCTIONAL STATUS - GENERAL
MOVING TO AND FROM BED TO CHAIR: A LOT
PERSONAL GROOMING: A LITTLE
TOILETING: A LOT
MOBILITY SCORE: 15
HELP NEEDED FOR BATHING: A LOT
DAILY ACTIVITIY SCORE: 16
STANDING UP FROM CHAIR USING ARMS: A LOT
CLIMB 3 TO 5 STEPS WITH RAILING: TOTAL
WALKING IN HOSPITAL ROOM: A LOT
DRESSING REGULAR UPPER BODY CLOTHING: A LITTLE
DRESSING REGULAR LOWER BODY CLOTHING: A LITTLE
EATING MEALS: A LITTLE

## 2024-12-31 ASSESSMENT — PAIN - FUNCTIONAL ASSESSMENT
PAIN_FUNCTIONAL_ASSESSMENT: 0-10

## 2024-12-31 ASSESSMENT — PAIN DESCRIPTION - ORIENTATION
ORIENTATION: LEFT
ORIENTATION: LEFT

## 2024-12-31 NOTE — CONSULTS
"Nutrition Initial Assessment:   Nutrition Assessment    Reason for Assessment: Admission nursing screening    Patient is a 64 y.o. male on day 2presenting with Generalized weakness.        Nutrition History:  Energy Intake: Fair 50-75 %  Food and Nutrient History: pts wife at beside reports that patient recently was told his blood sugars were high so pt has been watching his carbohydrate intake and limiting his portion sizes. pts wife admits pt really trys to avoid carbohydrates as much as possible.  Food Allergies/Intolerances:  None  GI Symptoms: None  Oral Problems: Swallowing difficulty at times wife reports patient has had issues since having a trach placed which caused some scaring. Pt has issues with \"crumby\" foods sucks as cookies and breads. Pt does cough at times when drinking water.    .     Wound Type: intact (nursing/wound notes provide further details)    Anthropometrics:  Height: 167.6 cm (5' 5.98\")   Weight: 82.7 kg (182 lb 5.1 oz)   BMI (Calculated): 29.44             Weight History:   Wt Readings from Last 10 Encounters:   12/29/24 82.7 kg (182 lb 5.1 oz)   12/06/24 88 kg (194 lb)   09/30/24 87.1 kg (192 lb)   09/17/24 87.1 kg (192 lb)   07/15/24 86.2 kg (190 lb)   05/14/24 86.2 kg (190 lb)   05/09/24 85.7 kg (189 lb)   04/07/24 85.7 kg (189 lb)   03/29/24 92.1 kg (203 lb)   02/06/24 85.7 kg (189 lb)         Weight Change %:  Weight History / % Weight Change: per emr, pt with 21lb weight loss over the last 8 months. pt has decreased po intake over the last month which has caused some weight loss as well. overall, 10% weight loss in 8 months.  Significant Weight Loss: No    Nutrition Focused Physical Exam Findings:  defer: d/t patient sleeping  Subcutaneous Fat Loss:      Muscle Wasting:     Edema:     Physical Findings:       Nutrition Significant Labs:  CBC Trend:   Results from last 7 days   Lab Units 12/30/24  0425 12/29/24  1423   WBC AUTO x10*3/uL 6.6 7.8   RBC AUTO x10*6/uL 5.17 5.46 "   HEMOGLOBIN g/dL 15.3 16.3   HEMATOCRIT % 47.4 49.4   MCV fL 92 91   PLATELETS AUTO x10*3/uL 247 291    , BMP Trend:   Results from last 7 days   Lab Units 12/31/24  0412 12/30/24  0425 12/29/24  1423   GLUCOSE mg/dL 192* 167* 141*   CALCIUM mg/dL 10.0 10.0 10.5*   SODIUM mmol/L 139 139 140   POTASSIUM mmol/L 3.3* 3.2* 3.5   CO2 mmol/L 23 25 26   CHLORIDE mmol/L 104 103 101   BUN mg/dL 17 11 12   CREATININE mg/dL 0.75 0.68 0.84    , A1C:  Lab Results   Component Value Date    HGBA1C 8.5 (H) 09/17/2024       Nutrition Specific Medications:  acetaminophen, 975 mg, oral, q12h  ascorbic acid, 1,000 mg, oral, Daily  cholecalciferol, 2,000 Units, oral, Daily  DULoxetine, 30 mg, oral, Daily  DULoxetine, 60 mg, oral, Daily  enoxaparin, 40 mg, subcutaneous, q24h  fenofibrate, 160 mg, oral, Daily  fluorometholone, 1 drop, Right Eye, TID  fluticasone, 1 spray, Each Nostril, Daily  levETIRAcetam, 500 mg, oral, BID  losartan, 25 mg, oral, Daily  metoprolol succinate XL, 25 mg, oral, Daily  modafinil, 200 mg, oral, Daily  polyethylene glycol, 17 g, oral, Daily  potassium chloride CR, 40 mEq, oral, Daily  QUEtiapine, 25 mg, oral, TID  tamsulosin, 0.4 mg, oral, Daily         I/O:    ;      Dietary Orders (From admission, onward)       Start     Ordered    12/29/24 1844  May Participate in Room Service  ( ROOM SERVICE MAY PARTICIPATE)  Once        Question:  .  Answer:  Yes    12/29/24 1843    12/29/24 1739  Adult diet Regular, Consistent Carb; CCD 75 gm/meal  Diet effective now        Question Answer Comment   Diet type Regular    Diet type Consistent Carb    Carb diet selection: CCD 75 gm/meal        12/29/24 1744                     Estimated Needs:   Total Energy Estimated Needs (kCal): 1560 kCal  Method for Estimating Needs: MSJ  Total Protein Estimated Needs (g): 83 g  Method for Estimating Needs: 1g/kg  Total Fluid Estimated Needs (mL): 1560 mL  Method for Estimating Needs: 1ml/kcal        Nutrition Diagnosis         Nutrition Diagnosis  Patient has Nutrition Diagnosis: Yes  Nutrition Diagnosis 1: Food and nutrition related knowledge deficit  Related to (1): Diabetes  As Evidenced by (1): food restriction       Nutrition Interventions/Recommendations         Nutrition Prescription:  Individualized Nutrition Prescription Provided for : meals and snacks to provide pt with adequate calories and protein        Nutrition Interventions:   Interventions: Meals and snacks  Meals and Snacks: General healthful diet, Carbohydrate-modified diet    Collaboration and Referral of Nutrition Care: Collaboration by nutrition professional with other providers  Goal: Attend Care rounds    Nutrition Education:  discussed carbohydrate intake with wife at bedside and emphasized eating carbohydrates and protein at every meal. Limiting simple sugars .          Nutrition Monitoring and Evaluation   Food/Nutrient Related History Monitoring  Monitoring and Evaluation Plan: Energy intake  Criteria: pt to tolerate >50% of meals and supplements              Nutrition Focused Physical Findings  Other: Evaluated nutrition intervention as compared to nutrition goal(s) and estimated nutrient need criteria.       Follow Up:     Time Spent (min): 60 minutes  Nutrition Follow-Up Needed?: Dietitian to reassess per policy  Follow up Comment: 1/6-1/8  Time Spent (min): 60 minutes      12/31/24 at 12:52 PM - AMARILYS MENCHACA RDN, LD

## 2024-12-31 NOTE — PROGRESS NOTES
Physical Therapy                 Therapy Communication Note    Patient Name: Kenneth Valenzuela  MRN: [id number]  Department: [facility]  Room: [address] [place] [address]  Today's Date: 12/31/2024     Discipline: Physical Therapy    PT Missed Visit: Yes     Missed Visit Reason: Missed Visit Reason: Patient sleeping (Pt. did wake briefly but could not stay awake for session.Will attempt to see pt. the next day.)    Missed Time: Attempt

## 2024-12-31 NOTE — CARE PLAN
Problem: Skin  Goal: Decreased wound size/increased tissue granulation at next dressing change  Outcome: Progressing  Flowsheets (Taken 12/31/2024 0801)  Decreased wound size/increased tissue granulation at next dressing change: Promote sleep for wound healing  Goal: Participates in plan/prevention/treatment measures  Outcome: Progressing  Flowsheets (Taken 12/31/2024 0801)  Participates in plan/prevention/treatment measures: Increase activity/out of bed for meals  Goal: Prevent/manage excess moisture  Outcome: Progressing  Flowsheets (Taken 12/31/2024 0801)  Prevent/manage excess moisture: Moisturize dry skin  Goal: Prevent/minimize sheer/friction injuries  Outcome: Progressing  Flowsheets (Taken 12/31/2024 0801)  Prevent/minimize sheer/friction injuries: Use pull sheet  Goal: Promote/optimize nutrition  Outcome: Progressing  Flowsheets (Taken 12/31/2024 0801)  Promote/optimize nutrition: Consume > 50% meals/supplements  Goal: Promote skin healing  Outcome: Progressing  Flowsheets (Taken 12/31/2024 0801)  Promote skin healing: Turn/reposition every 2 hours/use positioning/transfer devices     Problem: Diabetes  Goal: Achieve decreasing blood glucose levels by end of shift  Outcome: Progressing  Goal: Increase stability of blood glucose readings by end of shift  Outcome: Progressing  Goal: Decrease in ketones present in urine by end of shift  Outcome: Progressing  Goal: Maintain electrolyte levels within acceptable range throughout shift  Outcome: Progressing  Goal: Maintain glucose levels >70mg/dl to <250mg/dl throughout shift  Outcome: Progressing  Goal: No changes in neurological exam by end of shift  Outcome: Progressing  Goal: Learn about and adhere to nutrition recommendations by end of shift  Outcome: Progressing  Goal: Vital signs within normal range for age by end of shift  Outcome: Progressing  Goal: Increase self care and/or family involovement by end of shift  Outcome: Progressing  Goal: Receive DSME  education by end of shift  Outcome: Progressing     Problem: Pain - Adult  Goal: Verbalizes/displays adequate comfort level or baseline comfort level  Outcome: Progressing     Problem: Safety - Adult  Goal: Free from fall injury  Outcome: Progressing     Problem: Discharge Planning  Goal: Discharge to home or other facility with appropriate resources  Outcome: Progressing     Problem: Chronic Conditions and Co-morbidities  Goal: Patient's chronic conditions and co-morbidity symptoms are monitored and maintained or improved  Outcome: Progressing     Problem: Pain  Goal: Takes deep breaths with improved pain control throughout the shift  Outcome: Progressing  Goal: Turns in bed with improved pain control throughout the shift  Outcome: Progressing  Goal: Walks with improved pain control throughout the shift  Outcome: Progressing  Goal: Performs ADL's with improved pain control throughout shift  Outcome: Progressing  Goal: Participates in PT with improved pain control throughout the shift  Outcome: Progressing  Goal: Free from opioid side effects throughout the shift  Outcome: Progressing  Goal: Free from acute confusion related to pain meds throughout the shift  Outcome: Progressing     Problem: Fall/Injury  Goal: Not fall by end of shift  Outcome: Progressing  Goal: Be free from injury by end of the shift  Outcome: Progressing  Goal: Verbalize understanding of personal risk factors for fall in the hospital  Outcome: Progressing  Goal: Verbalize understanding of risk factor reduction measures to prevent injury from fall in the home  Outcome: Progressing  Goal: Use assistive devices by end of the shift  Outcome: Progressing  Goal: Pace activities to prevent fatigue by end of the shift  Outcome: Progressing   The patient's goals for the shift include gain strength    The clinical goals for the shift include no falls    Over the shift, the patient did not make progress toward the following goals. Barriers to progression  include . Recommendations to address these barriers include .

## 2024-12-31 NOTE — CARE PLAN
The patient's goals for the shift include gain strength    The clinical goals for the shift include no falls

## 2024-12-31 NOTE — PROGRESS NOTES
Care Transitions: Patient reviewed in care round meeting this AM and is awaiting SNF placement. Met with patient and wife at bedside. Reviewed SNF referral status with them. [facility] and [facility] unable to accept due to no bed available. [facility] is out of network for [employer] [facility]. Wife states patient is switching insurance and will have a new insurance starting 1/1/25. She thinks the new insurance is [facility] [employer]. She has the new card at home and will provide card later today. She would like to keep patient in [place]. Will check with [facility] and new insurance once card received. Tiffany Segovia RN/TCC    [phone] Wife provided patients new insurance card. Copied and given to unit secretary to upload in patient chart. Copy placed in physical chart. New insurance information sent to [facility] via [employer] for review. Patient will need a precert if accepted at [facility]. New insurance effective on 1/1/25 however insurance offices are closed for holiday. Will submit for precert Thursday once accepted at a SNF. Tiffany Segovia RN/TCC

## 2024-12-31 NOTE — PROGRESS NOTES
Hospital Medicine Progress Note     Subjective   Patient examined at bedside.  Patient alert and oriented to self only.  Patient appears very agitated during conversation.  He is able to have a conversation but his information is scattered and he tends to ramble on.  Unable provide any good HPI but is in no acute distress at this time    Objective     General Appearance: AAO x 1, not in acute distress  Skin: skin color, texture, turgor normal; no suspicious rashes or lesions  Eyes : PERRL, EOM's intact, conjunctiva pink  ENT: no oral thrush, no pharyngeal erythema or exudates  Neck: no JVD, no lymphadenopathy  Respiratory: lungs CTA, no rhonchi, wheezing, or crackles  Heart: RRR without murmur, gallop, or rubs, no ectopy  Abdomen: Nondistended, positive bowel sounds, soft,  nontender  Extremities: no edema, ROM x 4 extremities  Peripheral pulses: normal and present x 4 extremities  Neuro: alert, coherent and conversant, no focal motor deficits  Confused    Medications  Scheduled medications  acetaminophen, 975 mg, oral, q12h  ascorbic acid, 1,000 mg, oral, Daily  cholecalciferol, 2,000 Units, oral, Daily  DULoxetine, 30 mg, oral, Daily  DULoxetine, 60 mg, oral, Daily  enoxaparin, 40 mg, subcutaneous, q24h  fenofibrate, 160 mg, oral, Daily  fluorometholone, 1 drop, Right Eye, TID  fluticasone, 1 spray, Each Nostril, Daily  levETIRAcetam, 500 mg, oral, BID  losartan, 25 mg, oral, Daily  metoprolol succinate XL, 25 mg, oral, Daily  modafinil, 200 mg, oral, Daily  naproxen, 500 mg, oral, BID  polyethylene glycol, 17 g, oral, Daily  potassium chloride CR, 40 mEq, oral, Daily  QUEtiapine, 25 mg, oral, TID  tamsulosin, 0.4 mg, oral, Daily    PRN medications  PRN medications: HYDROcodone-acetaminophen, metoprolol, morphine, ondansetron ODT **OR** [DISCONTINUED] ondansetron    Last Recorded Vitals  /85 (BP Location: Left arm, Patient Position: Lying)   Pulse (!) 115   Temp 36.1 °C (97 °F) (Temporal)   Resp 20   Wt  82.7 kg (182 lb 5.1 oz)   SpO2 96%     Admission Weight  Weight: 86.2 kg (190 lb) (12/29/24 1118)    Daily Weight  12/29/24 : 82.7 kg (182 lb 5.1 oz)    Lab Results  Results for orders placed or performed during the hospital encounter of 12/29/24 (from the past 24 hours)   POCT GLUCOSE   Result Value Ref Range    POCT Glucose 192 (H) 74 - 99 mg/dL   POCT GLUCOSE   Result Value Ref Range    POCT Glucose 261 (H) 74 - 99 mg/dL   POCT GLUCOSE   Result Value Ref Range    POCT Glucose 333 (H) 74 - 99 mg/dL   Basic Metabolic Panel   Result Value Ref Range    Glucose 192 (H) 74 - 99 mg/dL    Sodium 139 136 - 145 mmol/L    Potassium 3.3 (L) 3.5 - 5.3 mmol/L    Chloride 104 98 - 107 mmol/L    Bicarbonate 23 21 - 32 mmol/L    Anion Gap 15 10 - 20 mmol/L    Urea Nitrogen 17 6 - 23 mg/dL    Creatinine 0.75 0.50 - 1.30 mg/dL    eGFR >90 >60 mL/min/1.73m*2    Calcium 10.0 8.6 - 10.3 mg/dL   Lavender Top   Result Value Ref Range    Extra Tube Hold for add-ons.    SST TOP   Result Value Ref Range    Extra Tube Hold for add-ons.    POCT GLUCOSE   Result Value Ref Range    POCT Glucose 192 (H) 74 - 99 mg/dL     Image Results  CT pelvis wo IV contrast  Narrative: Interpreted By:  Wilberto Gong,   STUDY:  CT PELVIS WO IV CONTRAST;  12/29/2024 12:11 pm      INDICATION:  Signs/Symptoms:fall.          COMPARISON:  CT chest, abdomen and pelvis with contrast 27 April 2024      ACCESSION NUMBER(S):  [id number]      ORDERING CLINICIAN:  RACHAEL VILLATORO      TECHNIQUE:  CT pelvis from the pelvic inlet through the symphysis pubis without  IV contrast. Coronal and sagittal reformatted images created,  reviewed and saved      FINDINGS:  No acute fracture or hip dislocation or any other acute traumatic  injury evident      Old, healed right inferior pubic ramus fracture      Severe degenerative changes of left hip with subchondral cystic  change on both sides of the joint similar to prior CT      Included lower left kidney redemonstrates portions  of two cysts and a  nonobstructing stone. No acute unexpected soft tissue findings  including the field-of-view      Impression: No acute fracture, dislocation, other traumatic injury or any other  acute process otherwise      Old, healed right inferior pubic ramus fracture      MACRO:  None      Signed by: Wilberto Gong 12/29/2024 12:40 PM  Dictation workstation:   [id number]  CT head wo IV contrast, CT cervical spine wo IV contrast  Narrative: Interpreted By:  Wilberto Gong,   STUDY:  CT HEAD WO IV CONTRAST; CT CERVICAL SPINE WO IV CONTRAST;  12/29/2024  12:11 pm      INDICATION:  Signs/Symptoms:fall.          COMPARISON:  CT brain and cervical spine both from 7 April 2024      ACCESSION NUMBER(S):  [id number]; [id number]      ORDERING CLINICIAN:  RACHAEL VILLATORO      TECHNIQUE:  CT of the brain from the skull vertex to the skull base, without  intravenous contrast      CT cervical spine from the craniocervical junction through the  cervicothoracic junction without IV contrast, including sagittal and  coronal reformatted images      FINDINGS:  CT BRAIN      TRAUMA-RELATED      Brain Injury (BIG) guidelines CT values:      Skull fracture: No  SDH (subdural hematoma): None detected  EDH (epidural hematoma): None detected  IPH (intraparenchymal hemorrhage): None detected  SAH (subarachnoid hemorrhage): None detected  IVH (intraventricular hemorrhage): None detected      Reference:  Aba DUTTON, Fawn RS, Nelda M, et al. The BIG (brain injury  guidelines) project: defining the management of traumatic brain  injury by acute care surgeons. J Trauma Acute Care Surg.  2014;76:383e696.      OTHER      ACUTE INTRACRANIAL MASS EFFECT:  Negative      CT EVIDENCE OF ACUTE / SUBACUTE TERRITORIAL ISCHEMIA:  Negative      VENTRICLES:  Unchanged prominence of bilateral lateral without third  or fourth ventricle enlargement or acute obstructive hydrocephalus      OTHER BRAIN FINDINGS:  No significant interval change to the  CT  appearance of the brain including the degree of atrophy and deep  white matter changes from chronic microvascular disease and large  area of encephalomalacia in left parietotemporal distribution      INCLUDED PARANASAL SINUSES: All clear      INCLUDED MASTOID AIR CELLS: All clear      SKULL:  No lytic or blastic lesion. Right craniotomy      EXTRACRANIAL SOFT TISSUES:  Scalp and occular globes grossly normal  by CT      -------      CT CERVICAL SPINE      COUNTING REFERENCE: Craniocervical junction      CRANIOCERVICAL JUNCTION:  Intact      CERVICAL ALIGNMENT:  Anatomic; no acute traumatic alignment  abnormality such as subluxation      ACUTE FRACTURE: Negative      AGGRESSIVE OSSEOUS LESION: Negative      BONY CANAL AND FORAMINA:  No significant change from 7 April 2024      PARASPINAL SOFT TISSUES:  No large acute hematoma or other acute  posttraumatic finding      OTHER INCLUDED STRUCTURES:  No acute or contributory soft tissue  abnormality in the other cervical and upper thoracic soft tissues      Impression: NO ACUTE INTRACRANIAL PROCESS. SKULL INTACT      NO ACUTE FRACTURE OR SUBLUXATION IN THE CERVICAL SPINE      THIS REPORT SERVES AS THE DIAGNOSTIC INTERPRETATION FOR TWO EXAMS  PERFORMED CONCURRENTLY: CT BRAIN WITHOUT IV CONTRAST AND CT CERVICAL  SPINE WITHOUT IV CONTRAST      MACRO:  None      Signed by: Wilberto Gong 12/29/2024 12:35 PM  Dictation workstation:   [id number]  XR shoulder left 2+ views  Narrative: Interpreted By:  Geronimo Krishnamurthy,   STUDY:  XR SHOULDER LEFT 2+ VIEWS;  12/29/2024 11:57 am      INDICATION:  Signs/Symptoms:fall.      COMPARISON:  Prior exam from 09/08/2022      ACCESSION NUMBER(S):  [id number]      ORDERING CLINICIAN:  RACHAEL VILLATORO      TECHNIQUE:  3 views  of the  left shoulder were obtained.      FINDINGS:  Mild-to-moderate AC joint hypertrophy with spur formation. Moderate  glenohumeral joint space loss with spur formation. There is a  calcified loose body overlying the  scapular neck. No lytic or blastic  destructive bone lesion. No acute fracture or dislocation.  No opaque soft tissue foreign body.  No periosteal reaction or erosion.      Impression: DJD throughout the left shoulder region as described. No acute  fracture or dislocation based on this exam.      MACRO:  None      Signed by: Geronimo Krishnamurthy 12/29/2024 12:09 PM  Dictation workstation:   [id number]  XR knee left 1-2 views  Narrative: Interpreted By:  Geronimo Krishnamurthy,   STUDY:  XR KNEE LEFT 1-2 VIEWS;  12/29/2024 11:57 am      INDICATION:  Signs/Symptoms:fall.      COMPARISON:  Prior exam from 02/11/2019      ACCESSION NUMBER(S):  [id number]      ORDERING CLINICIAN:  RACHAEL VILLATORO      TECHNIQUE:  AP and lateral views  of the  left knee were obtained.      FINDINGS:  Previous left knee arthroplasty. Increase in soft tissue  calcification in the suprapatellar bursa. There is also fluid in the  bursa. Patellar component is radiolucent. Femoral and tibial  components are well seated and in good alignment. No lytic or blastic  destructive bone lesion. No acute fracture or dislocation.  No opaque soft tissue foreign body.  No periosteal reaction or erosion.      Impression: Intact left knee arthroplasty. No acute fracture or dislocation.      Suprapatellar effusion.      Increase in nonspecific calcification within the suprapatellar bursa.      MACRO:  None      Signed by: Geronimo Krishnamurthy 12/29/2024 12:08 PM  Dictation workstation:   [id number]    Assessment      Kenneth Valenzuela is a 64 y.o. male with past medical history of ataxia, BPH, bilateral renal stones, chronic pain syndrome, type 2 diabetes, depression, frequent falls, hyperlipidemia, hypertension, subdural hematoma with right craniotomy for evacuation of subdural hematoma in 2023, behavioral issues, traumatic brain injury at the age of 16 from a pole vaulting accident, and seizures presented to [facility] ED with complaints of multiple falls at home. CT head and  C-spine show no acute fracture or subluxation. CT pelvis without IV contrast negative for fracture or dislocation. X-ray to the left knee shows intact arthroplasty without fracture or dislocation. X-ray to left shoulder shows DJD without acute fracture. Patient will be admitted to medical surgical floor with working diagnosis of frequent falls and need for placement.     PLAN:    Physical deconditioning  -PT/OT [facility] 13 respectively   -will dc to SNF, SW attempting to find one, no acceptance at this time    History of TBI/s/p craniotomy  -Complicated by seizures  -Keppra 500 mg p.o. twice daily  -Because the patient's history of TBI he does have agitation and behaviors.  Upon arrival today patient was already on Zyprexa.  Patient was on Keppra, Seroquel, Cymbalta, and Zyprexa I did just continue the Zyprexa as it was just started a couple of days ago I reviewed this with the pharmacy and its high risk to continue Zyprexa with Seroquel as this can prolong the QT and cause cardiac arrhythmias and lethargy    Chronic pain syndrome  -Tylenol scheduled 975 twice daily  -DC naproxen this will increase his blood pressure    Depression  - Cymbalta 60 mg in the a.m. and 30 mg in the p.m.   -seroquel 25mg po tid     BPH  -tamsulosin 0.4 mg p.o. daily     Essential hypertension  -Losartan 25 mg p.o. daily  -Metoprolol 25 mg daily, patient has been tachycardic up to 125.  I will increase the dose to 25 mg twice daily and monitor heart rate accordingly   -Telemetry monitoring    Hypokalemia  -potassium low 2 days in row, will make 40meq daily     Hypertryglycerdiemia  -fenofibrate 160mg po daily     Bowel regimen: miralax   Dvt prophylaxis: lovenox subcu   Dispo: Medically ready for discharge, weakness on SNF      Naida Larson, PAPI, APRN, AGNP-BC  Hospitalist Nurse Practitioner

## 2025-01-01 LAB
ALBUMIN SERPL BCP-MCNC: 4.1 G/DL (ref 3.4–5)
ALP SERPL-CCNC: 68 U/L (ref 33–136)
ALT SERPL W P-5'-P-CCNC: 12 U/L (ref 10–52)
ANION GAP SERPL CALC-SCNC: 14 MMOL/L (ref 10–20)
AST SERPL W P-5'-P-CCNC: 17 U/L (ref 9–39)
BILIRUB SERPL-MCNC: 0.4 MG/DL (ref 0–1.2)
BUN SERPL-MCNC: 22 MG/DL (ref 6–23)
CALCIUM SERPL-MCNC: 10 MG/DL (ref 8.6–10.3)
CHLORIDE SERPL-SCNC: 106 MMOL/L (ref 98–107)
CO2 SERPL-SCNC: 21 MMOL/L (ref 21–32)
CREAT SERPL-MCNC: 0.86 MG/DL (ref 0.5–1.3)
EGFRCR SERPLBLD CKD-EPI 2021: >90 ML/MIN/1.73M*2
ERYTHROCYTE [DISTWIDTH] IN BLOOD BY AUTOMATED COUNT: 13.5 % (ref 11.5–14.5)
GLUCOSE BLD MANUAL STRIP-MCNC: 191 MG/DL (ref 74–99)
GLUCOSE BLD MANUAL STRIP-MCNC: 203 MG/DL (ref 74–99)
GLUCOSE BLD MANUAL STRIP-MCNC: 215 MG/DL (ref 74–99)
GLUCOSE BLD MANUAL STRIP-MCNC: 306 MG/DL (ref 74–99)
GLUCOSE SERPL-MCNC: 207 MG/DL (ref 74–99)
HCT VFR BLD AUTO: 45.3 % (ref 41–52)
HGB BLD-MCNC: 14.4 G/DL (ref 13.5–17.5)
MCH RBC QN AUTO: 29.9 PG (ref 26–34)
MCHC RBC AUTO-ENTMCNC: 31.8 G/DL (ref 32–36)
MCV RBC AUTO: 94 FL (ref 80–100)
NRBC BLD-RTO: 0 /100 WBCS (ref 0–0)
PLATELET # BLD AUTO: 226 X10*3/UL (ref 150–450)
POTASSIUM SERPL-SCNC: 3.9 MMOL/L (ref 3.5–5.3)
PROT SERPL-MCNC: 6.7 G/DL (ref 6.4–8.2)
RBC # BLD AUTO: 4.82 X10*6/UL (ref 4.5–5.9)
SODIUM SERPL-SCNC: 137 MMOL/L (ref 136–145)
WBC # BLD AUTO: 4.3 X10*3/UL (ref 4.4–11.3)

## 2025-01-01 PROCEDURE — 36415 COLL VENOUS BLD VENIPUNCTURE: CPT | Performed by: NURSE PRACTITIONER

## 2025-01-01 PROCEDURE — 99232 SBSQ HOSP IP/OBS MODERATE 35: CPT | Performed by: NURSE PRACTITIONER

## 2025-01-01 PROCEDURE — 84075 ASSAY ALKALINE PHOSPHATASE: CPT | Performed by: NURSE PRACTITIONER

## 2025-01-01 PROCEDURE — 85027 COMPLETE CBC AUTOMATED: CPT | Performed by: NURSE PRACTITIONER

## 2025-01-01 PROCEDURE — 2500000002 HC RX 250 W HCPCS SELF ADMINISTERED DRUGS (ALT 637 FOR MEDICARE OP, ALT 636 FOR OP/ED): Performed by: NURSE PRACTITIONER

## 2025-01-01 PROCEDURE — 2500000004 HC RX 250 GENERAL PHARMACY W/ HCPCS (ALT 636 FOR OP/ED): Performed by: PHYSICIAN ASSISTANT

## 2025-01-01 PROCEDURE — 2500000001 HC RX 250 WO HCPCS SELF ADMINISTERED DRUGS (ALT 637 FOR MEDICARE OP)

## 2025-01-01 PROCEDURE — 82947 ASSAY GLUCOSE BLOOD QUANT: CPT

## 2025-01-01 PROCEDURE — 2500000002 HC RX 250 W HCPCS SELF ADMINISTERED DRUGS (ALT 637 FOR MEDICARE OP, ALT 636 FOR OP/ED)

## 2025-01-01 PROCEDURE — 1200000002 HC GENERAL ROOM WITH TELEMETRY DAILY

## 2025-01-01 PROCEDURE — 2500000004 HC RX 250 GENERAL PHARMACY W/ HCPCS (ALT 636 FOR OP/ED)

## 2025-01-01 RX ADMIN — ACETAMINOPHEN 975 MG: 325 TABLET ORAL at 10:25

## 2025-01-01 RX ADMIN — LEVETIRACETAM 500 MG: 500 TABLET, FILM COATED ORAL at 20:52

## 2025-01-01 RX ADMIN — ACETAMINOPHEN 975 MG: 325 TABLET ORAL at 20:52

## 2025-01-01 RX ADMIN — ENOXAPARIN SODIUM 40 MG: 40 INJECTION SUBCUTANEOUS at 17:32

## 2025-01-01 RX ADMIN — QUETIAPINE FUMARATE 25 MG: 25 TABLET ORAL at 10:53

## 2025-01-01 RX ADMIN — Medication 2000 UNITS: at 10:26

## 2025-01-01 RX ADMIN — MORPHINE SULFATE 2 MG: 2 INJECTION, SOLUTION INTRAMUSCULAR; INTRAVENOUS at 14:45

## 2025-01-01 RX ADMIN — FLUOROMETHOLONE 1 DROP: 1 SOLUTION/ DROPS OPHTHALMIC at 20:52

## 2025-01-01 RX ADMIN — MODAFINIL 200 MG: 100 TABLET ORAL at 10:52

## 2025-01-01 RX ADMIN — LOSARTAN POTASSIUM 25 MG: 25 TABLET, FILM COATED ORAL at 10:25

## 2025-01-01 RX ADMIN — DULOXETINE HYDROCHLORIDE 60 MG: 60 CAPSULE, DELAYED RELEASE ORAL at 10:26

## 2025-01-01 RX ADMIN — MORPHINE SULFATE 2 MG: 2 INJECTION, SOLUTION INTRAMUSCULAR; INTRAVENOUS at 20:52

## 2025-01-01 RX ADMIN — LEVETIRACETAM 500 MG: 500 TABLET, FILM COATED ORAL at 10:25

## 2025-01-01 RX ADMIN — TAMSULOSIN HYDROCHLORIDE 0.4 MG: 0.4 CAPSULE ORAL at 10:53

## 2025-01-01 RX ADMIN — FLUOROMETHOLONE 1 DROP: 1 SOLUTION/ DROPS OPHTHALMIC at 14:44

## 2025-01-01 RX ADMIN — FLUTICASONE PROPIONATE 1 SPRAY: 50 SPRAY, METERED NASAL at 10:29

## 2025-01-01 RX ADMIN — DULOXETINE HYDROCHLORIDE 30 MG: 60 CAPSULE, DELAYED RELEASE ORAL at 10:24

## 2025-01-01 RX ADMIN — MORPHINE SULFATE 2 MG: 2 INJECTION, SOLUTION INTRAMUSCULAR; INTRAVENOUS at 10:23

## 2025-01-01 RX ADMIN — QUETIAPINE FUMARATE 25 MG: 25 TABLET ORAL at 14:44

## 2025-01-01 RX ADMIN — FENOFIBRATE 160 MG: 160 TABLET ORAL at 10:26

## 2025-01-01 RX ADMIN — METOPROLOL SUCCINATE 25 MG: 25 TABLET, EXTENDED RELEASE ORAL at 10:26

## 2025-01-01 RX ADMIN — FLUOROMETHOLONE 1 DROP: 1 SOLUTION/ DROPS OPHTHALMIC at 10:29

## 2025-01-01 RX ADMIN — HYDROCODONE BITARTRATE AND ACETAMINOPHEN 2 TABLET: 5; 325 TABLET ORAL at 17:44

## 2025-01-01 RX ADMIN — QUETIAPINE FUMARATE 25 MG: 25 TABLET ORAL at 20:52

## 2025-01-01 RX ADMIN — POTASSIUM CHLORIDE 40 MEQ: 1500 TABLET, EXTENDED RELEASE ORAL at 10:52

## 2025-01-01 RX ADMIN — OXYCODONE HYDROCHLORIDE AND ACETAMINOPHEN 1000 MG: 500 TABLET ORAL at 10:25

## 2025-01-01 ASSESSMENT — COGNITIVE AND FUNCTIONAL STATUS - GENERAL
PERSONAL GROOMING: A LITTLE
MOVING TO AND FROM BED TO CHAIR: A LOT
TURNING FROM BACK TO SIDE WHILE IN FLAT BAD: A LITTLE
STANDING UP FROM CHAIR USING ARMS: A LOT
DRESSING REGULAR UPPER BODY CLOTHING: A LITTLE
MOBILITY SCORE: 13
DAILY ACTIVITIY SCORE: 19
MOVING TO AND FROM BED TO CHAIR: A LOT
TURNING FROM BACK TO SIDE WHILE IN FLAT BAD: A LITTLE
DRESSING REGULAR LOWER BODY CLOTHING: A LITTLE
DRESSING REGULAR LOWER BODY CLOTHING: A LITTLE
TOILETING: A LITTLE
HELP NEEDED FOR BATHING: A LITTLE
MOBILITY SCORE: 15
WALKING IN HOSPITAL ROOM: A LOT
HELP NEEDED FOR BATHING: A LOT
PERSONAL GROOMING: A LITTLE
WALKING IN HOSPITAL ROOM: A LOT
MOVING FROM LYING ON BACK TO SITTING ON SIDE OF FLAT BED WITH BEDRAILS: A LITTLE
CLIMB 3 TO 5 STEPS WITH RAILING: A LOT
CLIMB 3 TO 5 STEPS WITH RAILING: TOTAL
STANDING UP FROM CHAIR USING ARMS: A LOT
TOILETING: A LITTLE
DAILY ACTIVITIY SCORE: 18
DRESSING REGULAR UPPER BODY CLOTHING: A LITTLE

## 2025-01-01 ASSESSMENT — PAIN SCALES - GENERAL
PAINLEVEL_OUTOF10: 10 - WORST POSSIBLE PAIN
PAINLEVEL_OUTOF10: 10 - WORST POSSIBLE PAIN
PAINLEVEL_OUTOF10: 8
PAINLEVEL_OUTOF10: 10 - WORST POSSIBLE PAIN
PAINLEVEL_OUTOF10: 0 - NO PAIN

## 2025-01-01 ASSESSMENT — PAIN DESCRIPTION - LOCATION: LOCATION: OTHER (COMMENT)

## 2025-01-01 ASSESSMENT — PAIN - FUNCTIONAL ASSESSMENT: PAIN_FUNCTIONAL_ASSESSMENT: 0-10

## 2025-01-01 NOTE — ASSESSMENT & PLAN NOTE
-PT/OT consult    History of traumatic brain injury/status postcraniotomy  -Continue Keppra, Seroquel, Cymbalta  -Started on Zyprexa.  Wife and patient said that he has anger issues    Chronic pain syndrome  -Tylenol scheduled around-the-clock  -Continue home [place].  Morphine for severe pain    Depression  -Continue Cymbalta    BPH  -Continue tamsulosin

## 2025-01-01 NOTE — ASSESSMENT & PLAN NOTE
-PT/OT consult-appreciate recommendations  -Social work consult to assist with discharge planning

## 2025-01-01 NOTE — ASSESSMENT & PLAN NOTE
History of traumatic brain injury/status postcraniotomy/seizure disorder.  -Continue Keppra, Seroquel, Cymbalta  -Because the patient's history of TBI he does have agitation and behaviors.   Patient on Keppra, Provigil, Seroquel, Cymbalta.    Chronic pain syndrome  -Tylenol scheduled around-the-clock  -Continue home [place].  Morphine for severe pain breakthrough pain.    Depression  -Continue Cymbalta and Seroquel      BPH  -tamsulosin 0.4 mg p.o. daily     Essential hypertension  -Losartan 25 mg p.o. daily  -Metoprolol 25 mg daily, patient has been tachycardic up to 125.  I will increase the dose to 25 mg twice daily and monitor heart rate accordingly   -Telemetry monitoring     Hypokalemia  -potassium low 2 days in row, will make 40meq daily patient's potassium today 3.9     Hypertryglycerdiemia  -fenofibrate 160mg po daily      Bowel regimen: miralax   Dvt prophylaxis: lovenox subcu   Dispo: Medically ready for discharge, awaiting pre-CERT for SNF placement.

## 2025-01-01 NOTE — CARE PLAN
The patient's goals for the shift include gain strength    The clinical goals for the shift include no falls    Over the shift, the patient was assisted to BSC with 2 staff and was very weak.  Pt used call light appropriately and remained free from falls

## 2025-01-01 NOTE — PROGRESS NOTES
Kenneth Valenzuela is a 64 y.o. male on day 3 of admission presenting with Generalized weakness.      Subjective   Patient laying in bed no acute distress.  No adverse events reported overnight.  Patient awaiting SNF placement.       Objective     Last Recorded Vitals  BP (!) 168/98   Pulse 102   Temp 36.7 °C (98.1 °F)   Resp 20   Wt 82.7 kg (182 lb 5.1 oz)   SpO2 97%   Intake/Output last 3 Shifts:    Intake/Output Summary (Last 24 hours) at 1/1/2025 1052  Last data filed at 1/1/2025 0900  Gross per 24 hour   Intake 880 ml   Output 550 ml   Net 330 ml       Admission Weight  Weight: 86.2 kg (190 lb) (12/29/24 1118)    Daily Weight  12/29/24 : 82.7 kg (182 lb 5.1 oz)    Image Results  CT pelvis wo IV contrast  Narrative: Interpreted By:  Wilberto Gong,   STUDY:  CT PELVIS WO IV CONTRAST;  12/29/2024 12:11 pm      INDICATION:  Signs/Symptoms:fall.          COMPARISON:  CT chest, abdomen and pelvis with contrast 27 April 2024      ACCESSION NUMBER(S):  [id number]      ORDERING CLINICIAN:  RACHAEL VILLATORO      TECHNIQUE:  CT pelvis from the pelvic inlet through the symphysis pubis without  IV contrast. Coronal and sagittal reformatted images created,  reviewed and saved      FINDINGS:  No acute fracture or hip dislocation or any other acute traumatic  injury evident      Old, healed right inferior pubic ramus fracture      Severe degenerative changes of left hip with subchondral cystic  change on both sides of the joint similar to prior CT      Included lower left kidney redemonstrates portions of two cysts and a  nonobstructing stone. No acute unexpected soft tissue findings  including the field-of-view      Impression: No acute fracture, dislocation, other traumatic injury or any other  acute process otherwise      Old, healed right inferior pubic ramus fracture      MACRO:  None      Signed by: Wilberto Gong 12/29/2024 12:40 PM  Dictation workstation:   [id number]  CT head wo IV contrast, CT cervical spine wo IV  contrast  Narrative: Interpreted By:  Wilberto Gong,   STUDY:  CT HEAD WO IV CONTRAST; CT CERVICAL SPINE WO IV CONTRAST;  12/29/2024  12:11 pm      INDICATION:  Signs/Symptoms:fall.          COMPARISON:  CT brain and cervical spine both from 7 April 2024      ACCESSION NUMBER(S):  [id number]; [id number]      ORDERING CLINICIAN:  RACHAEL VILLATORO      TECHNIQUE:  CT of the brain from the skull vertex to the skull base, without  intravenous contrast      CT cervical spine from the craniocervical junction through the  cervicothoracic junction without IV contrast, including sagittal and  coronal reformatted images      FINDINGS:  CT BRAIN      TRAUMA-RELATED      Brain Injury (BIG) guidelines CT values:      Skull fracture: No  SDH (subdural hematoma): None detected  EDH (epidural hematoma): None detected  IPH (intraparenchymal hemorrhage): None detected  SAH (subarachnoid hemorrhage): None detected  IVH (intraventricular hemorrhage): None detected      Reference:  Aba DUTTON, Fawn RS, Nelda M, et al. The BIG (brain injury  guidelines) project: defining the management of traumatic brain  injury by acute care surgeons. J Trauma Acute Care Surg.  2014;76:561z348.      OTHER      ACUTE INTRACRANIAL MASS EFFECT:  Negative      CT EVIDENCE OF ACUTE / SUBACUTE TERRITORIAL ISCHEMIA:  Negative      VENTRICLES:  Unchanged prominence of bilateral lateral without third  or fourth ventricle enlargement or acute obstructive hydrocephalus      OTHER BRAIN FINDINGS:  No significant interval change to the CT  appearance of the brain including the degree of atrophy and deep  white matter changes from chronic microvascular disease and large  area of encephalomalacia in left parietotemporal distribution      INCLUDED PARANASAL SINUSES: All clear      INCLUDED MASTOID AIR CELLS: All clear      SKULL:  No lytic or blastic lesion. Right craniotomy      EXTRACRANIAL SOFT TISSUES:  Scalp and occular globes grossly normal  by CT       -------      CT CERVICAL SPINE      COUNTING REFERENCE: Craniocervical junction      CRANIOCERVICAL JUNCTION:  Intact      CERVICAL ALIGNMENT:  Anatomic; no acute traumatic alignment  abnormality such as subluxation      ACUTE FRACTURE: Negative      AGGRESSIVE OSSEOUS LESION: Negative      BONY CANAL AND FORAMINA:  No significant change from 7 April 2024      PARASPINAL SOFT TISSUES:  No large acute hematoma or other acute  posttraumatic finding      OTHER INCLUDED STRUCTURES:  No acute or contributory soft tissue  abnormality in the other cervical and upper thoracic soft tissues      Impression: NO ACUTE INTRACRANIAL PROCESS. SKULL INTACT      NO ACUTE FRACTURE OR SUBLUXATION IN THE CERVICAL SPINE      THIS REPORT SERVES AS THE DIAGNOSTIC INTERPRETATION FOR TWO EXAMS  PERFORMED CONCURRENTLY: CT BRAIN WITHOUT IV CONTRAST AND CT CERVICAL  SPINE WITHOUT IV CONTRAST      MACRO:  None      Signed by: Wilberto Gong 12/29/2024 12:35 PM  Dictation workstation:   [id number]  XR shoulder left 2+ views  Narrative: Interpreted By:  Geronimo Krishnamurthy,   STUDY:  XR SHOULDER LEFT 2+ VIEWS;  12/29/2024 11:57 am      INDICATION:  Signs/Symptoms:fall.      COMPARISON:  Prior exam from 09/08/2022      ACCESSION NUMBER(S):  [id number]      ORDERING CLINICIAN:  RACHAEL VILLATORO      TECHNIQUE:  3 views  of the  left shoulder were obtained.      FINDINGS:  Mild-to-moderate AC joint hypertrophy with spur formation. Moderate  glenohumeral joint space loss with spur formation. There is a  calcified loose body overlying the scapular neck. No lytic or blastic  destructive bone lesion. No acute fracture or dislocation.  No opaque soft tissue foreign body.  No periosteal reaction or erosion.      Impression: DJD throughout the left shoulder region as described. No acute  fracture or dislocation based on this exam.      MACRO:  None      Signed by: Geronimo Krishnamurthy 12/29/2024 12:09 PM  Dictation workstation:   [id number]  XR knee left 1-2  views  Narrative: Interpreted By:  Geronimo Krishnamurthy,   STUDY:  XR KNEE LEFT 1-2 VIEWS;  12/29/2024 11:57 am      INDICATION:  Signs/Symptoms:fall.      COMPARISON:  Prior exam from 02/11/2019      ACCESSION NUMBER(S):  [id number]      ORDERING CLINICIAN:  RACHAEL VILLATORO      TECHNIQUE:  AP and lateral views  of the  left knee were obtained.      FINDINGS:  Previous left knee arthroplasty. Increase in soft tissue  calcification in the suprapatellar bursa. There is also fluid in the  bursa. Patellar component is radiolucent. Femoral and tibial  components are well seated and in good alignment. No lytic or blastic  destructive bone lesion. No acute fracture or dislocation.  No opaque soft tissue foreign body.  No periosteal reaction or erosion.      Impression: Intact left knee arthroplasty. No acute fracture or dislocation.      Suprapatellar effusion.      Increase in nonspecific calcification within the suprapatellar bursa.      MACRO:  None      Signed by: Geronimo Krishnamurthy 12/29/2024 12:08 PM  Dictation workstation:   [id number]      Physical Exam  General Appearance: AAO x 1, not in acute distress  Skin: skin color, texture, turgor normal; no suspicious rashes or lesions  Eyes : PERRL, EOM's intact, conjunctiva pink  ENT: no oral thrush, no pharyngeal erythema or exudates  Neck: no JVD, no lymphadenopathy  Respiratory: lungs CTA, no rhonchi, wheezing, or crackles  Heart: RRR without murmur, gallop, or rubs, no ectopy  Abdomen: Nondistended, positive bowel sounds, soft,  nontender  Extremities: no edema, ROM x 4 extremities  Peripheral pulses: normal and present x 4 extremities  Neuro: alert, coherent and conversant, no focal motor deficits  Relevant Results     Results for orders placed or performed during the hospital encounter of 12/29/24 (from the past 24 hours)   POCT GLUCOSE   Result Value Ref Range    POCT Glucose 252 (H) 74 - 99 mg/dL   POCT GLUCOSE   Result Value Ref Range    POCT Glucose 177 (H) 74 - 99  mg/dL   POCT GLUCOSE   Result Value Ref Range    POCT Glucose 210 (H) 74 - 99 mg/dL   Comprehensive Metabolic Panel   Result Value Ref Range    Glucose 207 (H) 74 - 99 mg/dL    Sodium 137 136 - 145 mmol/L    Potassium 3.9 3.5 - 5.3 mmol/L    Chloride 106 98 - 107 mmol/L    Bicarbonate 21 21 - 32 mmol/L    Anion Gap 14 10 - 20 mmol/L    Urea Nitrogen 22 6 - 23 mg/dL    Creatinine 0.86 0.50 - 1.30 mg/dL    eGFR >90 >60 mL/min/1.73m*2    Calcium 10.0 8.6 - 10.3 mg/dL    Albumin 4.1 3.4 - 5.0 g/dL    Alkaline Phosphatase 68 33 - 136 U/L    Total Protein 6.7 6.4 - 8.2 g/dL    AST 17 9 - 39 U/L    Bilirubin, Total 0.4 0.0 - 1.2 mg/dL    ALT 12 10 - 52 U/L   CBC   Result Value Ref Range    WBC 4.3 (L) 4.4 - 11.3 x10*3/uL    nRBC 0.0 0.0 - 0.0 /100 WBCs    RBC 4.82 4.50 - 5.90 x10*6/uL    Hemoglobin 14.4 13.5 - 17.5 g/dL    Hematocrit 45.3 41.0 - 52.0 %    MCV 94 80 - 100 fL    MCH 29.9 26.0 - 34.0 pg    MCHC 31.8 (L) 32.0 - 36.0 g/dL    RDW 13.5 11.5 - 14.5 %    Platelets 226 150 - 450 x10*3/uL   POCT GLUCOSE   Result Value Ref Range    POCT Glucose 203 (H) 74 - 99 mg/dL     *Note: Due to a large number of results and/or encounters for the requested time period, some results have not been displayed. A complete set of results can be found in Results Review.         Assessment/Plan    Kenneth Valenzuela is a 64 y.o. male with past medical history of ataxia, BPH, bilateral renal stones, chronic pain syndrome, type 2 diabetes, depression, frequent falls, hyperlipidemia, hypertension, subdural hematoma with right craniotomy for evacuation of subdural hematoma in 2023, behavioral issues, traumatic brain injury at the age of 16 from a pole vaulting accident, and seizures presented to [facility] ED with complaints of multiple falls at home. CT head and C-spine show no acute fracture or subluxation. CT pelvis without IV contrast negative for fracture or dislocation. X-ray to the left knee shows intact arthroplasty without fracture or  dislocation. X-ray to left shoulder shows DJD without acute fracture. Patient will be admitted to medical surgical floor with working diagnosis of frequent falls and need for placement.   Assessment & Plan  Falls frequently  -PT/OT consult-appreciate recommendations  -Social work consult to assist with discharge planning    Generalized weakness    History of traumatic brain injury/status postcraniotomy/seizure disorder.  -Continue Keppra, Seroquel, Cymbalta  -Because the patient's history of TBI he does have agitation and behaviors.   Patient on Keppra, Provigil, Seroquel, Cymbalta.    Chronic pain syndrome  -Tylenol scheduled around-the-clock  -Continue home [place].  Morphine for severe pain breakthrough pain.    Depression  -Continue Cymbalta and Seroquel      BPH  -tamsulosin 0.4 mg p.o. daily     Essential hypertension  -Losartan 25 mg p.o. daily  -Metoprolol 25 mg daily, patient has been tachycardic up to 125.  I will increase the dose to 25 mg twice daily and monitor heart rate accordingly   -Telemetry monitoring     Hypokalemia  -potassium low 2 days in row, will make 40meq daily patient's potassium today 3.9     Hypertryglycerdiemia  -fenofibrate 160mg po daily      Bowel regimen: miralax   Dvt prophylaxis: lovenox subcu   Dispo: Medically ready for discharge, awaiting pre-CERT for SNF placement.                MARVIN Sales-CNP

## 2025-01-02 LAB
GLUCOSE BLD MANUAL STRIP-MCNC: 153 MG/DL (ref 74–99)
GLUCOSE BLD MANUAL STRIP-MCNC: 180 MG/DL (ref 74–99)
GLUCOSE BLD MANUAL STRIP-MCNC: 184 MG/DL (ref 74–99)
GLUCOSE BLD MANUAL STRIP-MCNC: 226 MG/DL (ref 74–99)

## 2025-01-02 PROCEDURE — 2500000002 HC RX 250 W HCPCS SELF ADMINISTERED DRUGS (ALT 637 FOR MEDICARE OP, ALT 636 FOR OP/ED): Performed by: NURSE PRACTITIONER

## 2025-01-02 PROCEDURE — 2500000005 HC RX 250 GENERAL PHARMACY W/O HCPCS: Performed by: NURSE PRACTITIONER

## 2025-01-02 PROCEDURE — 2500000002 HC RX 250 W HCPCS SELF ADMINISTERED DRUGS (ALT 637 FOR MEDICARE OP, ALT 636 FOR OP/ED)

## 2025-01-02 PROCEDURE — 2500000001 HC RX 250 WO HCPCS SELF ADMINISTERED DRUGS (ALT 637 FOR MEDICARE OP): Performed by: NURSE PRACTITIONER

## 2025-01-02 PROCEDURE — 1200000002 HC GENERAL ROOM WITH TELEMETRY DAILY

## 2025-01-02 PROCEDURE — 97110 THERAPEUTIC EXERCISES: CPT | Mod: GP,CQ

## 2025-01-02 PROCEDURE — 2500000004 HC RX 250 GENERAL PHARMACY W/ HCPCS (ALT 636 FOR OP/ED)

## 2025-01-02 PROCEDURE — 82947 ASSAY GLUCOSE BLOOD QUANT: CPT

## 2025-01-02 PROCEDURE — 2500000004 HC RX 250 GENERAL PHARMACY W/ HCPCS (ALT 636 FOR OP/ED): Performed by: STUDENT IN AN ORGANIZED HEALTH CARE EDUCATION/TRAINING PROGRAM

## 2025-01-02 PROCEDURE — 2500000001 HC RX 250 WO HCPCS SELF ADMINISTERED DRUGS (ALT 637 FOR MEDICARE OP)

## 2025-01-02 PROCEDURE — 2500000004 HC RX 250 GENERAL PHARMACY W/ HCPCS (ALT 636 FOR OP/ED): Performed by: PHYSICIAN ASSISTANT

## 2025-01-02 PROCEDURE — 99232 SBSQ HOSP IP/OBS MODERATE 35: CPT | Performed by: NURSE PRACTITIONER

## 2025-01-02 RX ORDER — IBUPROFEN 600 MG/1
600 TABLET ORAL EVERY 6 HOURS SCHEDULED
Status: DISCONTINUED | OUTPATIENT
Start: 2025-01-02 | End: 2025-01-08 | Stop reason: HOSPADM

## 2025-01-02 RX ORDER — LIDOCAINE 560 MG/1
1 PATCH PERCUTANEOUS; TOPICAL; TRANSDERMAL DAILY
Status: DISCONTINUED | OUTPATIENT
Start: 2025-01-02 | End: 2025-01-02

## 2025-01-02 RX ORDER — LIDOCAINE 560 MG/1
2 PATCH PERCUTANEOUS; TOPICAL; TRANSDERMAL DAILY
Status: DISCONTINUED | OUTPATIENT
Start: 2025-01-02 | End: 2025-01-08 | Stop reason: HOSPADM

## 2025-01-02 RX ORDER — MORPHINE SULFATE 4 MG/ML
4 INJECTION, SOLUTION INTRAMUSCULAR; INTRAVENOUS
Status: DISCONTINUED | OUTPATIENT
Start: 2025-01-02 | End: 2025-01-02

## 2025-01-02 RX ADMIN — LOSARTAN POTASSIUM 25 MG: 25 TABLET, FILM COATED ORAL at 10:08

## 2025-01-02 RX ADMIN — QUETIAPINE FUMARATE 25 MG: 25 TABLET ORAL at 20:33

## 2025-01-02 RX ADMIN — HYDROCODONE BITARTRATE AND ACETAMINOPHEN 2 TABLET: 5; 325 TABLET ORAL at 04:20

## 2025-01-02 RX ADMIN — HYDROCODONE BITARTRATE AND ACETAMINOPHEN 2 TABLET: 5; 325 TABLET ORAL at 10:20

## 2025-01-02 RX ADMIN — Medication 2000 UNITS: at 10:06

## 2025-01-02 RX ADMIN — QUETIAPINE FUMARATE 25 MG: 25 TABLET ORAL at 10:09

## 2025-01-02 RX ADMIN — FLUTICASONE PROPIONATE 1 SPRAY: 50 SPRAY, METERED NASAL at 10:21

## 2025-01-02 RX ADMIN — FLUOROMETHOLONE 1 DROP: 1 SOLUTION/ DROPS OPHTHALMIC at 10:21

## 2025-01-02 RX ADMIN — POLYETHYLENE GLYCOL 3350 17 G: 17 POWDER, FOR SOLUTION ORAL at 10:22

## 2025-01-02 RX ADMIN — ACETAMINOPHEN 975 MG: 325 TABLET ORAL at 10:28

## 2025-01-02 RX ADMIN — ENOXAPARIN SODIUM 40 MG: 40 INJECTION SUBCUTANEOUS at 17:35

## 2025-01-02 RX ADMIN — LIDOCAINE 2 PATCH: 4 PATCH TOPICAL at 15:34

## 2025-01-02 RX ADMIN — MORPHINE SULFATE 4 MG: 4 INJECTION, SOLUTION INTRAMUSCULAR; INTRAVENOUS at 05:55

## 2025-01-02 RX ADMIN — POTASSIUM CHLORIDE 40 MEQ: 1500 TABLET, EXTENDED RELEASE ORAL at 10:22

## 2025-01-02 RX ADMIN — MODAFINIL 200 MG: 100 TABLET ORAL at 10:37

## 2025-01-02 RX ADMIN — HYDROCODONE BITARTRATE AND ACETAMINOPHEN 2 TABLET: 5; 325 TABLET ORAL at 18:13

## 2025-01-02 RX ADMIN — LEVETIRACETAM 500 MG: 500 TABLET, FILM COATED ORAL at 10:08

## 2025-01-02 RX ADMIN — DULOXETINE HYDROCHLORIDE 60 MG: 60 CAPSULE, DELAYED RELEASE ORAL at 10:21

## 2025-01-02 RX ADMIN — OXYCODONE HYDROCHLORIDE AND ACETAMINOPHEN 1000 MG: 500 TABLET ORAL at 10:08

## 2025-01-02 RX ADMIN — FENOFIBRATE 160 MG: 160 TABLET ORAL at 10:07

## 2025-01-02 RX ADMIN — METOPROLOL SUCCINATE 25 MG: 25 TABLET, EXTENDED RELEASE ORAL at 10:07

## 2025-01-02 RX ADMIN — DULOXETINE HYDROCHLORIDE 30 MG: 60 CAPSULE, DELAYED RELEASE ORAL at 10:09

## 2025-01-02 RX ADMIN — IBUPROFEN 600 MG: 600 TABLET, FILM COATED ORAL at 17:35

## 2025-01-02 RX ADMIN — FLUOROMETHOLONE 1 DROP: 1 SOLUTION/ DROPS OPHTHALMIC at 20:34

## 2025-01-02 RX ADMIN — MORPHINE SULFATE 4 MG: 4 INJECTION, SOLUTION INTRAMUSCULAR; INTRAVENOUS at 11:44

## 2025-01-02 RX ADMIN — LEVETIRACETAM 500 MG: 500 TABLET, FILM COATED ORAL at 20:33

## 2025-01-02 RX ADMIN — TAMSULOSIN HYDROCHLORIDE 0.4 MG: 0.4 CAPSULE ORAL at 10:06

## 2025-01-02 RX ADMIN — FLUOROMETHOLONE 1 DROP: 1 SOLUTION/ DROPS OPHTHALMIC at 15:34

## 2025-01-02 RX ADMIN — QUETIAPINE FUMARATE 25 MG: 25 TABLET ORAL at 15:32

## 2025-01-02 RX ADMIN — MORPHINE SULFATE 2 MG: 2 INJECTION, SOLUTION INTRAMUSCULAR; INTRAVENOUS at 03:03

## 2025-01-02 ASSESSMENT — PAIN SCALES - GENERAL
PAINLEVEL_OUTOF10: 9
PAINLEVEL_OUTOF10: 8
PAINLEVEL_OUTOF10: 7
PAINLEVEL_OUTOF10: 9
PAINLEVEL_OUTOF10: 8
PAINLEVEL_OUTOF10: 6
PAINLEVEL_OUTOF10: 8
PAINLEVEL_OUTOF10: 10 - WORST POSSIBLE PAIN
PAINLEVEL_OUTOF10: 8
PAINLEVEL_OUTOF10: 0 - NO PAIN
PAINLEVEL_OUTOF10: 8
PAINLEVEL_OUTOF10: 10 - WORST POSSIBLE PAIN
PAINLEVEL_OUTOF10: 8

## 2025-01-02 ASSESSMENT — COGNITIVE AND FUNCTIONAL STATUS - GENERAL
DAILY ACTIVITIY SCORE: 20
DAILY ACTIVITIY SCORE: 20
PERSONAL GROOMING: A LITTLE
STANDING UP FROM CHAIR USING ARMS: A LOT
TURNING FROM BACK TO SIDE WHILE IN FLAT BAD: A LITTLE
PERSONAL GROOMING: A LITTLE
MOBILITY SCORE: 11
MOVING TO AND FROM BED TO CHAIR: A LOT
CLIMB 3 TO 5 STEPS WITH RAILING: TOTAL
CLIMB 3 TO 5 STEPS WITH RAILING: A LOT
WALKING IN HOSPITAL ROOM: A LOT
MOVING TO AND FROM BED TO CHAIR: A LOT
MOBILITY SCORE: 15
HELP NEEDED FOR BATHING: A LITTLE
MOVING TO AND FROM BED TO CHAIR: A LOT
WALKING IN HOSPITAL ROOM: A LOT
MOBILITY SCORE: 15
TURNING FROM BACK TO SIDE WHILE IN FLAT BAD: A LOT
MOVING FROM LYING ON BACK TO SITTING ON SIDE OF FLAT BED WITH BEDRAILS: A LOT
CLIMB 3 TO 5 STEPS WITH RAILING: A LOT
HELP NEEDED FOR BATHING: A LITTLE
TURNING FROM BACK TO SIDE WHILE IN FLAT BAD: A LITTLE
STANDING UP FROM CHAIR USING ARMS: A LOT
TOILETING: A LOT
WALKING IN HOSPITAL ROOM: A LOT
TOILETING: A LOT
STANDING UP FROM CHAIR USING ARMS: A LOT

## 2025-01-02 ASSESSMENT — PAIN DESCRIPTION - LOCATION
LOCATION: HIP
LOCATION: BACK
LOCATION: OTHER (COMMENT)
LOCATION: HIP
LOCATION: OTHER (COMMENT)

## 2025-01-02 ASSESSMENT — PAIN - FUNCTIONAL ASSESSMENT
PAIN_FUNCTIONAL_ASSESSMENT: 0-10

## 2025-01-02 NOTE — PROGRESS NOTES
Care Transitions: Patient reviewed in care round meeting this AM, awaiting placement. SNF referral status checked in [facility]. [facility] responded and also unable to accept patient due to out of network with high out of pocket deductibles. Sent SNF referrals to ten other facilities in the [facility] area with patient new insurance information. Spoke to patient wife Yvette to update on facilities out of network and unable to accept. [facility] contacted wife and set up an onsite visit for today at 1615. Wife is coming in to meet at bedside as well. Wife is unsure she wants patient to go to a facility in [place] or [place], However all facilities in [place] are unable to accept or out of network with patient insurance. Wife states if this does not work out with [facility] she is going to consider taking patient home with home care. Wants care team to follow up tomorrow after she has time to think about her options this evening. Hospital provider updated to placement difficulty due to insurance. Tiffany Segovia RN/TCC

## 2025-01-02 NOTE — PROGRESS NOTES
Kenneth Valenzuela is a 64 y.o. male on day 4 of admission presenting with Generalized weakness.      Subjective   Patient sitting up in chair eating breakfast.  No overnight events reported.  Patient voices no complaints.       Objective     Last Recorded Vitals  BP (!) 167/93   Pulse 91   Temp 36.1 °C (97 °F)   Resp 20   Wt 82.7 kg (182 lb 5.1 oz)   SpO2 93%   Intake/Output last 3 Shifts:    Intake/Output Summary (Last 24 hours) at 1/2/2025 0947  Last data filed at 1/2/2025 0900  Gross per 24 hour   Intake 710 ml   Output 700 ml   Net 10 ml       Admission Weight  Weight: 86.2 kg (190 lb) (12/29/24 1118)    Daily Weight  12/29/24 : 82.7 kg (182 lb 5.1 oz)    Image Results  CT pelvis wo IV contrast  Narrative: Interpreted By:  Wilberot oGng,   STUDY:  CT PELVIS WO IV CONTRAST;  12/29/2024 12:11 pm      INDICATION:  Signs/Symptoms:fall.          COMPARISON:  CT chest, abdomen and pelvis with contrast 27 April 2024      ACCESSION NUMBER(S):  [id number]      ORDERING CLINICIAN:  RACHAEL VILLATORO      TECHNIQUE:  CT pelvis from the pelvic inlet through the symphysis pubis without  IV contrast. Coronal and sagittal reformatted images created,  reviewed and saved      FINDINGS:  No acute fracture or hip dislocation or any other acute traumatic  injury evident      Old, healed right inferior pubic ramus fracture      Severe degenerative changes of left hip with subchondral cystic  change on both sides of the joint similar to prior CT      Included lower left kidney redemonstrates portions of two cysts and a  nonobstructing stone. No acute unexpected soft tissue findings  including the field-of-view      Impression: No acute fracture, dislocation, other traumatic injury or any other  acute process otherwise      Old, healed right inferior pubic ramus fracture      MACRO:  None      Signed by: Wilberto Gong 12/29/2024 12:40 PM  Dictation workstation:   [id number]  CT head wo IV contrast, CT cervical spine wo IV  contrast  Narrative: Interpreted By:  Wilberto Gong,   STUDY:  CT HEAD WO IV CONTRAST; CT CERVICAL SPINE WO IV CONTRAST;  12/29/2024  12:11 pm      INDICATION:  Signs/Symptoms:fall.          COMPARISON:  CT brain and cervical spine both from 7 April 2024      ACCESSION NUMBER(S):  [id number]; [id number]      ORDERING CLINICIAN:  RACHAEL VILLATORO      TECHNIQUE:  CT of the brain from the skull vertex to the skull base, without  intravenous contrast      CT cervical spine from the craniocervical junction through the  cervicothoracic junction without IV contrast, including sagittal and  coronal reformatted images      FINDINGS:  CT BRAIN      TRAUMA-RELATED      Brain Injury (BIG) guidelines CT values:      Skull fracture: No  SDH (subdural hematoma): None detected  EDH (epidural hematoma): None detected  IPH (intraparenchymal hemorrhage): None detected  SAH (subarachnoid hemorrhage): None detected  IVH (intraventricular hemorrhage): None detected      Reference:  Aba DUTTON, Fawn RS, Nelda M, et al. The BIG (brain injury  guidelines) project: defining the management of traumatic brain  injury by acute care surgeons. J Trauma Acute Care Surg.  2014;76:288j832.      OTHER      ACUTE INTRACRANIAL MASS EFFECT:  Negative      CT EVIDENCE OF ACUTE / SUBACUTE TERRITORIAL ISCHEMIA:  Negative      VENTRICLES:  Unchanged prominence of bilateral lateral without third  or fourth ventricle enlargement or acute obstructive hydrocephalus      OTHER BRAIN FINDINGS:  No significant interval change to the CT  appearance of the brain including the degree of atrophy and deep  white matter changes from chronic microvascular disease and large  area of encephalomalacia in left parietotemporal distribution      INCLUDED PARANASAL SINUSES: All clear      INCLUDED MASTOID AIR CELLS: All clear      SKULL:  No lytic or blastic lesion. Right craniotomy      EXTRACRANIAL SOFT TISSUES:  Scalp and occular globes grossly normal  by CT       -------      CT CERVICAL SPINE      COUNTING REFERENCE: Craniocervical junction      CRANIOCERVICAL JUNCTION:  Intact      CERVICAL ALIGNMENT:  Anatomic; no acute traumatic alignment  abnormality such as subluxation      ACUTE FRACTURE: Negative      AGGRESSIVE OSSEOUS LESION: Negative      BONY CANAL AND FORAMINA:  No significant change from 7 April 2024      PARASPINAL SOFT TISSUES:  No large acute hematoma or other acute  posttraumatic finding      OTHER INCLUDED STRUCTURES:  No acute or contributory soft tissue  abnormality in the other cervical and upper thoracic soft tissues      Impression: NO ACUTE INTRACRANIAL PROCESS. SKULL INTACT      NO ACUTE FRACTURE OR SUBLUXATION IN THE CERVICAL SPINE      THIS REPORT SERVES AS THE DIAGNOSTIC INTERPRETATION FOR TWO EXAMS  PERFORMED CONCURRENTLY: CT BRAIN WITHOUT IV CONTRAST AND CT CERVICAL  SPINE WITHOUT IV CONTRAST      MACRO:  None      Signed by: Wilberto Gong 12/29/2024 12:35 PM  Dictation workstation:   [id number]  XR shoulder left 2+ views  Narrative: Interpreted By:  Geronimo Krishnamurthy,   STUDY:  XR SHOULDER LEFT 2+ VIEWS;  12/29/2024 11:57 am      INDICATION:  Signs/Symptoms:fall.      COMPARISON:  Prior exam from 09/08/2022      ACCESSION NUMBER(S):  [id number]      ORDERING CLINICIAN:  RACHAEL VILLATORO      TECHNIQUE:  3 views  of the  left shoulder were obtained.      FINDINGS:  Mild-to-moderate AC joint hypertrophy with spur formation. Moderate  glenohumeral joint space loss with spur formation. There is a  calcified loose body overlying the scapular neck. No lytic or blastic  destructive bone lesion. No acute fracture or dislocation.  No opaque soft tissue foreign body.  No periosteal reaction or erosion.      Impression: DJD throughout the left shoulder region as described. No acute  fracture or dislocation based on this exam.      MACRO:  None      Signed by: Geronimo Krishnamurthy 12/29/2024 12:09 PM  Dictation workstation:   [id number]  XR knee left 1-2  views  Narrative: Interpreted By:  Geronimo Krishnamurthy,   STUDY:  XR KNEE LEFT 1-2 VIEWS;  12/29/2024 11:57 am      INDICATION:  Signs/Symptoms:fall.      COMPARISON:  Prior exam from 02/11/2019      ACCESSION NUMBER(S):  [id number]      ORDERING CLINICIAN:  RACHAEL VILLATORO      TECHNIQUE:  AP and lateral views  of the  left knee were obtained.      FINDINGS:  Previous left knee arthroplasty. Increase in soft tissue  calcification in the suprapatellar bursa. There is also fluid in the  bursa. Patellar component is radiolucent. Femoral and tibial  components are well seated and in good alignment. No lytic or blastic  destructive bone lesion. No acute fracture or dislocation.  No opaque soft tissue foreign body.  No periosteal reaction or erosion.      Impression: Intact left knee arthroplasty. No acute fracture or dislocation.      Suprapatellar effusion.      Increase in nonspecific calcification within the suprapatellar bursa.      MACRO:  None      Signed by: Geronimo Krishnamurthy 12/29/2024 12:08 PM  Dictation workstation:   [id number]      Physical Exam  General Appearance: AAO x 1, not in acute distress  Skin: skin color, texture, turgor normal; no suspicious rashes or lesions  Eyes : PERRL, EOM's intact, conjunctiva pink  ENT: no oral thrush, no pharyngeal erythema or exudates  Neck: no JVD, no lymphadenopathy  Respiratory: lungs CTA, no rhonchi, wheezing, or crackles  Heart: RRR without murmur, gallop, or rubs, no ectopy  Abdomen: Nondistended, positive bowel sounds, soft,  nontender  Extremities: no edema, ROM x 4 extremities  Peripheral pulses: normal and present x 4 extremities  Neuro: alert, coherent and conversant, no focal motor deficits  Relevant Results    Results for orders placed or performed during the hospital encounter of 12/29/24 (from the past 24 hours)   POCT GLUCOSE   Result Value Ref Range    POCT Glucose 306 (H) 74 - 99 mg/dL   POCT GLUCOSE   Result Value Ref Range    POCT Glucose 191 (H) 74 - 99 mg/dL    POCT GLUCOSE   Result Value Ref Range    POCT Glucose 215 (H) 74 - 99 mg/dL   POCT GLUCOSE   Result Value Ref Range    POCT Glucose 153 (H) 74 - 99 mg/dL     *Note: Due to a large number of results and/or encounters for the requested time period, some results have not been displayed. A complete set of results can be found in Results Review.         Assessment/Plan    Kenneth Valenzuela is a 64 y.o. male with past medical history of ataxia, BPH, bilateral renal stones, chronic pain syndrome, type 2 diabetes, depression, frequent falls, hyperlipidemia, hypertension, subdural hematoma with right craniotomy for evacuation of subdural hematoma in 2023, behavioral issues, traumatic brain injury at the age of 16 from a pole vaulting accident, and seizures presented to [facility] ED with complaints of multiple falls at home. CT head and C-spine show no acute fracture or subluxation. CT pelvis without IV contrast negative for fracture or dislocation. X-ray to the left knee shows intact arthroplasty without fracture or dislocation. X-ray to left shoulder shows DJD without acute fracture. Patient will be admitted to medical surgical floor with working diagnosis of frequent falls and need for placement.   Assessment & Plan  Falls frequently  -PT/OT consult-appreciate recommendations  -Social work consult to assist with discharge planning    Generalized weakness    History of traumatic brain injury/status postcraniotomy/seizure disorder.  -Continue Keppra, Seroquel, Cymbalta  -Because the patient's history of TBI he does have agitation and behaviors.   Patient on Keppra, Provigil, Seroquel, Cymbalta.    Chronic pain syndrome  -Tylenol scheduled around-the-clock  -Continue home [place].  Morphine for severe pain breakthrough pain.    Depression  -Continue Cymbalta and Seroquel      BPH  -tamsulosin 0.4 mg p.o. daily     Essential hypertension  -Losartan 25 mg p.o. daily  -Metoprolol 25 mg daily, patient has been tachycardic up to 125.   I will increase the dose to 25 mg twice daily and monitor heart rate accordingly blood pressures ranged from 104/64 to 167/93, elevated blood pressures occurred to be in the morning before medications are given.   -Telemetry monitoring        Hypertryglycerdiemia  -fenofibrate 160mg po daily      Bowel regimen: miralax   Dvt prophylaxis: lovenox subcu   Dispo: Medically ready for discharge, awaiting pre-CERT for SNF placement.                Yaquelin Laird, APRN-CNP

## 2025-01-02 NOTE — PROGRESS NOTES
Physical Therapy    Physical Therapy Treatment    Patient Name: Kenneth Valenzuela  MRN: [id number]  Today's Date: 1/2/2025  Time Calculation  Start Time: 1242  Stop Time: 1304  Time Calculation (min): 22 min     303/303-A    Assessment/Plan   PT Assessment  PT Assessment Results: Decreased strength, Decreased range of motion, Decreased endurance, Impaired balance, Decreased mobility, Impaired judgement, Decreased safety awareness, Pain  Rehab Prognosis: Fair  Barriers to Discharge Home: Cognition needs, Caregiver assistance  Caregiver Assistance: Caregiver assistance needed per identified barriers - however, level of patient's required assistance exceeds assistance available at home  Cognition Needs: 24hr supervision for safety awareness needed  Evaluation/Treatment Tolerance: Patient limited by fatigue  End of Session Communication: Bedside nurse  Assessment Comment: Pt. up in chair upon arrival.  Agreeable to therapy.  Pt. tolerated most exercises well.  Pt. c/o increased left hip pain with trying heel slides.  Poor eccentric control noted with SAQ's.  Frequent breaks needed during session.  End of Session Patient Position: Up in chair, Alarm on (call light within reach)  PT Plan  Inpatient/Swing Bed or Outpatient: Inpatient  PT Plan  Treatment/Interventions: Bed mobility, Transfer training, Stair training, Gait training, Balance training, Neuromuscular re-education, Strengthening, Endurance training, Range of motion, Therapeutic exercise, Therapeutic activity, Home exercise program  PT Plan: Ongoing PT  PT Frequency: 3 times per week  PT Discharge Recommendations: Moderate intensity level of continued care, 24 hr supervision due to cognition  PT Recommended Transfer Status: Assistive device, Assist x2  PT - OK to Discharge: Yes (PT eval complete, ok to d/c once deemed medically appropriate)    Current Problem:  Patient Active Problem List   Diagnosis    Allergic rhinitis    Ataxia    Benign prostatic hyperplasia with  lower urinary tract symptoms    Bilateral renal stones    Cervical arthritis with myelopathy    Chronic pain syndrome    Controlled type 2 diabetes mellitus without complication, without long-term current use of insulin (Multi)    Depression, major, single episode, moderate (Multi)    Falls frequently    Hyperlipidemia    Hypertension    Impingement syndrome of left shoulder    Male erectile disorder    Primary osteoarthritis of both hips    Primary osteoarthritis of left shoulder    Seizure (Multi)    Subdural hematoma (Multi)    Unspecified intracranial injury with loss of consciousness greater than 24 hours with return to pre-existing conscious level, initial encounter (Multi)    Balance disturbance due to old head injury    Behavior-irritability    Cognitive deficit as late effect of traumatic brain injury    Diabetic polyneuropathy associated with type 2 diabetes mellitus (Multi)    Difficulty controlling behavior as late effect traumatic brain injury    Hip pain, left    Hypercalcemia    Migraine without aura and without status migrainosus, not intractable    Median nerve entrapment    Class 1 obesity due to excess calories with serious comorbidity and body mass index (BMI) of 31.0 to 31.9 in adult    Tachycardia    Thoracic back pain    Generalized weakness    Traumatic retroperitoneal hematoma    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 did wake briefly but could not stay awake for session.Will attempt to see pt. the next day.)  Family/Caregiver Present: No  Prior to Session Communication: Bedside nurse  Patient Position Received: Up in chair, Alarm  "on  General Comment: agreeable to therapy.  States \"I'm pretty strong\".  Subjective     Precautions:  Precautions  Medical Precautions: Fall precautions, Seizure precautions    Vital Signs:  Vital Signs  SpO2: 94 %  Objective     Pain:  Pain Assessment  Pain Assessment: 0-10  0-10 (Numeric) Pain Score: 8  Pain Location: Hip  Pain Orientation: Left    Cognition:  Cognition  Orientation Level: Oriented X4    Postural Control:       Extremity/Trunk Assessments:                Activity Tolerance:  Activity Tolerance  Endurance: Decreased tolerance for upright activites    Treatments:  Therapeutic Exercise  Therapeutic Exercise Performed: Yes  Therapeutic Exercise Activity 1: resisted PF/DF x10  Therapeutic Exercise Activity 2: heel slides x5  Therapeutic Exercise Activity 3: SAQ's x10  Therapeutic Exercise Activity 4: Hip adduction, kitty.                         Outcome Measures:     [facility] Basic Mobility  Turning from your back to your side while in a flat bed without using bedrails: A lot  Moving from lying on your back to sitting on the side of a flat bed without using bedrails: A lot  Moving to and from bed to chair (including a wheelchair): A lot  Standing up from a chair using your arms (e.g. wheelchair or bedside chair): A lot  To walk in hospital room: A lot  Climbing 3-5 steps with railing: Total  Basic Mobility - Total Score: 11                                      Education Documentation  No documentation found.  Education Comments  No comments found.           EDUCATION:     Encounter Problems       Encounter Problems (Active)       PT Problem       Pt will demo stand pivot transfer and sit > stand > sit transfer with FWW and CGA  (Progressing)       Start:  12/30/24    Expected End:  01/13/25            Pt will ambulate 3' with FWW and CGA, without LOB  (Progressing)       Start:  12/30/24    Expected End:  01/13/25            Pt will sit EOB x5 minutes with supervision while participating in therex program " to demonstrate improved trunk control (Progressing)       Start:  12/30/24    Expected End:  01/13/25            Pt will demonstrate ability to tolerate 8 minutes of seated or standing therapeutic exercise with 4 or less rest breaks to demonstrate improved activity tolerance.  (Progressing)       Start:  12/30/24    Expected End:  01/13/25               Pain - Adult

## 2025-01-02 NOTE — PROGRESS NOTES
Spiritual Care Visit  Spiritual Care Request    Reason for Visit:  Routine Visit: Follow-up  Continue Visiting: Yes     Request Received From:       Focus of Care:  Visited With: Patient, Family         Refer to :  Referral To:        Spiritual Care Assessment    Spiritual Assessment:  Patient Spiritual Care Encounters  Suffering Severity: None  Fear Level: Mild  Feelings of Loneliness: Excellent  Feelings of Hopelessness: Excellent  Coping: Often demonstrated  Social Interaction: Cooperates in daily activities    Family Spiritual Care Encounters  Family Coping: Accepting, Fearful  Family Participation in Care: Consistently demonstrated  Family Support During Treatment: Consistently demonstrated  Caregiver-Patient Relationship: Not compromised              Care Provided:  Intended Effects: Aligning care plan with patient's values, Build relationship of care and support, Convey a calming presence, Establish rapport and connectedness, Zohreh affirmation, De-esclate emotionally charged situations  Methods: Accompany someone in their spiritual/Christian practice outside your zohreh tradition, Assist with finding purpose, Assist with spiritual/Christian practices, Collaborate with care team member, Demonstrate acceptance, Encourage end of life review, Encourage self care  Interventions: Ask guided questions about the nature and presence of God, Ask guided questions, Active listening, Acknowledge current situation, Acknowledge response to difficult experience, Ask questions to bring forth feelings, Assist with identifying strengths, Bless Christian item(s)    Sense of Community and or Scientology Affiliation:  Unknown         Addressed Needs/Concerns and/or Tri Through:  Scientology Encounters  Scientology Needs: Sacred text, Literature, Spiritual care brochure, Prayer  Sacramental Encounters  Communion Given Indicator: No    Outcome:  Outcome of Spiritual Care Visit: Acceptance, Affirmation, [place],  Comfort/healing presence, Crisis subsided/resolved, Dealing with ambiguity, Emotional release, Empowerment, Identifying spiritual/emotional issues, Peace/gratitude, Resolution of identified issues, Spirituality connected, Stress management, Support system identified     Advance Directives:  Advance Directives  Advance Directives Reviewed with: Patient, Spouse, Friend      Spiritual Care Annotation    Annotation:   Promote a scence of peace

## 2025-01-02 NOTE — ASSESSMENT & PLAN NOTE
History of traumatic brain injury/status postcraniotomy/seizure disorder.  -Continue Keppra, Seroquel, Cymbalta  -Because the patient's history of TBI he does have agitation and behaviors.   Patient on Keppra, Provigil, Seroquel, Cymbalta.    Chronic pain syndrome  -Tylenol scheduled around-the-clock  -Continue home [place].  Morphine for severe pain breakthrough pain.    Depression  -Continue Cymbalta and Seroquel      BPH  -tamsulosin 0.4 mg p.o. daily     Essential hypertension  -Losartan 25 mg p.o. daily  -Metoprolol 25 mg daily, patient has been tachycardic up to 125.  I will increase the dose to 25 mg twice daily and monitor heart rate accordingly blood pressures ranged from 104/64 to 167/93, elevated blood pressures occurred to be in the morning before medications are given.   -Telemetry monitoring        Hypertryglycerdiemia  -fenofibrate 160mg po daily      Bowel regimen: miralax   Dvt prophylaxis: lovenox subcu   Dispo: Medically ready for discharge, awaiting pre-CERT for SNF placement.

## 2025-01-03 LAB
GLUCOSE BLD MANUAL STRIP-MCNC: 155 MG/DL (ref 74–99)
GLUCOSE BLD MANUAL STRIP-MCNC: 157 MG/DL (ref 74–99)
GLUCOSE BLD MANUAL STRIP-MCNC: 192 MG/DL (ref 74–99)
GLUCOSE BLD MANUAL STRIP-MCNC: 222 MG/DL (ref 74–99)

## 2025-01-03 PROCEDURE — 2500000001 HC RX 250 WO HCPCS SELF ADMINISTERED DRUGS (ALT 637 FOR MEDICARE OP): Performed by: NURSE PRACTITIONER

## 2025-01-03 PROCEDURE — 2500000002 HC RX 250 W HCPCS SELF ADMINISTERED DRUGS (ALT 637 FOR MEDICARE OP, ALT 636 FOR OP/ED)

## 2025-01-03 PROCEDURE — 97535 SELF CARE MNGMENT TRAINING: CPT | Mod: GO

## 2025-01-03 PROCEDURE — 99232 SBSQ HOSP IP/OBS MODERATE 35: CPT | Performed by: NURSE PRACTITIONER

## 2025-01-03 PROCEDURE — 97530 THERAPEUTIC ACTIVITIES: CPT | Mod: GP | Performed by: PHYSICAL THERAPIST

## 2025-01-03 PROCEDURE — 1100000001 HC PRIVATE ROOM DAILY

## 2025-01-03 PROCEDURE — 2500000004 HC RX 250 GENERAL PHARMACY W/ HCPCS (ALT 636 FOR OP/ED)

## 2025-01-03 PROCEDURE — 97116 GAIT TRAINING THERAPY: CPT | Mod: GP | Performed by: PHYSICAL THERAPIST

## 2025-01-03 PROCEDURE — 2500000001 HC RX 250 WO HCPCS SELF ADMINISTERED DRUGS (ALT 637 FOR MEDICARE OP)

## 2025-01-03 PROCEDURE — 82947 ASSAY GLUCOSE BLOOD QUANT: CPT

## 2025-01-03 PROCEDURE — 2500000005 HC RX 250 GENERAL PHARMACY W/O HCPCS: Performed by: NURSE PRACTITIONER

## 2025-01-03 PROCEDURE — 2500000002 HC RX 250 W HCPCS SELF ADMINISTERED DRUGS (ALT 637 FOR MEDICARE OP, ALT 636 FOR OP/ED): Performed by: NURSE PRACTITIONER

## 2025-01-03 PROCEDURE — 97530 THERAPEUTIC ACTIVITIES: CPT | Mod: GO

## 2025-01-03 RX ORDER — LIDOCAINE 560 MG/1
2 PATCH PERCUTANEOUS; TOPICAL; TRANSDERMAL DAILY
Start: 2025-01-04 | End: 2025-02-03

## 2025-01-03 RX ADMIN — MODAFINIL 200 MG: 100 TABLET ORAL at 10:29

## 2025-01-03 RX ADMIN — TAMSULOSIN HYDROCHLORIDE 0.4 MG: 0.4 CAPSULE ORAL at 10:09

## 2025-01-03 RX ADMIN — HYDROCODONE BITARTRATE AND ACETAMINOPHEN 2 TABLET: 5; 325 TABLET ORAL at 10:29

## 2025-01-03 RX ADMIN — IBUPROFEN 600 MG: 600 TABLET, FILM COATED ORAL at 05:32

## 2025-01-03 RX ADMIN — IBUPROFEN 600 MG: 600 TABLET, FILM COATED ORAL at 17:34

## 2025-01-03 RX ADMIN — Medication 2000 UNITS: at 10:05

## 2025-01-03 RX ADMIN — FENOFIBRATE 160 MG: 160 TABLET ORAL at 10:06

## 2025-01-03 RX ADMIN — LOSARTAN POTASSIUM 25 MG: 25 TABLET, FILM COATED ORAL at 10:06

## 2025-01-03 RX ADMIN — FLUOROMETHOLONE 1 DROP: 1 SOLUTION/ DROPS OPHTHALMIC at 10:03

## 2025-01-03 RX ADMIN — LIDOCAINE 2 PATCH: 4 PATCH TOPICAL at 10:04

## 2025-01-03 RX ADMIN — QUETIAPINE FUMARATE 25 MG: 25 TABLET ORAL at 10:05

## 2025-01-03 RX ADMIN — IBUPROFEN 600 MG: 600 TABLET, FILM COATED ORAL at 23:42

## 2025-01-03 RX ADMIN — FLUOROMETHOLONE 1 DROP: 1 SOLUTION/ DROPS OPHTHALMIC at 15:35

## 2025-01-03 RX ADMIN — DULOXETINE HYDROCHLORIDE 60 MG: 60 CAPSULE, DELAYED RELEASE ORAL at 10:05

## 2025-01-03 RX ADMIN — QUETIAPINE FUMARATE 25 MG: 25 TABLET ORAL at 20:06

## 2025-01-03 RX ADMIN — QUETIAPINE FUMARATE 25 MG: 25 TABLET ORAL at 15:35

## 2025-01-03 RX ADMIN — HYDROCODONE BITARTRATE AND ACETAMINOPHEN 2 TABLET: 5; 325 TABLET ORAL at 03:23

## 2025-01-03 RX ADMIN — LEVETIRACETAM 500 MG: 500 TABLET, FILM COATED ORAL at 10:05

## 2025-01-03 RX ADMIN — ACETAMINOPHEN 975 MG: 325 TABLET ORAL at 10:05

## 2025-01-03 RX ADMIN — LEVETIRACETAM 500 MG: 500 TABLET, FILM COATED ORAL at 20:06

## 2025-01-03 RX ADMIN — POTASSIUM CHLORIDE 40 MEQ: 1500 TABLET, EXTENDED RELEASE ORAL at 10:05

## 2025-01-03 RX ADMIN — ACETAMINOPHEN 975 MG: 325 TABLET ORAL at 20:05

## 2025-01-03 RX ADMIN — OXYCODONE HYDROCHLORIDE AND ACETAMINOPHEN 1000 MG: 500 TABLET ORAL at 10:05

## 2025-01-03 RX ADMIN — FLUOROMETHOLONE 1 DROP: 1 SOLUTION/ DROPS OPHTHALMIC at 20:05

## 2025-01-03 RX ADMIN — METOPROLOL SUCCINATE 25 MG: 25 TABLET, EXTENDED RELEASE ORAL at 10:05

## 2025-01-03 RX ADMIN — IBUPROFEN 600 MG: 600 TABLET, FILM COATED ORAL at 12:59

## 2025-01-03 RX ADMIN — FLUTICASONE PROPIONATE 1 SPRAY: 50 SPRAY, METERED NASAL at 10:03

## 2025-01-03 RX ADMIN — ENOXAPARIN SODIUM 40 MG: 40 INJECTION SUBCUTANEOUS at 17:34

## 2025-01-03 RX ADMIN — DULOXETINE HYDROCHLORIDE 30 MG: 60 CAPSULE, DELAYED RELEASE ORAL at 10:08

## 2025-01-03 ASSESSMENT — COGNITIVE AND FUNCTIONAL STATUS - GENERAL
MOVING TO AND FROM BED TO CHAIR: A LOT
WALKING IN HOSPITAL ROOM: A LITTLE
MOVING TO AND FROM BED TO CHAIR: A LOT
CLIMB 3 TO 5 STEPS WITH RAILING: A LOT
EATING MEALS: A LITTLE
MOBILITY SCORE: 12
DRESSING REGULAR UPPER BODY CLOTHING: A LITTLE
TURNING FROM BACK TO SIDE WHILE IN FLAT BAD: A LITTLE
STANDING UP FROM CHAIR USING ARMS: A LOT
STANDING UP FROM CHAIR USING ARMS: A LOT
TOILETING: A LOT
PERSONAL GROOMING: A LITTLE
WALKING IN HOSPITAL ROOM: A LOT
CLIMB 3 TO 5 STEPS WITH RAILING: TOTAL
DRESSING REGULAR LOWER BODY CLOTHING: TOTAL
TOILETING: A LOT
PERSONAL GROOMING: A LITTLE
TURNING FROM BACK TO SIDE WHILE IN FLAT BAD: A LOT
DAILY ACTIVITIY SCORE: 20
MOBILITY SCORE: 15
HELP NEEDED FOR BATHING: A LOT
HELP NEEDED FOR BATHING: A LITTLE
MOVING FROM LYING ON BACK TO SITTING ON SIDE OF FLAT BED WITH BEDRAILS: A LOT
DAILY ACTIVITIY SCORE: 14

## 2025-01-03 ASSESSMENT — PAIN SCALES - GENERAL
PAINLEVEL_OUTOF10: 5 - MODERATE PAIN
PAINLEVEL_OUTOF10: 4
PAINLEVEL_OUTOF10: 10 - WORST POSSIBLE PAIN
PAINLEVEL_OUTOF10: 0 - NO PAIN
PAINLEVEL_OUTOF10: 10 - WORST POSSIBLE PAIN
PAINLEVEL_OUTOF10: 10 - WORST POSSIBLE PAIN
PAINLEVEL_OUTOF10: 0 - NO PAIN
PAINLEVEL_OUTOF10: 4

## 2025-01-03 ASSESSMENT — PAIN - FUNCTIONAL ASSESSMENT
PAIN_FUNCTIONAL_ASSESSMENT: 0-10

## 2025-01-03 ASSESSMENT — PAIN DESCRIPTION - LOCATION
LOCATION: BACK
LOCATION: LEG

## 2025-01-03 ASSESSMENT — PAIN DESCRIPTION - ORIENTATION
ORIENTATION: LOWER
ORIENTATION: LEFT

## 2025-01-03 ASSESSMENT — PAIN DESCRIPTION - DESCRIPTORS
DESCRIPTORS: ACHING
DESCRIPTORS: ACHING
DESCRIPTORS: ACHING;DULL

## 2025-01-03 ASSESSMENT — ACTIVITIES OF DAILY LIVING (ADL): HOME_MANAGEMENT_TIME_ENTRY: 13

## 2025-01-03 NOTE — DISCHARGE SUMMARY
Discharge Diagnosis  Generalized weakness    Issues Requiring Follow-Up  Frequent falls  Cognitive deficit  Seizures  Chronic pain syndrome    Discharge Meds     Medication List      START taking these medications     lidocaine 4 % patch; Place 2 patches over 12 hours on the skin once   daily. Remove & discard patch within 12 hours or as directed by MD.; Start   taking on: January 4, 2025     CONTINUE taking these medications     acetaminophen 500 mg tablet; Commonly known as: Tylenol   albuterol 90 mcg/actuation inhaler; Commonly known as: Ventolin HFA;   Inhale 2 puffs every 6 hours if needed for wheezing or shortness of   breath.   apple cider vinegar 600 mg capsule   ascorbic acid 1,000 mg tablet; Commonly known as: Vitamin C   cholecalciferol 50 mcg (2,000 unit) capsule; Commonly known as: Vitamin   D-3   choline fenofibrate 135 mg DR capsule; Commonly known as: Trilipix; Take   1 capsule (135 mg) by mouth once daily.   * DULoxetine 30 mg DR capsule; Commonly known as: Cymbalta; Take 1   capsule (30 mg) by mouth once daily.   * DULoxetine 60 mg DR capsule; Commonly known as: Cymbalta; Take 1   capsule (60 mg) by mouth once daily.   etodolac 400 mg tablet; Commonly known as: Lodine   fluorometholone 0.1 % ophthalmic suspension; Commonly known as: FML   fluticasone 50 mcg/actuation nasal spray; Commonly known as: Flonase;   Administer 1 spray into each nostril once daily. PRN   HYDROcodone-acetaminophen  mg tablet; Commonly known as: Norco;   Take 1 tablet by mouth 3 times a day as needed for severe pain (7 - 10).   irbesartan 75 mg tablet; Commonly known as: Avapro; Take 1 tablet (75   mg) by mouth once daily.   levETIRAcetam 500 mg tablet; Commonly known as: Keppra; Take 1 tablet   (500 mg) by mouth 2 times a day.   metoprolol succinate XL 25 mg 24 hr tablet; Commonly known as:   Toprol-XL; Take 1 tablet (25 mg) by mouth once daily.   milk thistle 175 mg tablet   modafinil 200 mg tablet; Commonly known  as: Provigil   naloxone 4 mg/0.1 mL nasal spray; Commonly known as: Narcan   polyethylene glycol 17 gram/dose powder; Commonly known as: Glycolax,   Miralax   potassium chloride CR 10 mEq ER tablet; Commonly known as: Klor-Con;   Take 1 tablet (10 mEq) by mouth once daily. Do not crush, chew, or split.   QUEtiapine 25 mg tablet; Commonly known as: SEROquel; Take 1 tablet (25   mg) by mouth 3 times a day.   SF 5000 Plus 1.1 % dental cream; Generic drug: fluoride (sodium)   tamsulosin 0.4 mg 24 hr capsule; Commonly known as: Flomax  * This list has 2 medication(s) that are the same as other medications   prescribed for you. Read the directions carefully, and ask your doctor or   other care provider to review them with you.     STOP taking these medications     meloxicam 15 mg tablet; Commonly known as: Mobic   neomycin-polymyxin-HC otic solution; Commonly known as: Cortisporin       Test Results Pending At Discharge  Pending Labs       No current pending labs.            Hospital Course   Kenneth Valenzuela is a 64 y.o. male with past medical history of ataxia, BPH, bilateral renal stones, chronic pain syndrome, type 2 diabetes, depression, frequent falls, hyperlipidemia, hypertension, subdural hematoma with right craniotomy for evacuation of subdural hematoma in 2023, behavioral issues, traumatic brain injury at the age of 16 from a pole vaulting accident, and seizures presented to [facility] ED with complaints of multiple falls at home. CT head and C-spine show no acute fracture or subluxation. CT pelvis without IV contrast negative for fracture or dislocation. X-ray to the left knee shows intact arthroplasty without fracture or dislocation. X-ray to left shoulder shows DJD without acute fracture. Patient will be admitted to medical surgical floor with working diagnosis of frequent falls and need for placement.     Falls frequently  -PT/OT consult-PT Assessment Results: Decreased strength, Decreased range of motion,  Decreased endurance, Impaired balance, Decreased mobility, Impaired judgement, Decreased safety awareness, Pain.Cognition Needs: 24hr supervision for safety awareness needed.     Generalized weakness     History of traumatic brain injury/status postcraniotomy/seizure disorder.  -Continue Keppra, Seroquel, Cymbalta  -Because the patient's history of TBI he does have agitation and behaviors.   Patient on Keppra, Provigil, Seroquel, Cymbalta.  This also affects his balance.     Chronic pain syndrome  -Tylenol scheduled around-the-clock  -Will continue home regimen of Norco and NSAID.  Lidoderm patches to neck and back.     Depression and irritability  -Continue continue Cymbalta and Seroquel       BPH  -Sinew tamsulosin 0.4 mg p.o. daily     Essential hypertension  -Continue losartan 25 mg p.o. daily andMetoprolol 25 mg twice daily.     Hypertryglycerdiemia  -Continue fenofibrate 160mg po daily   Patient hemodynamically stable and will be discharged in stable condition today.  Pertinent Physical Exam At Time of Discharge  Physical Exam  General Appearance: AAO x 1, not in acute distress  Skin: skin color, texture, turgor normal; no suspicious rashes or lesions  Eyes : PERRL, EOM's intact, conjunctiva pink  ENT: no oral thrush, no pharyngeal erythema or exudates  Neck: no JVD, no lymphadenopathy  Respiratory: lungs CTA, no rhonchi, wheezing, or crackles  Heart: RRR without murmur, gallop, or rubs, no ectopy  Abdomen: Nondistended, positive bowel sounds, soft,  nontender  Extremities: no edema, ROM x 4 extremities  Peripheral pulses: normal and present x 4 extremities  Neuro: alert, coherent and conversant, no focal motor deficits.  Outpatient Follow-Up  Future Appointments   Date Time Provider Department Center   4/4/2025  8:40 AM Caden Ladd MD [id number]         Yaquelin Laird, MARVIN-CNP

## 2025-01-03 NOTE — PROGRESS NOTES
Pt reviewed in care rounds this morning. Pt medically ready for discharge. SW met w/ Pt at bedside.  SW explained IMM and Pt signed, SW left copy w/ Pt @ bedside.  Pt and Pt's wife advised Pt OK w/ discharge to [facility], same accepting. [facility] requested precert submission via DSC team. Plan is for Pt to discharge to [facility] pending precert. SW to follow.      01/03/25 1334   Discharge Planning   Expected Discharge Disposition SNF  ([facility] - precert submitted)   Stroke Family Assessment   Stroke Family Assessment Needed No   Intensity of Service   Intensity of Service 0-30 min

## 2025-01-03 NOTE — CARE PLAN
Problem: Skin  Goal: Decreased wound size/increased tissue granulation at next dressing change  Outcome: Progressing  Goal: Participates in plan/prevention/treatment measures  Outcome: Progressing  Goal: Prevent/manage excess moisture  Outcome: Progressing  Goal: Prevent/minimize sheer/friction injuries  Outcome: Progressing  Goal: Promote/optimize nutrition  Outcome: Progressing  Goal: Promote skin healing  Outcome: Progressing     Problem: Diabetes  Goal: Achieve decreasing blood glucose levels by end of shift  Outcome: Progressing  Goal: Increase stability of blood glucose readings by end of shift  Outcome: Progressing  Goal: Maintain electrolyte levels within acceptable range throughout shift  Outcome: Progressing  Goal: Maintain glucose levels >70mg/dl to <250mg/dl throughout shift  Outcome: Progressing  Goal: No changes in neurological exam by end of shift  Outcome: Progressing  Goal: Learn about and adhere to nutrition recommendations by end of shift  Outcome: Progressing  Goal: Vital signs within normal range for age by end of shift  Outcome: Progressing  Goal: Increase self care and/or family involovement by end of shift  Outcome: Progressing     Problem: Pain - Adult  Goal: Verbalizes/displays adequate comfort level or baseline comfort level  Outcome: Progressing     Problem: Safety - Adult  Goal: Free from fall injury  Outcome: Progressing     Problem: Discharge Planning  Goal: Discharge to home or other facility with appropriate resources  Outcome: Progressing     Problem: Chronic Conditions and Co-morbidities  Goal: Patient's chronic conditions and co-morbidity symptoms are monitored and maintained or improved  Outcome: Progressing     Problem: Pain  Goal: Takes deep breaths with improved pain control throughout the shift  Outcome: Progressing  Goal: Turns in bed with improved pain control throughout the shift  Outcome: Progressing  Goal: Performs ADL's with improved pain control throughout  shift  Outcome: Progressing  Goal: Participates in PT with improved pain control throughout the shift  Outcome: Progressing  Goal: Free from opioid side effects throughout the shift  Outcome: Progressing  Goal: Free from acute confusion related to pain meds throughout the shift  Outcome: Progressing     Problem: Fall/Injury  Goal: Not fall by end of shift  Outcome: Progressing  Goal: Be free from injury by end of the shift  Outcome: Progressing  Goal: Use assistive devices by end of the shift  Outcome: Progressing  Goal: Pace activities to prevent fatigue by end of the shift  Outcome: Progressing     Problem: Nutrition  Goal: Oral intake greater than 50%  Outcome: Progressing  Goal: Adequate PO fluid intake  Outcome: Progressing  Goal: BG  mg/dL  Outcome: Progressing  Goal: Lab values WNL  Outcome: Progressing  Goal: Electrolytes WNL  Outcome: Progressing  Goal: Maintain stable weight  Outcome: Progressing  Goal: Gradual weight gain  Outcome: Progressing   The patient's goals for the shift include gain strength    The clinical goals for the shift include increase mobility, monitor VS

## 2025-01-03 NOTE — PROGRESS NOTES
"Music Therapy Note    Kenneth Valenzuela was referred by Laura Krishnamurthy RN.    Therapy Session  Referral Type: New referral this admission  Visit Type: Follow-up visit  Session Start Time: 1640  Session End Time: 1715  Intervention Delivery: In-person  Conflict of Service: None  Number of family members present: 1  Family Present for Session: Spouse/Significant Other  Family Participation: Interactive     Pre-assessment  Unable to Assess Reason: Outcomes not assessed  Pain Score: 8  Stress Level (0-10): 5  Anxiety Level (0-10): 10  Coping Level (0-10): 5  Mood/Affect: Calm, Appropriate         Treatment/Interventions  Areas of Focus: Self-expression, Anxiety reduction, Pain management  Music Therapy Interventions: Active music engagement    Post-assessment  Unable to Assess Reason: Did not provide expressive therapy intervention  0-10 (Numeric) Pain Score: 4  Stress Level (0-10): 5  Anxiety Level (0-10): 5  Coping Level (0-10): 7  Mood/Affect: Calm, Appropriate, Participative  Total Session Time (min): 35 minutes    Narrative  Assessment Detail: Pt found resting in bed, awake/alert, upon arrival of music therapist, with wife at bedside. Pt requesting session at this time.  Plan: Plan to provide active music engagement session as a means of anxiety and pain management, self-expression.  Intervention: During intervention, pt participated by making music selections for 70's rock. Pt sang along with MT during session and spoke of when he used to sing more often. MT encouraged pt to sing and highlighted health benefits.  Evaluation: Pt responded to music therapy intervention by stating \"my mind is doing better\", \"music melts my anxiety away\", and \"I enjoyed that\".  Follow-up: MT will follow-up as applicable.  Patient Comments: Music melts my anxiety away.    Education Documentation  No documentation found.        Expressive Therapies Note  "

## 2025-01-03 NOTE — PROGRESS NOTES
Music Therapy Note    Kenneth Valenzuela was referred by Laura Krishnamurthy, RN.     Therapy Session  Referral Type: New referral this admission  Visit Type: New visit  Session Start Time: 1543  Session End Time: 1549  Intervention Delivery: In-person  Conflict of Service: None  Family Present for Session: None     Pre-assessment  Unable to Assess Reason: Outcomes not assessed  Mood/Affect: Calm, Appropriate         Treatment/Interventions  Music Therapy Interventions: Assessment    Post-assessment  Unable to Assess Reason: Did not provide expressive therapy intervention  Mood/Affect: Calm, Appropriate  Total Session Time (min): 6 minutes    Narrative  Assessment Detail: Pt found resting in bed, awake/alert, upon arrival of music therapist. Pt expressed doing okay today and that he sang in choir for 24 years. Pt receptive to music therapy introduction of services and requesting follow-up session. Pt expressed music preferences for all genres and especially gospel.  Follow-up: MT will follow-up as applicable.    Education Documentation  No documentation found.        Expressive Therapies Note

## 2025-01-03 NOTE — PROGRESS NOTES
Occupational Therapy    OT Treatment    Patient Name: Kenneth Valenzuela  MRN: [id number]  Department: [facility]  Room: [address] [place] [address]  Today's Date: 1/3/2025  Time Calculation  Start Time: 1422  Stop Time: 1459  Time Calculation (min): 37 min        Assessment:  OT Assessment: Pt motivated and tolerated session well. Pt demonstrated STS from chair x3 reps, min functional household distance with mod-min X2 and bed mobility with min A, grooming at chair level with set-up. Continue POC  Prognosis: Good  Barriers to Discharge Home: Caregiver assistance, Cognition needs, Physical needs  Caregiver Assistance: Caregiver assistance needed per identified barriers - however, level of patient's required assistance exceeds assistance available at home  Cognition Needs: 24hr supervision for safety awareness needed, Recollection or understanding of precautions/restrictions limited  Physical Needs: Stair navigation into home limited by function/safety, 24hr mobility assistance needed, 24hr ADL assistance needed, High falls risk due to function or environment  Evaluation/Treatment Tolerance: Patient tolerated treatment well, Patient limited by pain  End of Session Communication: PCT/NA/CTA, Bedside nurse  End of Session Patient Position: Bed, 3 rail up, Alarm on (Call light within reach)  OT Assessment Results: Decreased ADL status, Decreased upper extremity strength, Decreased upper extremity range of motion, Decreased safe judgment during ADL, Decreased endurance, Decreased functional mobility  Prognosis: Good  Barriers to Discharge: Decreased caregiver support  Evaluation/Treatment Tolerance: Patient tolerated treatment well, Patient limited by pain  Plan:  Treatment Interventions: ADL retraining, Endurance training, Functional transfer training  OT Frequency: 3 times per week  OT Discharge Recommendations: Moderate intensity level of continued care  OT Recommended Transfer Status: Assist of 2, Minimal assist, Moderate assist  OT - OK to  "Discharge: Yes (when medically stable)  Treatment Interventions: ADL retraining, Endurance training, Functional transfer training    Subjective   Previous Visit Info:     General:  General  Reason for Referral: impaired mobility  Referred By: Bina PT/OT eval and treat  Past Medical History Relevant to Rehab: HLD, HTN, depression, DM, TBI (basilar skull fx 1970's in pole vaulting accident), ataxia, subdural hematoma w R craniotomy 2023, seizures, behavioral issues  Co-Treatment: PT  Co-Treatment Reason: Maximize pt safety  Patient Position Received: Up in chair, Alarm on  General Comment: agreeable to therapy; MD present at start of session  Precautions:  Medical Precautions: Fall precautions, Seizure precautions  Precautions Comment: tele    Vital Signs (Past 2hrs)        Date/Time Vitals Session Patient Position Pulse Resp SpO2 BP MAP (mmHg)    01/03/25 1322 --  --  131  --  --  --  --     01/03/25 1323 --  --  131  --  --  --  --                         Pain:  Pain Assessment  0-10 (Numeric) Pain Score:  (did ot numerically rate pain, stated, \"I have pain all over.\")  Pain Interventions: Medication (See MAR) (RN notfied)    Objective    Cognition:  Cognition  Orientation Level: Oriented X4  Coordination:  Movements are Fluid and Coordinated: No  Activities of Daily Living: Grooming  Grooming Level of Assistance: Setup  Grooming Where Assessed: Chair  Grooming Comments:  (Able to cross midline to use supplies, required set up opening packages etc. Completed in chair with CGA for sitting balance)  Functional Standing Tolerance:  Functional Standing Tolerance Comments: Able to stand for ~6 minutes throughout session to increase tolerance for ADLs  Bed Mobility/Transfers: Bed Mobility  Bed Mobility: Yes (Sit/supine at EOB with min A and FWW for safety. Pt unable to lower self to EOB in controlled manner, education provided.)    Transfers  Transfer:  (x3 reps of STS from chair to FWW, mod A x2. Stand pivot transfer " from chair to EOB.)      Functional Mobility:  Functional Mobility  Functional Mobility Performed:  (Min househould distance with mod A x2 for ambulation. Pt demo'd ataxia and involuntarly movements. No LOB)  Modalities:          Therapy/Activity: Therapeutic Activity  Therapeutic Activity Performed: Yes (Completed standing activities to increase dynamic standing balance with crossing midline in various heights. No LOB noted, min A for support with gailt belt)        Outcome Measures:[facility] Daily Activity  Putting on and taking off regular lower body clothing: Total  Bathing (including washing, rinsing, drying): A lot  Putting on and taking off regular upper body clothing: A little  Toileting, which includes using toilet, bedpan or urinal: A lot  Taking care of personal grooming such as brushing teeth: A little  Eating Meals: A little  Daily Activity - Total Score: 14        Education Documentation  Precautions, taught by Leela Werner OT at 1/3/2025  2:11 PM.  Learner: Patient  Readiness: Acceptance  Method: Explanation, Demonstration  Response: Verbalizes Understanding  Comment: ADL. balance and fun. mobility safety    ADL Training, taught by Leela Werner OT at 1/3/2025  2:11 PM.  Learner: Patient  Readiness: Acceptance  Method: Explanation, Demonstration  Response: Verbalizes Understanding  Comment: ADL. balance and fun. mobility safety    Education Comments  No comments found.        OP EDUCATION:       Goals:  Encounter Problems       Encounter Problems (Active)       ADLs       Patient will perform UB bathing  with stand by assist level of assistance. (Progressing)       Start:  12/30/24    Expected End:  01/13/25            Patient with complete lower body dressing with minimal assist  level of assistance  (Progressing)       Start:  12/30/24    Expected End:  01/13/25            Patient will complete toileting including hygiene clothing management/hygiene with minimal assist  level of assistance.  (Progressing)       Start:  12/30/24    Expected End:  01/13/25               TRANSFERS       Patient will complete functional transfers with stand by assist level of assistance. (Progressing)       Start:  12/30/24    Expected End:  01/13/25

## 2025-01-03 NOTE — PROGRESS NOTES
Physical Therapy    Physical Therapy Treatment    Patient Name: Kenneth Valenzuela  MRN: [id number]  Department: [facility]  Room: [address] [place] [address]  Today's Date: 1/3/2025  Time Calculation  Start Time: 1323  Stop Time: 1359  Time Calculation (min): 36 min         Assessment/Plan   patient very impulsive and needed frequent redirection to stay on task/return to task and due to inappropriate talk with therapists.  Patient wanting to ambulate to hallway today but poor gait pattern and high risk of falls.  Patient with significant pain in L knee and back at times.  Patient continues to have poor safety awareness with transfers and needs cues to try and increase safety (hand placement, use of device).  Cues to slow down and to reduce impulsivity to reduce fall risk.    PT Assessment  Evaluation/Treatment Tolerance: Patient limited by pain, Patient limited by fatigue  End of Session Communication: PCT/NA/CTA (communication on white board)  End of Session Patient Position: Bed, 3 rail up, Alarm on (call light in reach)  PT Plan  Inpatient/Swing Bed or Outpatient: Inpatient  PT Plan  Treatment/Interventions: Bed mobility, Transfer training, Stair training, Gait training, Balance training, Neuromuscular re-education, Strengthening, Endurance training, Range of motion, Therapeutic exercise, Therapeutic activity, Home exercise program  PT Plan: Ongoing PT  PT Frequency: 3 times per week  PT Discharge Recommendations: Moderate intensity level of continued care, 24 hr supervision due to cognition  PT Recommended Transfer Status: Assist x1  PT - OK to Discharge: Yes (PT eval complete, ok to d/c once deemed medically appropriate)      General Visit Information:   PT  Visit  PT Received On: 01/03/25  General  Reason for Referral: impaired mobility  Referred By: Bina PT/OT eval and treat  Past Medical History Relevant to Rehab: HLD, HTN, depression, DM, TBI (basilar skull fx 1970's in pole vaulting accident), ataxia, subdural hematoma w R  "craniotomy 2023, seizures, behavioral issues  Family/Caregiver Present: No  Co-Treatment: OT  Co-Treatment Reason: Maximize pt safety with mobility while addressing discipline-specific goals  Prior to Session Communication: Bedside nurse  Patient Position Received: Alarm on, Up in chair (chair alarm not paired to call system)  General Comment: patient awake and agreeable to session.  NP Yaquelin present at start of session.    Subjective   Precautions:  Precautions  Medical Precautions: Fall precautions, Seizure precautions    Vital Signs (Past 2hrs)        Date/Time Vitals Session Patient Position Pulse Resp SpO2 BP MAP (mmHg)    01/03/25 1323 --  --  131  --  --  --  --                         Objective   Pain:  Pain Assessment  Pain Assessment: 0-10  0-10 (Numeric) Pain Score:  (no number given but states its \"everywhere\" from his arthritis; did ask for his pain patch to be reapplied to his back-notified PCA as bed nurse at lunch and PCA states it was reapplied earlier)  Cognition:  Cognition  Orientation Level: Oriented X4  Insight: Moderate  Impulsive: Severely  Flexibility of Thought: Reduced flexibility  Coordination:  Movements are Fluid and Coordinated: No (h/o TBI, involuntary movements/ataxic)  Postural Control:  Postural Control  Postural Control: Impaired  Static Sitting Balance  Static Sitting-Balance Support: Feet unsupported, Bilateral upper extremity supported  Static Sitting-Level of Assistance: Moderate assistance (at times SBA)  Dynamic Sitting Balance  Dynamic Sitting-Balance Support: Feet supported, Bilateral upper extremity supported  Dynamic Sitting-Level of Assistance: Contact guard, Minimum assistance (up to modA at times with posterior LOB)    Activity Tolerance:  Activity Tolerance  Endurance: Tolerates 10 - 20 min exercise with multiple rests  Treatments:  Therapeutic Exercise  Therapeutic Exercise Performed: Yes  Therapeutic Exercise Activity 1: seated heel-toe raises  Therapeutic Exercise " Activity 2: seated LAQ with balance support    Therapeutic Activity  Therapeutic Activity Performed: Yes  Therapeutic Activity 1: seated balance on EOC with CGA to modA for LOB  Therapeutic Activity 2: standing balance at FWW with UE activity up to modA for balance  Therapeutic Activity 3: sit to/from stand from recliner and bed with modA and cues and assist for hand placement/safety    Bed Mobility  Bed Mobility: Yes  Bed Mobility 1  Bed Mobility 1: Sitting to supine  Level of Assistance 1: Moderate assistance (for LEs to lift up onto bed)  Bed Mobility Comments 1: patient having difficulty scooting up in bed to PT/OT dependent scooted him up with draw pad    Ambulation/Gait Training  Ambulation/Gait Training Performed: Yes  Ambulation/Gait Training 1  Surface 1: Level tile  Device 1: Rolling walker  Gait Support Devices: Gait belt  Assistance 1: Moderate assistance, Moderate verbal cues  Quality of Gait 1: Diminished heel strike, Narrow base of support, Shuffling gait, Inconsistent stride length, Decreased step length (step to pattern with L leg leading, R leg dragging behind at times.  R foot not always advancing to L foot; R foot turned outward and R knee flexed)  Comments/Distance (ft) 1: ataxic, uncontrolled movements, very impulsive and poor safety awareness; 35'  Transfers  Transfer: Yes  Transfer 1  Technique 1: Sit to stand, Stand to sit  Transfer Device 1: Gait belt (FWW)  Transfer Level of Assistance 1: Moderate assistance, Moderate verbal cues, Maximum verbal cues  Trials/Comments 1: edu/cues for hand placement, safety;  poor eccentric control  Transfers 2  Technique 2: Stand pivot  Transfer Device 2: Walker, Gait belt (FWW)  Transfer Level of Assistance 2: Minimum assistance, Maximum verbal cues  Trials/Comments 2: chair to bed.  holding walker as sitting down and poor eccentric control with sitting    Outcome Measures:  [facility] Basic Mobility  Turning from your back to your side while in a flat bed  without using bedrails: A lot  Moving from lying on your back to sitting on the side of a flat bed without using bedrails: A lot  Moving to and from bed to chair (including a wheelchair): A lot  Standing up from a chair using your arms (e.g. wheelchair or bedside chair): A lot  To walk in hospital room: A little  Climbing 3-5 steps with railing: Total  Basic Mobility - Total Score: 12    Education Documentation  Mobility Training, taught by Genet Loza, PT at 1/3/2025  2:24 PM.  Learner: Patient  Readiness: Acceptance  Method: Explanation, Demonstration  Response: Verbalizes Understanding, Demonstrated Understanding, Needs Reinforcement  Comment: safety and hand placement with transfers ,mobility, use of device    Education Comments  No comments found.        OP EDUCATION:       Encounter Problems       Encounter Problems (Active)       PT Problem       Pt will demo stand pivot transfer and sit > stand > sit transfer with FWW and CGA  (Progressing)       Start:  12/30/24    Expected End:  01/13/25            Pt will ambulate 3' with FWW and CGA, without LOB  (Progressing)       Start:  12/30/24    Expected End:  01/13/25            Pt will sit EOB x5 minutes with supervision while participating in therex program to demonstrate improved trunk control (Progressing)       Start:  12/30/24    Expected End:  01/13/25            Pt will demonstrate ability to tolerate 8 minutes of seated or standing therapeutic exercise with 4 or less rest breaks to demonstrate improved activity tolerance.  (Progressing)       Start:  12/30/24    Expected End:  01/13/25               Pain - Adult

## 2025-01-03 NOTE — CARE PLAN
The patient's goals for the shift include gain strength    The clinical goals for the shift include monitor vs and labs    Over the shift, the patient did not make progress toward the following goals. Barriers to progression include  n/a . Recommendations to address these barriers include continue plan of care.    Problem: Skin  Goal: Promote skin healing  Flowsheets (Taken 1/2/2025 2054)  Promote skin healing: Turn/reposition every 2 hours/use positioning/transfer devices     Problem: Diabetes  Goal: Achieve decreasing blood glucose levels by end of shift  Flowsheets (Taken 1/2/2025 2054)  Achieve decreasing blood glucose levels by end of shift: Self monitor blood glucose with staff oversight     Problem: Pain - Adult  Goal: Verbalizes/displays adequate comfort level or baseline comfort level  Flowsheets (Taken 1/2/2025 2054)  Verbalizes/displays adequate comfort level or baseline comfort level: Assess pain using appropriate pain scale     Problem: Safety - Adult  Goal: Free from fall injury  Flowsheets (Taken 1/2/2025 2054)  Free from fall injury: Based on caregiver fall risk screen, instruct family/caregiver to ask for assistance with transferring infant if caregiver noted to have fall risk factors     Problem: Chronic Conditions and Co-morbidities  Goal: Patient's chronic conditions and co-morbidity symptoms are monitored and maintained or improved  Flowsheets (Taken 1/2/2025 2054)  Care Plan - Patient's Chronic Conditions and Co-Morbidity Symptoms are Monitored and Maintained or Improved: Monitor and assess patient's chronic conditions and comorbid symptoms for stability, deterioration, or improvement     Problem: Fall/Injury  Goal: Not fall by end of shift  Outcome: Progressing

## 2025-01-04 LAB
GLUCOSE BLD MANUAL STRIP-MCNC: 165 MG/DL (ref 74–99)
GLUCOSE BLD MANUAL STRIP-MCNC: 165 MG/DL (ref 74–99)
GLUCOSE BLD MANUAL STRIP-MCNC: 249 MG/DL (ref 74–99)
GLUCOSE BLD MANUAL STRIP-MCNC: 279 MG/DL (ref 74–99)

## 2025-01-04 PROCEDURE — 2500000005 HC RX 250 GENERAL PHARMACY W/O HCPCS: Performed by: NURSE PRACTITIONER

## 2025-01-04 PROCEDURE — 1100000001 HC PRIVATE ROOM DAILY

## 2025-01-04 PROCEDURE — 2500000001 HC RX 250 WO HCPCS SELF ADMINISTERED DRUGS (ALT 637 FOR MEDICARE OP): Performed by: NURSE PRACTITIONER

## 2025-01-04 PROCEDURE — 2500000001 HC RX 250 WO HCPCS SELF ADMINISTERED DRUGS (ALT 637 FOR MEDICARE OP)

## 2025-01-04 PROCEDURE — 2500000002 HC RX 250 W HCPCS SELF ADMINISTERED DRUGS (ALT 637 FOR MEDICARE OP, ALT 636 FOR OP/ED)

## 2025-01-04 PROCEDURE — 82947 ASSAY GLUCOSE BLOOD QUANT: CPT

## 2025-01-04 PROCEDURE — 99232 SBSQ HOSP IP/OBS MODERATE 35: CPT | Performed by: INTERNAL MEDICINE

## 2025-01-04 PROCEDURE — 2500000004 HC RX 250 GENERAL PHARMACY W/ HCPCS (ALT 636 FOR OP/ED)

## 2025-01-04 PROCEDURE — 97110 THERAPEUTIC EXERCISES: CPT | Mod: GP,CQ | Performed by: PHYSICAL THERAPY ASSISTANT

## 2025-01-04 PROCEDURE — 97116 GAIT TRAINING THERAPY: CPT | Mod: GP,CQ | Performed by: PHYSICAL THERAPY ASSISTANT

## 2025-01-04 RX ADMIN — MODAFINIL 200 MG: 100 TABLET ORAL at 09:02

## 2025-01-04 RX ADMIN — QUETIAPINE FUMARATE 25 MG: 25 TABLET ORAL at 20:58

## 2025-01-04 RX ADMIN — LEVETIRACETAM 500 MG: 500 TABLET, FILM COATED ORAL at 09:01

## 2025-01-04 RX ADMIN — QUETIAPINE FUMARATE 25 MG: 25 TABLET ORAL at 09:02

## 2025-01-04 RX ADMIN — FLUOROMETHOLONE 1 DROP: 1 SOLUTION/ DROPS OPHTHALMIC at 09:02

## 2025-01-04 RX ADMIN — Medication 2000 UNITS: at 09:00

## 2025-01-04 RX ADMIN — FENOFIBRATE 160 MG: 160 TABLET ORAL at 09:07

## 2025-01-04 RX ADMIN — QUETIAPINE FUMARATE 25 MG: 25 TABLET ORAL at 14:50

## 2025-01-04 RX ADMIN — LIDOCAINE 2 PATCH: 4 PATCH TOPICAL at 09:02

## 2025-01-04 RX ADMIN — ENOXAPARIN SODIUM 40 MG: 40 INJECTION SUBCUTANEOUS at 18:28

## 2025-01-04 RX ADMIN — IBUPROFEN 600 MG: 600 TABLET, FILM COATED ORAL at 05:34

## 2025-01-04 RX ADMIN — ACETAMINOPHEN 975 MG: 325 TABLET ORAL at 20:58

## 2025-01-04 RX ADMIN — LOSARTAN POTASSIUM 25 MG: 25 TABLET, FILM COATED ORAL at 09:01

## 2025-01-04 RX ADMIN — ACETAMINOPHEN 975 MG: 325 TABLET ORAL at 09:00

## 2025-01-04 RX ADMIN — IBUPROFEN 600 MG: 600 TABLET, FILM COATED ORAL at 18:28

## 2025-01-04 RX ADMIN — IBUPROFEN 600 MG: 600 TABLET, FILM COATED ORAL at 12:25

## 2025-01-04 RX ADMIN — HYDROCODONE BITARTRATE AND ACETAMINOPHEN 2 TABLET: 5; 325 TABLET ORAL at 00:29

## 2025-01-04 RX ADMIN — FLUTICASONE PROPIONATE 1 SPRAY: 50 SPRAY, METERED NASAL at 09:02

## 2025-01-04 RX ADMIN — LEVETIRACETAM 500 MG: 500 TABLET, FILM COATED ORAL at 20:58

## 2025-01-04 RX ADMIN — FLUOROMETHOLONE 1 DROP: 1 SOLUTION/ DROPS OPHTHALMIC at 20:58

## 2025-01-04 RX ADMIN — TAMSULOSIN HYDROCHLORIDE 0.4 MG: 0.4 CAPSULE ORAL at 09:02

## 2025-01-04 RX ADMIN — DULOXETINE HYDROCHLORIDE 30 MG: 60 CAPSULE, DELAYED RELEASE ORAL at 09:00

## 2025-01-04 RX ADMIN — DULOXETINE HYDROCHLORIDE 60 MG: 60 CAPSULE, DELAYED RELEASE ORAL at 09:06

## 2025-01-04 RX ADMIN — IBUPROFEN 600 MG: 600 TABLET, FILM COATED ORAL at 23:59

## 2025-01-04 RX ADMIN — HYDROCODONE BITARTRATE AND ACETAMINOPHEN 2 TABLET: 5; 325 TABLET ORAL at 14:50

## 2025-01-04 RX ADMIN — FLUOROMETHOLONE 1 DROP: 1 SOLUTION/ DROPS OPHTHALMIC at 14:55

## 2025-01-04 RX ADMIN — OXYCODONE HYDROCHLORIDE AND ACETAMINOPHEN 1000 MG: 500 TABLET ORAL at 08:59

## 2025-01-04 RX ADMIN — POLYETHYLENE GLYCOL 3350 17 G: 17 POWDER, FOR SOLUTION ORAL at 09:08

## 2025-01-04 RX ADMIN — HYDROCODONE BITARTRATE AND ACETAMINOPHEN 2 TABLET: 5; 325 TABLET ORAL at 23:23

## 2025-01-04 RX ADMIN — METOPROLOL SUCCINATE 25 MG: 25 TABLET, EXTENDED RELEASE ORAL at 09:01

## 2025-01-04 ASSESSMENT — COGNITIVE AND FUNCTIONAL STATUS - GENERAL
DAILY ACTIVITIY SCORE: 19
TURNING FROM BACK TO SIDE WHILE IN FLAT BAD: A LOT
PERSONAL GROOMING: A LITTLE
CLIMB 3 TO 5 STEPS WITH RAILING: A LOT
MOVING TO AND FROM BED TO CHAIR: A LOT
STANDING UP FROM CHAIR USING ARMS: A LOT
MOVING FROM LYING ON BACK TO SITTING ON SIDE OF FLAT BED WITH BEDRAILS: TOTAL
TOILETING: A LITTLE
WALKING IN HOSPITAL ROOM: A LOT
DRESSING REGULAR UPPER BODY CLOTHING: A LITTLE
STANDING UP FROM CHAIR USING ARMS: A LOT
DRESSING REGULAR LOWER BODY CLOTHING: A LITTLE
TURNING FROM BACK TO SIDE WHILE IN FLAT BAD: A LITTLE
MOBILITY SCORE: 15
WALKING IN HOSPITAL ROOM: A LOT
MOVING TO AND FROM BED TO CHAIR: A LOT
CLIMB 3 TO 5 STEPS WITH RAILING: TOTAL
MOBILITY SCORE: 10
HELP NEEDED FOR BATHING: A LITTLE

## 2025-01-04 ASSESSMENT — PAIN SCALES - GENERAL
PAINLEVEL_OUTOF10: 7
PAINLEVEL_OUTOF10: 5 - MODERATE PAIN
PAINLEVEL_OUTOF10: 7
PAINLEVEL_OUTOF10: 2
PAINLEVEL_OUTOF10: 4
PAINLEVEL_OUTOF10: 0 - NO PAIN
PAINLEVEL_OUTOF10: 8
PAINLEVEL_OUTOF10: 10 - WORST POSSIBLE PAIN

## 2025-01-04 ASSESSMENT — PAIN - FUNCTIONAL ASSESSMENT
PAIN_FUNCTIONAL_ASSESSMENT: 0-10

## 2025-01-04 ASSESSMENT — PAIN DESCRIPTION - ORIENTATION: ORIENTATION: LOWER

## 2025-01-04 ASSESSMENT — PAIN DESCRIPTION - LOCATION
LOCATION: BACK
LOCATION: BACK

## 2025-01-04 ASSESSMENT — PAIN DESCRIPTION - DESCRIPTORS
DESCRIPTORS: ACHING
DESCRIPTORS: ACHING

## 2025-01-04 NOTE — PROGRESS NOTES
Physical Therapy    Physical Therapy    Physical Therapy Treatment    Patient Name: Kenneth Valenzuela  MRN: [id number]  Today's Date: 1/4/2025  Time Calculation  Start Time: 1000  Stop Time: 1020  Time Calculation (min): 20 min     303/303-A    Assessment/Plan   PT Assessment  PT Assessment Results: Decreased strength, Decreased range of motion, Decreased endurance, Impaired balance, Decreased mobility, Impaired judgement, Decreased safety awareness, Pain  Rehab Prognosis: Fair  Barriers to Discharge Home: Cognition needs, Caregiver assistance  Caregiver Assistance: Caregiver assistance needed per identified barriers - however, level of patient's required assistance exceeds assistance available at home  Cognition Needs: 24hr supervision for safety awareness needed  Evaluation/Treatment Tolerance: Patient limited by pain, Patient limited by fatigue  End of Session Communication: PCT/NA/CTA (communication on white board)  Assessment Comment: Pt. up in chair upon arrival.  Agreeable to therapy.  Pt. tolerated most exercises well.  Pt. c/o increased left hip pain with trying heel slides.  Poor eccentric control noted with SAQ's.  Frequent breaks needed during session.  End of Session Patient Position: Bed, 3 rail up, Alarm on (call light in reach)  PT Plan  Inpatient/Swing Bed or Outpatient: Inpatient  PT Plan  Treatment/Interventions: Bed mobility, Transfer training, Stair training, Gait training, Balance training, Neuromuscular re-education, Strengthening, Endurance training, Range of motion, Therapeutic exercise, Therapeutic activity, Home exercise program  PT Plan: Ongoing PT  PT Frequency: 3 times per week  PT Discharge Recommendations: Moderate intensity level of continued care, 24 hr supervision due to cognition  PT Recommended Transfer Status: Assist x1  PT - OK to Discharge: Yes (PT eval complete, ok to d/c once deemed medically appropriate)    Current Problem:  Patient Active Problem List   Diagnosis    Allergic  rhinitis    Ataxia    Benign prostatic hyperplasia with lower urinary tract symptoms    Bilateral renal stones    Cervical arthritis with myelopathy    Chronic pain syndrome    Controlled type 2 diabetes mellitus without complication, without long-term current use of insulin (Multi)    Depression, major, single episode, moderate (Multi)    Falls frequently    Hyperlipidemia    Hypertension    Impingement syndrome of left shoulder    Male erectile disorder    Primary osteoarthritis of both hips    Primary osteoarthritis of left shoulder    Seizure (Multi)    Subdural hematoma (Multi)    Unspecified intracranial injury with loss of consciousness greater than 24 hours with return to pre-existing conscious level, initial encounter (Multi)    Balance disturbance due to old head injury    Behavior-irritability    Cognitive deficit as late effect of traumatic brain injury    Diabetic polyneuropathy associated with type 2 diabetes mellitus (Multi)    Difficulty controlling behavior as late effect traumatic brain injury    Hip pain, left    Hypercalcemia    Migraine without aura and without status migrainosus, not intractable    Median nerve entrapment    Class 1 obesity due to excess calories with serious comorbidity and body mass index (BMI) of 31.0 to 31.9 in adult    Tachycardia    Thoracic back pain    Generalized weakness    Traumatic retroperitoneal hematoma    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 Patient reports needing to use commode.  Reports 8/10 low back pain.  Aides came into assist therapist with transfer to bedside commode.    Precautions:  Precautions  Medical  Precautions: Fall precautions, Seizure precautions    Vital Signs:  Vital Signs  Heart Rate: (!) 122  SpO2: 99 %  Objective   Patient needing Mod A x 2 supine to sit EOB, EOB to stand pivot to bedside commode.  Bedside commode to bedside chair transfer.  Patient performed seated LAQ, march steps, heel/toe raises x 10 reps each.    Pain:  Pain Assessment  Pain Assessment: 0-10  0-10 (Numeric) Pain Score: 8  Pain Location: Back    Cognition:       Postural Control:       Extremity/Trunk Assessments:                Activity Tolerance:       Treatments:                            Outcome Measures:     [facility] Basic Mobility  Turning from your back to your side while in a flat bed without using bedrails: Total  Moving from lying on your back to sitting on the side of a flat bed without using bedrails: A lot  Moving to and from bed to chair (including a wheelchair): A lot  Standing up from a chair using your arms (e.g. wheelchair or bedside chair): A lot  To walk in hospital room: A lot  Climbing 3-5 steps with railing: Total  Basic Mobility - Total Score: 10                                      Education Documentation  No documentation found.  Education Comments  No comments found.           EDUCATION:     Encounter Problems       Encounter Problems (Active)       PT Problem       Pt will demo stand pivot transfer and sit > stand > sit transfer with FWW and CGA  (Progressing)       Start:  12/30/24    Expected End:  01/13/25            Pt will ambulate 3' with FWW and CGA, without LOB  (Progressing)       Start:  12/30/24    Expected End:  01/13/25            Pt will sit EOB x5 minutes with supervision while participating in therex program to demonstrate improved trunk control (Progressing)       Start:  12/30/24    Expected End:  01/13/25            Pt will demonstrate ability to tolerate 8 minutes of seated or standing therapeutic exercise with 4 or less rest breaks to demonstrate improved activity tolerance.   (Progressing)       Start:  12/30/24    Expected End:  01/13/25               Pain - Adult

## 2025-01-04 NOTE — ASSESSMENT & PLAN NOTE
- Continue PT and OT  - Social work consult to assist with discharge planning; looking at [facility] pending pre-CERT

## 2025-01-04 NOTE — ASSESSMENT & PLAN NOTE
- Continue PT and OT  History of traumatic brain injury/status postcraniotomy/seizure disorder.  - Continue Keppra, Seroquel, Cymbalta  - Because the patient's history of TBI he does have agitation and behaviors.   Patient on Keppra, Provigil, Seroquel, Cymbalta.  - Seizure precautions in place    Chronic pain syndrome  - Tylenol scheduled around-the-clock  - Continue home Norco.  Morphine for severe pain breakthrough pain.    Depression  - Continue Cymbalta and Seroquel    BPH  - tamsulosin 0.4 mg p.o. daily     Essential hypertension  - Losartan 25 mg p.o. daily  - Metoprolol 25 mg daily, patient has been tachycardic up to 125.  I will increase the dose to 25 mg twice daily and monitor heart rate accordingly blood pressures ranged from 104/64 to 167/93, elevated blood pressures occurred to be in the morning before medications are given.   - Telemetry monitoring       Hypertryglycerdiemia  - fenofibrate 160mg po daily      Bowel regimen: miralax   Dvt prophylaxis: lovenox subcu   Dispo: Medically ready for discharge, awaiting pre-CERT for SNF placement - [facility] pending pre-CERT.

## 2025-01-04 NOTE — PROGRESS NOTES
Pt reviewed in care rounds this morning. Pt medically ready for discharge.  Plan is for Pt to discharge to [facility] submitted precert. SW to follow for precert approval and discharge.      01/04/25 1704   Discharge Planning   Expected Discharge Disposition SNF  ([facility] - precert submitted)   Stroke Family Assessment   Stroke Family Assessment Needed No   Intensity of Service   Intensity of Service 0-30 min

## 2025-01-04 NOTE — PROGRESS NOTES
Kenneth Valenzuela is a 64 y.o. male on day 6 of admission presenting with Generalized weakness.      Subjective   Patient seen and examined earlier this morning    Voicing no complaints       Objective     Last Recorded Vitals  BP (!) 161/91 (BP Location: Left arm, Patient Position: Lying)   Pulse (!) 122   Temp 36.3 °C (97.3 °F) (Temporal)   Resp 20   Wt 82.7 kg (182 lb 5.1 oz)   SpO2 99%   Intake/Output last 3 Shifts:    Intake/Output Summary (Last 24 hours) at 1/4/2025 1217  Last data filed at 1/4/2025 0900  Gross per 24 hour   Intake 960 ml   Output 1275 ml   Net -315 ml       Admission Weight  Weight: 86.2 kg (190 lb) (12/29/24 1118)    Daily Weight  12/29/24 : 82.7 kg (182 lb 5.1 oz)    Image Results      Physical Exam    General Appearance: AAO x 3, not in acute distress and no respiratory distress  Skin: skin color, texture, turgor normal; no suspicious rashes or lesions  Eyes : PERRL, EOM's intact, conjunctiva pink  ENT: no oral thrush, no pharyngeal erythema or exudates  Neck: no JVD, no lymphadenopathy  Respiratory: lungs CTA, no rhonchi, wheezing, or crackles  Heart: RRR without murmur, gallop, or rubs, no ectopy  Abdomen: Nondistended, positive bowel sounds, soft,  nontender  Extremities: no edema, ROM x 4 extremities  Peripheral pulses: normal and present x 4 extremities  Neuro: alert, coherent and conversant, no focal motor deficits; does have thickened speech.    Relevant Results               Assessment/Plan      64-year-old male admitted for falls and generalized weakness.  He has a history of ataxia as well as traumatic brain injury age 16 from a pole vaulting accident, status postcraniotomy for subdural hematoma 2023, behavioral issues, type 2 diabetes, history of kidney stones bilaterally, BPH, chronic pain, type 2 diabetes, hyperlipidemia and seizure disorder      Assessment & Plan  Falls frequently  - Continue PT and OT  - Social work consult to assist with discharge planning; looking at [facility] pending pre-CERT    Generalized weakness  - Continue PT and OT  History of traumatic brain injury/status postcraniotomy/seizure disorder.  - Continue Keppra, Seroquel, Cymbalta  - Because the patient's history of TBI he does have agitation and behaviors.   Patient on Keppra, Provigil, Seroquel, Cymbalta.  - Seizure precautions in place    Chronic pain syndrome  - Tylenol scheduled around-the-clock  - Continue home Norco.  Morphine for severe pain breakthrough pain.    Depression  - Continue Cymbalta and Seroquel    BPH  - tamsulosin 0.4 mg p.o. daily     Essential hypertension  - Losartan 25 mg p.o. daily  - Metoprolol 25 mg daily, patient has been tachycardic up to 125.  I will increase the dose to 25 mg twice daily and monitor heart rate accordingly blood pressures ranged from 104/64 to 167/93, elevated blood pressures occurred to be in the morning before medications are given.   - Telemetry monitoring       Hypertryglycerdiemia  - fenofibrate 160mg po daily      Bowel regimen: miralax   Dvt prophylaxis: lovenox subcu   Dispo: Medically ready for discharge, awaiting pre-CERT for SNF placement - [facility] pending pre-CERT.            Zak Tai DO

## 2025-01-04 NOTE — CARE PLAN
The patient's goals for the shift include gain strength feel better    The clinical goals for the shift include monitor labs/v/s no falls, labs and vs wdl

## 2025-01-04 NOTE — CARE PLAN
The patient's goals for the shift include gain strength    The clinical goals for the shift include monitor labs/v/s    Over the shift, the patient did not make progress toward the following goals. Barriers to progression include n/a. Recommendations to address these barriers include continue plan of care.    Problem: Diabetes  Goal: Vital signs within normal range for age by end of shift  Outcome: Progressing     Problem: Pain - Adult  Goal: Verbalizes/displays adequate comfort level or baseline comfort level  Flowsheets (Taken 1/4/2025 0046)  Verbalizes/displays adequate comfort level or baseline comfort level: Encourage patient to monitor pain and request assistance     Problem: Nutrition  Goal: Oral intake greater than 50%  Outcome: Progressing  Goal: BG  mg/dL  Outcome: Progressing  Goal: Lab values WNL  Outcome: Progressing  Goal: Electrolytes WNL  Outcome: Progressing

## 2025-01-05 VITALS
OXYGEN SATURATION: 96 % | BODY MASS INDEX: 29.3 KG/M2 | HEIGHT: 66 IN | DIASTOLIC BLOOD PRESSURE: 83 MMHG | SYSTOLIC BLOOD PRESSURE: 131 MMHG | HEART RATE: 109 BPM | RESPIRATION RATE: 20 BRPM | TEMPERATURE: 97.5 F | WEIGHT: 182.32 LBS

## 2025-01-05 LAB
GLUCOSE BLD MANUAL STRIP-MCNC: 160 MG/DL (ref 74–99)
GLUCOSE BLD MANUAL STRIP-MCNC: 182 MG/DL (ref 74–99)
GLUCOSE BLD MANUAL STRIP-MCNC: 185 MG/DL (ref 74–99)
GLUCOSE BLD MANUAL STRIP-MCNC: 259 MG/DL (ref 74–99)
HOLD SPECIMEN: NORMAL
POTASSIUM SERPL-SCNC: 4.1 MMOL/L (ref 3.5–5.3)

## 2025-01-05 PROCEDURE — 82947 ASSAY GLUCOSE BLOOD QUANT: CPT

## 2025-01-05 PROCEDURE — 2500000001 HC RX 250 WO HCPCS SELF ADMINISTERED DRUGS (ALT 637 FOR MEDICARE OP)

## 2025-01-05 PROCEDURE — 2500000001 HC RX 250 WO HCPCS SELF ADMINISTERED DRUGS (ALT 637 FOR MEDICARE OP): Performed by: NURSE PRACTITIONER

## 2025-01-05 PROCEDURE — 84132 ASSAY OF SERUM POTASSIUM: CPT | Performed by: NURSE PRACTITIONER

## 2025-01-05 PROCEDURE — 2500000002 HC RX 250 W HCPCS SELF ADMINISTERED DRUGS (ALT 637 FOR MEDICARE OP, ALT 636 FOR OP/ED)

## 2025-01-05 PROCEDURE — 2500000005 HC RX 250 GENERAL PHARMACY W/O HCPCS: Performed by: NURSE PRACTITIONER

## 2025-01-05 PROCEDURE — 36415 COLL VENOUS BLD VENIPUNCTURE: CPT | Performed by: NURSE PRACTITIONER

## 2025-01-05 PROCEDURE — 99232 SBSQ HOSP IP/OBS MODERATE 35: CPT | Performed by: NURSE PRACTITIONER

## 2025-01-05 PROCEDURE — 36415 COLL VENOUS BLD VENIPUNCTURE: CPT | Performed by: STUDENT IN AN ORGANIZED HEALTH CARE EDUCATION/TRAINING PROGRAM

## 2025-01-05 PROCEDURE — 2500000004 HC RX 250 GENERAL PHARMACY W/ HCPCS (ALT 636 FOR OP/ED)

## 2025-01-05 PROCEDURE — 1100000001 HC PRIVATE ROOM DAILY

## 2025-01-05 RX ADMIN — HYDROCODONE BITARTRATE AND ACETAMINOPHEN 2 TABLET: 5; 325 TABLET ORAL at 12:54

## 2025-01-05 RX ADMIN — FENOFIBRATE 160 MG: 160 TABLET ORAL at 09:49

## 2025-01-05 RX ADMIN — IBUPROFEN 600 MG: 600 TABLET, FILM COATED ORAL at 12:54

## 2025-01-05 RX ADMIN — LIDOCAINE 2 PATCH: 4 PATCH TOPICAL at 09:48

## 2025-01-05 RX ADMIN — FLUOROMETHOLONE 1 DROP: 1 SOLUTION/ DROPS OPHTHALMIC at 21:15

## 2025-01-05 RX ADMIN — OXYCODONE HYDROCHLORIDE AND ACETAMINOPHEN 1000 MG: 500 TABLET ORAL at 09:49

## 2025-01-05 RX ADMIN — ACETAMINOPHEN 975 MG: 325 TABLET ORAL at 09:49

## 2025-01-05 RX ADMIN — METOPROLOL SUCCINATE 25 MG: 25 TABLET, EXTENDED RELEASE ORAL at 09:49

## 2025-01-05 RX ADMIN — FLUOROMETHOLONE 1 DROP: 1 SOLUTION/ DROPS OPHTHALMIC at 15:13

## 2025-01-05 RX ADMIN — FLUOROMETHOLONE 1 DROP: 1 SOLUTION/ DROPS OPHTHALMIC at 09:48

## 2025-01-05 RX ADMIN — FLUTICASONE PROPIONATE 1 SPRAY: 50 SPRAY, METERED NASAL at 09:48

## 2025-01-05 RX ADMIN — LOSARTAN POTASSIUM 25 MG: 25 TABLET, FILM COATED ORAL at 09:49

## 2025-01-05 RX ADMIN — LEVETIRACETAM 500 MG: 500 TABLET, FILM COATED ORAL at 09:49

## 2025-01-05 RX ADMIN — QUETIAPINE FUMARATE 25 MG: 25 TABLET ORAL at 21:15

## 2025-01-05 RX ADMIN — IBUPROFEN 600 MG: 600 TABLET, FILM COATED ORAL at 06:13

## 2025-01-05 RX ADMIN — HYDROCODONE BITARTRATE AND ACETAMINOPHEN 2 TABLET: 5; 325 TABLET ORAL at 21:15

## 2025-01-05 RX ADMIN — MODAFINIL 200 MG: 100 TABLET ORAL at 09:49

## 2025-01-05 RX ADMIN — DULOXETINE HYDROCHLORIDE 60 MG: 60 CAPSULE, DELAYED RELEASE ORAL at 09:53

## 2025-01-05 RX ADMIN — LEVETIRACETAM 500 MG: 500 TABLET, FILM COATED ORAL at 21:15

## 2025-01-05 RX ADMIN — DULOXETINE HYDROCHLORIDE 30 MG: 60 CAPSULE, DELAYED RELEASE ORAL at 09:49

## 2025-01-05 RX ADMIN — Medication 2000 UNITS: at 09:49

## 2025-01-05 RX ADMIN — QUETIAPINE FUMARATE 25 MG: 25 TABLET ORAL at 09:49

## 2025-01-05 RX ADMIN — TAMSULOSIN HYDROCHLORIDE 0.4 MG: 0.4 CAPSULE ORAL at 09:49

## 2025-01-05 RX ADMIN — IBUPROFEN 600 MG: 600 TABLET, FILM COATED ORAL at 17:50

## 2025-01-05 RX ADMIN — ENOXAPARIN SODIUM 40 MG: 40 INJECTION SUBCUTANEOUS at 17:50

## 2025-01-05 RX ADMIN — POLYETHYLENE GLYCOL 3350 17 G: 17 POWDER, FOR SOLUTION ORAL at 12:54

## 2025-01-05 RX ADMIN — QUETIAPINE FUMARATE 25 MG: 25 TABLET ORAL at 15:13

## 2025-01-05 ASSESSMENT — COGNITIVE AND FUNCTIONAL STATUS - GENERAL
PERSONAL GROOMING: A LITTLE
TURNING FROM BACK TO SIDE WHILE IN FLAT BAD: A LOT
DRESSING REGULAR UPPER BODY CLOTHING: A LITTLE
MOBILITY SCORE: 14
DAILY ACTIVITIY SCORE: 18
WALKING IN HOSPITAL ROOM: A LOT
DRESSING REGULAR LOWER BODY CLOTHING: A LITTLE
MOVING TO AND FROM BED TO CHAIR: A LOT
HELP NEEDED FOR BATHING: A LOT
CLIMB 3 TO 5 STEPS WITH RAILING: A LOT
STANDING UP FROM CHAIR USING ARMS: A LOT
TOILETING: A LITTLE

## 2025-01-05 ASSESSMENT — PAIN SCALES - GENERAL
PAINLEVEL_OUTOF10: 4
PAINLEVEL_OUTOF10: 7
PAINLEVEL_OUTOF10: 10 - WORST POSSIBLE PAIN
PAINLEVEL_OUTOF10: 5 - MODERATE PAIN
PAINLEVEL_OUTOF10: 6

## 2025-01-05 ASSESSMENT — PAIN SCALES - WONG BAKER: WONGBAKER_NUMERICALRESPONSE: NO HURT

## 2025-01-05 ASSESSMENT — PAIN - FUNCTIONAL ASSESSMENT: PAIN_FUNCTIONAL_ASSESSMENT: 0-10

## 2025-01-05 ASSESSMENT — PAIN DESCRIPTION - LOCATION
LOCATION: BACK
LOCATION: BACK

## 2025-01-05 NOTE — PROGRESS NOTES
Kenneth Valenzuela is a 65 y.o. male on day 7 of admission presenting with Generalized weakness.      Subjective   Patient resting quietly in bed.  No over night events reported.  Awaiting SNF placement.       Objective     Last Recorded Vitals  /88 (BP Location: Left arm, Patient Position: Lying)   Pulse 96   Temp 36.4 °C (97.6 °F) (Temporal)   Resp 18   Wt 82.7 kg (182 lb 5.1 oz)   SpO2 96%   Intake/Output last 3 Shifts:    Intake/Output Summary (Last 24 hours) at 1/5/2025 0942  Last data filed at 1/5/2025 0940  Gross per 24 hour   Intake 220 ml   Output 1560 ml   Net -1340 ml       Admission Weight  Weight: 86.2 kg (190 lb) (12/29/24 1118)    Daily Weight  12/29/24 : 82.7 kg (182 lb 5.1 oz)    Image Results  CT pelvis wo IV contrast  Narrative: Interpreted By:  Wilberto Gong,   STUDY:  CT PELVIS WO IV CONTRAST;  12/29/2024 12:11 pm      INDICATION:  Signs/Symptoms:fall.          COMPARISON:  CT chest, abdomen and pelvis with contrast 27 April 2024      ACCESSION NUMBER(S):  [id number]      ORDERING CLINICIAN:  RACHAEL VILLATORO      TECHNIQUE:  CT pelvis from the pelvic inlet through the symphysis pubis without  IV contrast. Coronal and sagittal reformatted images created,  reviewed and saved      FINDINGS:  No acute fracture or hip dislocation or any other acute traumatic  injury evident      Old, healed right inferior pubic ramus fracture      Severe degenerative changes of left hip with subchondral cystic  change on both sides of the joint similar to prior CT      Included lower left kidney redemonstrates portions of two cysts and a  nonobstructing stone. No acute unexpected soft tissue findings  including the field-of-view      Impression: No acute fracture, dislocation, other traumatic injury or any other  acute process otherwise      Old, healed right inferior pubic ramus fracture      MACRO:  None      Signed by: Wilberto Gong 12/29/2024 12:40 PM  Dictation workstation:   [id number]  CT head wo IV  contrast, CT cervical spine wo IV contrast  Narrative: Interpreted By:  Wilberto Gong,   STUDY:  CT HEAD WO IV CONTRAST; CT CERVICAL SPINE WO IV CONTRAST;  12/29/2024  12:11 pm      INDICATION:  Signs/Symptoms:fall.          COMPARISON:  CT brain and cervical spine both from 7 April 2024      ACCESSION NUMBER(S):  [id number]; [id number]      ORDERING CLINICIAN:  RACHAEL VILLATORO      TECHNIQUE:  CT of the brain from the skull vertex to the skull base, without  intravenous contrast      CT cervical spine from the craniocervical junction through the  cervicothoracic junction without IV contrast, including sagittal and  coronal reformatted images      FINDINGS:  CT BRAIN      TRAUMA-RELATED      Brain Injury (BIG) guidelines CT values:      Skull fracture: No  SDH (subdural hematoma): None detected  EDH (epidural hematoma): None detected  IPH (intraparenchymal hemorrhage): None detected  SAH (subarachnoid hemorrhage): None detected  IVH (intraventricular hemorrhage): None detected      Reference:  Aba DUTTON, Fawn RS, Nelda M, et al. The BIG (brain injury  guidelines) project: defining the management of traumatic brain  injury by acute care surgeons. J Trauma Acute Care Surg.  2014;76:124a958.      OTHER      ACUTE INTRACRANIAL MASS EFFECT:  Negative      CT EVIDENCE OF ACUTE / SUBACUTE TERRITORIAL ISCHEMIA:  Negative      VENTRICLES:  Unchanged prominence of bilateral lateral without third  or fourth ventricle enlargement or acute obstructive hydrocephalus      OTHER BRAIN FINDINGS:  No significant interval change to the CT  appearance of the brain including the degree of atrophy and deep  white matter changes from chronic microvascular disease and large  area of encephalomalacia in left parietotemporal distribution      INCLUDED PARANASAL SINUSES: All clear      INCLUDED MASTOID AIR CELLS: All clear      SKULL:  No lytic or blastic lesion. Right craniotomy      EXTRACRANIAL SOFT TISSUES:  Scalp and occular globes  grossly normal  by CT      -------      CT CERVICAL SPINE      COUNTING REFERENCE: Craniocervical junction      CRANIOCERVICAL JUNCTION:  Intact      CERVICAL ALIGNMENT:  Anatomic; no acute traumatic alignment  abnormality such as subluxation      ACUTE FRACTURE: Negative      AGGRESSIVE OSSEOUS LESION: Negative      BONY CANAL AND FORAMINA:  No significant change from 7 April 2024      PARASPINAL SOFT TISSUES:  No large acute hematoma or other acute  posttraumatic finding      OTHER INCLUDED STRUCTURES:  No acute or contributory soft tissue  abnormality in the other cervical and upper thoracic soft tissues      Impression: NO ACUTE INTRACRANIAL PROCESS. SKULL INTACT      NO ACUTE FRACTURE OR SUBLUXATION IN THE CERVICAL SPINE      THIS REPORT SERVES AS THE DIAGNOSTIC INTERPRETATION FOR TWO EXAMS  PERFORMED CONCURRENTLY: CT BRAIN WITHOUT IV CONTRAST AND CT CERVICAL  SPINE WITHOUT IV CONTRAST      MACRO:  None      Signed by: Wilberto Gong 12/29/2024 12:35 PM  Dictation workstation:   [id number]  XR shoulder left 2+ views  Narrative: Interpreted By:  Geronimo Krishnamurthy,   STUDY:  XR SHOULDER LEFT 2+ VIEWS;  12/29/2024 11:57 am      INDICATION:  Signs/Symptoms:fall.      COMPARISON:  Prior exam from 09/08/2022      ACCESSION NUMBER(S):  [id number]      ORDERING CLINICIAN:  RACHAEL VILLATORO      TECHNIQUE:  3 views  of the  left shoulder were obtained.      FINDINGS:  Mild-to-moderate AC joint hypertrophy with spur formation. Moderate  glenohumeral joint space loss with spur formation. There is a  calcified loose body overlying the scapular neck. No lytic or blastic  destructive bone lesion. No acute fracture or dislocation.  No opaque soft tissue foreign body.  No periosteal reaction or erosion.      Impression: DJD throughout the left shoulder region as described. No acute  fracture or dislocation based on this exam.      MACRO:  None      Signed by: Geronimo Krishnamurthy 12/29/2024 12:09 PM  Dictation workstation:    [id number]  XR knee left 1-2 views  Narrative: Interpreted By:  Geronimo Krishnamurthy,   STUDY:  XR KNEE LEFT 1-2 VIEWS;  12/29/2024 11:57 am      INDICATION:  Signs/Symptoms:fall.      COMPARISON:  Prior exam from 02/11/2019      ACCESSION NUMBER(S):  [id number]      ORDERING CLINICIAN:  RACHAEL VILLATORO      TECHNIQUE:  AP and lateral views  of the  left knee were obtained.      FINDINGS:  Previous left knee arthroplasty. Increase in soft tissue  calcification in the suprapatellar bursa. There is also fluid in the  bursa. Patellar component is radiolucent. Femoral and tibial  components are well seated and in good alignment. No lytic or blastic  destructive bone lesion. No acute fracture or dislocation.  No opaque soft tissue foreign body.  No periosteal reaction or erosion.      Impression: Intact left knee arthroplasty. No acute fracture or dislocation.      Suprapatellar effusion.      Increase in nonspecific calcification within the suprapatellar bursa.      MACRO:  None      Signed by: Geronimo Krishnamurthy 12/29/2024 12:08 PM  Dictation workstation:   [id number]      Physical Exam  General Appearance: AAO x 2, not in acute distress and no respiratory distress  Skin: skin color, texture, turgor normal; no suspicious rashes or lesions  Eyes : PERRL, EOM's intact, conjunctiva pink  ENT: no oral thrush, no pharyngeal erythema or exudates  Neck: no JVD, no lymphadenopathy  Respiratory: lungs CTA, no rhonchi, wheezing, or crackles  Heart: RRR without murmur, gallop, or rubs, no ectopy  Abdomen: Nondistended, positive bowel sounds, soft,  nontender  Extremities: no edema, ROM x 4 extremities  Peripheral pulses: normal and present x 4 extremities  Neuro: alert, coherent and conversant, no focal motor deficits; does have thickened speech.  Does have cognitive impairment due to his TBI.       64-year-old male admitted for falls and generalized weakness. He has a history of ataxia as well as traumatic brain injury age 16 from a  pole vaulting accident, status postcraniotomy for subdural hematoma 2023, behavioral issues, type 2 diabetes, history of kidney stones bilaterally, BPH, chronic pain, type 2 diabetes, hyperlipidemia and seizure disorder     Assessment/Plan        Falls frequently  - Continue PT and OT  - Social work consult to assist with discharge planning; looking at [facility] pending pre-CERT     Generalized weakness  - Continue PT and OT  History of traumatic brain injury/status postcraniotomy/seizure disorder.  - Continue Keppra, Seroquel, Cymbalta  - Because the patient's history of TBI he does have agitation and behaviors.   Patient on Keppra, Provigil, Seroquel, Cymbalta.  - Seizure precautions in place     Chronic pain syndrome  - Tylenol scheduled around-the-clock  - Continue home Norco.  Morphine for severe pain breakthrough pain.     Depression  - Continue Cymbalta and Seroquel    BPH  - tamsulosin 0.4 mg p.o. daily     Essential hypertension  - Losartan 25 mg p.o. daily  - Metoprolol 25 mg daily, patient has been tachycardic up to 125.  His metoprolol was increased to 25 mg twice daily continue to monitor heart rate and blood pressures        Hypertryglycerdiemia  - fenofibrate 160mg po daily      Bowel regimen: miralax   Dvt prophylaxis: lovenox subcu   Dispo: Medically ready for discharge, awaiting pre-CERT for SNF placement - [facility] pending pre-CERT.             Yaquelin Laird, MARVIN-CNP

## 2025-01-05 NOTE — CARE PLAN
The patient's goals for the shift include gain strength ambulate better    The clinical goals for the shift include monitor labs/v/s no falls, labs and vs wdl

## 2025-01-06 LAB
GLUCOSE BLD MANUAL STRIP-MCNC: 157 MG/DL (ref 74–99)
GLUCOSE BLD MANUAL STRIP-MCNC: 157 MG/DL (ref 74–99)
GLUCOSE BLD MANUAL STRIP-MCNC: 198 MG/DL (ref 74–99)
GLUCOSE BLD MANUAL STRIP-MCNC: 219 MG/DL (ref 74–99)

## 2025-01-06 PROCEDURE — 97116 GAIT TRAINING THERAPY: CPT | Mod: GP

## 2025-01-06 PROCEDURE — 2500000001 HC RX 250 WO HCPCS SELF ADMINISTERED DRUGS (ALT 637 FOR MEDICARE OP): Performed by: NURSE PRACTITIONER

## 2025-01-06 PROCEDURE — 2500000002 HC RX 250 W HCPCS SELF ADMINISTERED DRUGS (ALT 637 FOR MEDICARE OP, ALT 636 FOR OP/ED): Performed by: NURSE PRACTITIONER

## 2025-01-06 PROCEDURE — 99232 SBSQ HOSP IP/OBS MODERATE 35: CPT | Performed by: NURSE PRACTITIONER

## 2025-01-06 PROCEDURE — 97530 THERAPEUTIC ACTIVITIES: CPT | Mod: GO

## 2025-01-06 PROCEDURE — 82947 ASSAY GLUCOSE BLOOD QUANT: CPT

## 2025-01-06 PROCEDURE — 2500000005 HC RX 250 GENERAL PHARMACY W/O HCPCS: Performed by: NURSE PRACTITIONER

## 2025-01-06 PROCEDURE — 2500000001 HC RX 250 WO HCPCS SELF ADMINISTERED DRUGS (ALT 637 FOR MEDICARE OP)

## 2025-01-06 PROCEDURE — 1100000001 HC PRIVATE ROOM DAILY

## 2025-01-06 PROCEDURE — 2500000002 HC RX 250 W HCPCS SELF ADMINISTERED DRUGS (ALT 637 FOR MEDICARE OP, ALT 636 FOR OP/ED)

## 2025-01-06 PROCEDURE — 97530 THERAPEUTIC ACTIVITIES: CPT | Mod: GP

## 2025-01-06 PROCEDURE — 2500000004 HC RX 250 GENERAL PHARMACY W/ HCPCS (ALT 636 FOR OP/ED)

## 2025-01-06 RX ADMIN — ENOXAPARIN SODIUM 40 MG: 40 INJECTION SUBCUTANEOUS at 17:34

## 2025-01-06 RX ADMIN — FLUTICASONE PROPIONATE 1 SPRAY: 50 SPRAY, METERED NASAL at 10:35

## 2025-01-06 RX ADMIN — QUETIAPINE FUMARATE 25 MG: 25 TABLET ORAL at 10:20

## 2025-01-06 RX ADMIN — FENOFIBRATE 160 MG: 160 TABLET ORAL at 10:23

## 2025-01-06 RX ADMIN — DULOXETINE HYDROCHLORIDE 60 MG: 60 CAPSULE, DELAYED RELEASE ORAL at 10:23

## 2025-01-06 RX ADMIN — QUETIAPINE FUMARATE 25 MG: 25 TABLET ORAL at 15:39

## 2025-01-06 RX ADMIN — IBUPROFEN 600 MG: 600 TABLET, FILM COATED ORAL at 11:52

## 2025-01-06 RX ADMIN — IBUPROFEN 600 MG: 600 TABLET, FILM COATED ORAL at 00:21

## 2025-01-06 RX ADMIN — LEVETIRACETAM 500 MG: 500 TABLET, FILM COATED ORAL at 20:40

## 2025-01-06 RX ADMIN — ONDANSETRON 4 MG: 4 TABLET, ORALLY DISINTEGRATING ORAL at 10:20

## 2025-01-06 RX ADMIN — ACETAMINOPHEN 975 MG: 325 TABLET ORAL at 20:40

## 2025-01-06 RX ADMIN — IBUPROFEN 600 MG: 600 TABLET, FILM COATED ORAL at 17:34

## 2025-01-06 RX ADMIN — QUETIAPINE FUMARATE 25 MG: 25 TABLET ORAL at 20:40

## 2025-01-06 RX ADMIN — FLUOROMETHOLONE 1 DROP: 1 SOLUTION/ DROPS OPHTHALMIC at 15:39

## 2025-01-06 RX ADMIN — POTASSIUM CHLORIDE 40 MEQ: 1500 TABLET, EXTENDED RELEASE ORAL at 10:19

## 2025-01-06 RX ADMIN — TAMSULOSIN HYDROCHLORIDE 0.4 MG: 0.4 CAPSULE ORAL at 10:19

## 2025-01-06 RX ADMIN — HYDROCODONE BITARTRATE AND ACETAMINOPHEN 2 TABLET: 5; 325 TABLET ORAL at 10:19

## 2025-01-06 RX ADMIN — DULOXETINE HYDROCHLORIDE 30 MG: 60 CAPSULE, DELAYED RELEASE ORAL at 10:20

## 2025-01-06 RX ADMIN — POLYETHYLENE GLYCOL 3350 17 G: 17 POWDER, FOR SOLUTION ORAL at 10:19

## 2025-01-06 RX ADMIN — Medication 2000 UNITS: at 10:22

## 2025-01-06 RX ADMIN — LIDOCAINE 2 PATCH: 4 PATCH TOPICAL at 10:19

## 2025-01-06 RX ADMIN — METOPROLOL SUCCINATE 25 MG: 25 TABLET, EXTENDED RELEASE ORAL at 10:47

## 2025-01-06 RX ADMIN — IBUPROFEN 600 MG: 600 TABLET, FILM COATED ORAL at 06:38

## 2025-01-06 RX ADMIN — LOSARTAN POTASSIUM 25 MG: 25 TABLET, FILM COATED ORAL at 10:35

## 2025-01-06 RX ADMIN — FLUOROMETHOLONE 1 DROP: 1 SOLUTION/ DROPS OPHTHALMIC at 20:40

## 2025-01-06 RX ADMIN — MODAFINIL 200 MG: 100 TABLET ORAL at 10:43

## 2025-01-06 RX ADMIN — FLUOROMETHOLONE 1 DROP: 1 SOLUTION/ DROPS OPHTHALMIC at 10:35

## 2025-01-06 RX ADMIN — LEVETIRACETAM 500 MG: 500 TABLET, FILM COATED ORAL at 10:20

## 2025-01-06 RX ADMIN — HYDROCODONE BITARTRATE AND ACETAMINOPHEN 2 TABLET: 5; 325 TABLET ORAL at 23:07

## 2025-01-06 RX ADMIN — OXYCODONE HYDROCHLORIDE AND ACETAMINOPHEN 1000 MG: 500 TABLET ORAL at 10:20

## 2025-01-06 RX ADMIN — ACETAMINOPHEN 975 MG: 325 TABLET ORAL at 10:19

## 2025-01-06 ASSESSMENT — COGNITIVE AND FUNCTIONAL STATUS - GENERAL
STANDING UP FROM CHAIR USING ARMS: A LOT
HELP NEEDED FOR BATHING: A LOT
MOVING TO AND FROM BED TO CHAIR: A LOT
CLIMB 3 TO 5 STEPS WITH RAILING: TOTAL
DAILY ACTIVITIY SCORE: 14
PERSONAL GROOMING: A LITTLE
TURNING FROM BACK TO SIDE WHILE IN FLAT BAD: A LITTLE
EATING MEALS: A LITTLE
MOVING FROM LYING ON BACK TO SITTING ON SIDE OF FLAT BED WITH BEDRAILS: A LITTLE
TOILETING: A LOT
WALKING IN HOSPITAL ROOM: A LOT
MOBILITY SCORE: 13
DRESSING REGULAR LOWER BODY CLOTHING: A LOT
DRESSING REGULAR UPPER BODY CLOTHING: A LOT

## 2025-01-06 ASSESSMENT — PAIN SCALES - GENERAL
PAINLEVEL_OUTOF10: 0 - NO PAIN
PAINLEVEL_OUTOF10: 8
PAINLEVEL_OUTOF10: 0 - NO PAIN

## 2025-01-06 ASSESSMENT — PAIN DESCRIPTION - LOCATION: LOCATION: GENERALIZED

## 2025-01-06 ASSESSMENT — PAIN - FUNCTIONAL ASSESSMENT
PAIN_FUNCTIONAL_ASSESSMENT: 0-10

## 2025-01-06 NOTE — ASSESSMENT & PLAN NOTE
- Continue PT and OT  - Social work consult to assist with discharge planning; looking at [facility] pending pre-CERT

## 2025-01-06 NOTE — PROGRESS NOTES
Occupational Therapy    OT Treatment    Patient Name: Kenneth Valenzuela  MRN: [id number]  Today's Date: 1/6/2025  Time Calculation  Start Time: 1131  Stop Time: 1200  Time Calculation (min): 29 min       303/303-A    Assessment:  OT Assessment: pt making gains in activity tolerance and balance during standing tasks which will in turn increase indep with ADLs. pt requires max A for LB dress with LOB in sitting.  Cont with current OT POC  Prognosis: Good  Barriers to Discharge Home: Caregiver assistance, Cognition needs, Physical needs  Caregiver Assistance: Caregiver assistance needed per identified barriers - however, level of patient's required assistance exceeds assistance available at home  Cognition Needs: 24hr supervision for safety awareness needed, Recollection or understanding of precautions/restrictions limited  Physical Needs: Stair navigation into home limited by function/safety, 24hr mobility assistance needed, 24hr ADL assistance needed, High falls risk due to function or environment  Evaluation/Treatment Tolerance: Patient tolerated treatment well, Patient limited by pain  Medical Staff Made Aware: Yes  End of Session Communication: Bedside nurse  End of Session Patient Position: Up in chair, Alarm on (call light in reach)  OT Assessment Results: Decreased ADL status, Decreased upper extremity strength, Decreased upper extremity range of motion, Decreased safe judgment during ADL, Decreased endurance, Decreased functional mobility  Prognosis: Good  Evaluation/Treatment Tolerance: Patient tolerated treatment well, Patient limited by pain  Medical Staff Made Aware: Yes    Plan:  Treatment Interventions: ADL retraining, UE strengthening/ROM, Endurance training, Neuromuscular reeducation, Patient/family training, Compensatory technique education  OT Frequency: 3 times per week  OT Discharge Recommendations: Moderate intensity level of continued care  OT Recommended Transfer Status: Assist of 2  OT - OK to  Discharge: Yes (once medically stable)  Treatment Interventions: ADL retraining, UE strengthening/ROM, Endurance training, Neuromuscular reeducation, Patient/family training, Compensatory technique education  Subjective     Current Problem:  Patient Active Problem List   Diagnosis    Allergic rhinitis    Ataxia    Benign prostatic hyperplasia with lower urinary tract symptoms    Bilateral renal stones    Cervical arthritis with myelopathy    Chronic pain syndrome    Controlled type 2 diabetes mellitus without complication, without long-term current use of insulin (Multi)    Depression, major, single episode, moderate (Multi)    Falls frequently    Hyperlipidemia    Hypertension    Impingement syndrome of left shoulder    Male erectile disorder    Primary osteoarthritis of both hips    Primary osteoarthritis of left shoulder    Seizure (Multi)    Subdural hematoma (Multi)    Unspecified intracranial injury with loss of consciousness greater than 24 hours with return to pre-existing conscious level, initial encounter (Multi)    Balance disturbance due to old head injury    Behavior-irritability    Cognitive deficit as late effect of traumatic brain injury    Diabetic polyneuropathy associated with type 2 diabetes mellitus (Multi)    Difficulty controlling behavior as late effect traumatic brain injury    Hip pain, left    Hypercalcemia    Migraine without aura and without status migrainosus, not intractable    Median nerve entrapment    Class 1 obesity due to excess calories with serious comorbidity and body mass index (BMI) of 31.0 to 31.9 in adult    Tachycardia    Thoracic back pain    Generalized weakness    Traumatic retroperitoneal hematoma    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    Acquired bilateral renal cysts    Constipation    Frequency of urination    Neurogenic dysfunction of the urinary bladder    Urinary urgency    Combined forms of age-related cataract of right eye        General:  OT Received On: 01/06/25  Reason for Referral: frequent falls  Referred By: [facility] PT/OT eval and treat  Past Medical History Relevant to Rehab: HLD, HTN, depression, DM, TBI (basilar skull fx 1970's in pole vaulting accident), ataxia, subdural hematoma w R craniotomy 2023, seizures, behavioral issues  Family/Caregiver Present: No  Co-Treatment: PT  Co-Treatment Reason: Maximize pt safety with mobility while addressing discipline-specific goals  Prior to Session Communication: Bedside nurse  Patient Position Received: Bed, 3 rail up, Alarm on  General Comment: pt agreeable to session and anxious to participate    Vital Signs:  Vital Signs  Vital Signs Comment: no concerns    Pain:  Pain Assessment  Pain Assessment: 0-10  0-10 (Numeric) Pain Score: 0 - No pain (pt has generalized pain in shoulders/back/hips but does not rate pain)  Pain Type: Acute pain, Chronic pain  Objective      Activities of Daily Living:       LE Dressing  LE Dressing: Yes  Pants Level of Assistance: Dependent, Maximum assistance  LE Dressing Comments: pt attempts to thread pants while sitting EOB which significantly affects sitting balance.   pt unable to successfully complete and OT threads pants.  pt able to partially assist in pulling up pants when standing         Bed Mobility/Transfers: Bed Mobility  Bed Mobility: Yes  Bed Mobility 1  Bed Mobility 1: Sitting to supine  Level of Assistance 1: Close supervision  Bed Mobility Comments 1: extra time.  HOB elevated and heavy use of bed rail  Transfers  Transfer: Yes  Transfer 1  Technique 1: Sit to stand, Stand to sit  Transfer Device 1: Walker, Gait belt  Transfer Level of Assistance 1: Moderate assistance  Trials/Comments 1: pt completes multiple sit to stands (20 or more) with cues needed for safety and to slow down.   pt level of assist varries throughout needing anywhere from CGA to mod A at times  Transfers 2  Transfer From 2: Bed to  Transfer to 2: Chair with  arms  Transfer Device 2: Walker, Gait belt  Transfer Level of Assistance 2: Minimum assistance, +2  Trials/Comments 2: assist to manage FWW.  flex knee/rotated RLE          Strength:  Strength Comments: pt with c/o severe pain with any arm movement due to arthritis.  NT at this time    Outcome Measures:[facility] Daily Activity  Putting on and taking off regular lower body clothing: A lot  Bathing (including washing, rinsing, drying): A lot  Putting on and taking off regular upper body clothing: A lot  Toileting, which includes using toilet, bedpan or urinal: A lot  Taking care of personal grooming such as brushing teeth: A little  Eating Meals: A little  Daily Activity - Total Score: 14  Education Documentation  Precautions, taught by Jess Ge OT at 1/6/2025 12:19 PM.  Learner: Patient  Readiness: Acceptance  Method: Explanation, Demonstration  Response: Demonstrated Understanding, Needs Reinforcement  Comment: safety and technique for balance during functional tasks    ADL Training, taught by Jess Ge OT at 1/6/2025 12:19 PM.  Learner: Patient  Readiness: Acceptance  Method: Explanation, Demonstration  Response: Demonstrated Understanding, Needs Reinforcement  Comment: safety and technique for balance during functional tasks    Education Comments  No comments found.           EDUCATION:       Goals:  Encounter Problems       Encounter Problems (Active)       ADLs       Patient will perform UB bathing  with stand by assist level of assistance. (Progressing)       Start:  12/30/24    Expected End:  01/13/25            Patient with complete lower body dressing with minimal assist  level of assistance  (Progressing)       Start:  12/30/24    Expected End:  01/13/25            Patient will complete toileting including hygiene clothing management/hygiene with minimal assist  level of assistance. (Progressing)       Start:  12/30/24    Expected End:  01/13/25               TRANSFERS       Patient will complete  functional transfers with stand by assist level of assistance. (Progressing)       Start:  12/30/24    Expected End:  01/13/25

## 2025-01-06 NOTE — PROGRESS NOTES
Pt reviewed in care rounds this morning. Pt medically ready for discharge.  Pt is medically discharged. Plan is for Pt to go to [facility] pending insurance precert. SW to send updated PT/OT notes to facility when available. Facility submitted for precert and SW is following for approval.     1249 SW met w/ Pt at bedside.  Updated Pt re: precert and discharge to SNF status. MOI explained IMM and Pt signed, SW left copy w/ Pt @ bedside. SHELBY Estrada attached and sent PT/OT notes to [facility]. [facility] confirmed receipt and advised same will update MOI re: precert. SW to follow.      01/06/25 1202   Discharge Planning   Expected Discharge Disposition SNF  ([facility] - facility submitted for precert)   Stroke Family Assessment   Stroke Family Assessment Needed No   Intensity of Service   Intensity of Service 0-30 min

## 2025-01-06 NOTE — PROGRESS NOTES
Physical Therapy    Physical Therapy Treatment    Patient Name: Kenneth Valenzuela  MRN: [id number]  Today's Date: 1/6/2025  Time Calculation  Start Time: 1131  Stop Time: 1200  Time Calculation (min): 29 min     303/303-A    Assessment/Plan   PT Assessment  PT Assessment Results: Decreased strength, Decreased range of motion, Decreased endurance, Impaired balance, Decreased mobility, Impaired judgement, Decreased safety awareness, Pain  Rehab Prognosis: Fair  Barriers to Discharge Home: Cognition needs, Caregiver assistance  Caregiver Assistance: Caregiver assistance needed per identified barriers - however, level of patient's required assistance exceeds assistance available at home  Cognition Needs: 24hr supervision for safety awareness needed  Evaluation/Treatment Tolerance: Patient limited by pain, Patient limited by fatigue  End of Session Communication: Bedside nurse  Assessment Comment: Pt with good tolerance to treatment this date with need for mod A for mobility for safety and balance. He is pleasant and eager to participate in session.  End of Session Patient Position: Up in chair, Alarm on (call light in reach)  PT Plan  Inpatient/Swing Bed or Outpatient: Inpatient  PT Plan  Treatment/Interventions: Bed mobility, Transfer training, Stair training, Gait training, Balance training, Neuromuscular re-education, Strengthening, Endurance training, Range of motion, Therapeutic exercise, Therapeutic activity, Home exercise program  PT Plan: Ongoing PT  PT Frequency: 3 times per week  PT Discharge Recommendations: Moderate intensity level of continued care, 24 hr supervision due to cognition  PT Recommended Transfer Status: Assist x1  PT - OK to Discharge: Yes (PT eval complete, ok to d/c once deemed medically appropriate)    Current Problem:  Patient Active Problem List   Diagnosis    Allergic rhinitis    Ataxia    Benign prostatic hyperplasia with lower urinary tract symptoms    Bilateral renal stones    Cervical  arthritis with myelopathy    Chronic pain syndrome    Controlled type 2 diabetes mellitus without complication, without long-term current use of insulin (Multi)    Depression, major, single episode, moderate (Multi)    Falls frequently    Hyperlipidemia    Hypertension    Impingement syndrome of left shoulder    Male erectile disorder    Primary osteoarthritis of both hips    Primary osteoarthritis of left shoulder    Seizure (Multi)    Subdural hematoma (Multi)    Unspecified intracranial injury with loss of consciousness greater than 24 hours with return to pre-existing conscious level, initial encounter (Multi)    Balance disturbance due to old head injury    Behavior-irritability    Cognitive deficit as late effect of traumatic brain injury    Diabetic polyneuropathy associated with type 2 diabetes mellitus (Multi)    Difficulty controlling behavior as late effect traumatic brain injury    Hip pain, left    Hypercalcemia    Migraine without aura and without status migrainosus, not intractable    Median nerve entrapment    Class 1 obesity due to excess calories with serious comorbidity and body mass index (BMI) of 31.0 to 31.9 in adult    Tachycardia    Thoracic back pain    Generalized weakness    Traumatic retroperitoneal hematoma    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    Acquired bilateral renal cysts    Constipation    Frequency of urination    Neurogenic dysfunction of the urinary bladder    Urinary urgency    Combined forms of age-related cataract of right eye       General Visit Information:   PT  Visit  PT Received On: 01/06/25  General  Reason for Referral: impaired mobility  Referred By: Bina PT/OT eval and treat  Past Medical History Relevant to Rehab: HLD, HTN, depression, DM, TBI (basilar skull fx 1970's in pole vaulting accident), ataxia, subdural hematoma w R craniotomy 2023, seizures, behavioral issues  Family/Caregiver Present: No  Co-Treatment: OT  Co-Treatment Reason:  "Maximize pt safety with mobility while addressing discipline-specific goals  Prior to Session Communication: Bedside nurse  Patient Position Received: Bed, 3 rail up, Alarm on  General Comment: pt agreeable to session and anxious to participate  Subjective     Precautions:  Precautions  Medical Precautions: Fall precautions, Seizure precautions    Vital Signs:     Objective     Pain:  Pain Assessment  Pain Assessment: 0-10  0-10 (Numeric) Pain Score: 0 - No pain (pt has generalized pain in shoulders/back/hips but does not rate pain)  Pain Location:  (\"all over\")    Cognition:  Cognition  Overall Cognitive Status:  (no new changes)  Orientation Level: Oriented X4  Following Commands: Follows one step commands without difficulty  Impulsive: Mildly  Processing Speed: Delayed    Postural Control:       Extremity/Trunk Assessments:        RLE   RLE :  (R knee severe varus)  LLE   LLE :  (ROM WFL)    Activity Tolerance:  Activity Tolerance  Endurance: Tolerates 10 - 20 min exercise with multiple rests    Treatments:  Therapeutic Exercise  Therapeutic Exercise Performed: Yes  Therapeutic Exercise Activity 1: standing march x20     Balance/Neuromuscular Re-Education  Balance/Neuromuscular Re-Education Activity Performed: Yes  Balance/Neuromuscular Re-Education Activity 1: standing balance x20 seconds, no UE support  Bed Mobility  Bed Mobility: Yes  Bed Mobility 1  Bed Mobility 1: Supine to sitting  Level of Assistance 1: Close supervision  Bed Mobility Comments 1: increased time. HOB ~45 degrees. uses bed rails and momentum  Ambulation/Gait Training  Ambulation/Gait Training Performed: Yes  Ambulation/Gait Training 1  Surface 1: Level tile  Device 1: Rolling walker  Gait Support Devices: Gait belt  Assistance 1: Moderate assistance (x2 assist)  Comments/Distance (ft) 1: ambulates ~50' with mod Ax2, FWW, and chair follow. Unsteady requiring frequent assist for balance. Cues to keep FWW close.  Transfers  Transfer: " Yes  Transfer 1  Technique 1: Sit to stand, Stand to sit  Transfer Device 1: Walker, Gait belt  Transfer Level of Assistance 1: Moderate assistance  Trials/Comments 1: pt completes multiple sit to stands (20 or more) with cues needed for safety and to slow down. pt level of assist varries throughout needing anywhere from CGA to mod A at times          Outcome Measures:     [facility] Basic Mobility  Turning from your back to your side while in a flat bed without using bedrails: A little  Moving from lying on your back to sitting on the side of a flat bed without using bedrails: A little  Moving to and from bed to chair (including a wheelchair): A lot  Standing up from a chair using your arms (e.g. wheelchair or bedside chair): A lot  To walk in hospital room: A lot  Climbing 3-5 steps with railing: Total  Basic Mobility - Total Score: 13                                      Education Documentation  Mobility Training, taught by Sarah Jensen PT at 1/6/2025 12:51 PM.  Learner: Patient  Readiness: Acceptance  Method: Explanation  Response: Needs Reinforcement  Comment: Safety, ww management    Education Comments  No comments found.           EDUCATION:     Encounter Problems       Encounter Problems (Active)       PT Problem       Pt will demo stand pivot transfer and sit > stand > sit transfer with FWW and CGA  (Progressing)       Start:  12/30/24    Expected End:  01/13/25            Pt will ambulate 3' with FWW and CGA, without LOB  (Progressing)       Start:  12/30/24    Expected End:  01/13/25            Pt will sit EOB x5 minutes with supervision while participating in therex program to demonstrate improved trunk control (Progressing)       Start:  12/30/24    Expected End:  01/13/25            Pt will demonstrate ability to tolerate 8 minutes of seated or standing therapeutic exercise with 4 or less rest breaks to demonstrate improved activity tolerance.  (Progressing)       Start:  12/30/24    Expected End:   01/13/25               Pain - Adult

## 2025-01-06 NOTE — ASSESSMENT & PLAN NOTE
- Continue PT and OT  History of traumatic brain injury/status postcraniotomy/seizure disorder.  - Continue Keppra, Seroquel, Cymbalta  - Because the patient's history of TBI he does have agitation and behaviors.   Patient on Keppra, Provigil, Seroquel, Cymbalta.  - Seizure precautions in place    Chronic pain syndrome  - Tylenol scheduled around-the-clock  - Continue home Norco.  Morphine for severe pain breakthrough pain.    Depression  - Continue Cymbalta and Seroquel    BPH  - tamsulosin 0.4 mg p.o. daily     Essential hypertension  - Losartan 25 mg p.o. daily  - Metoprolol 25 mg daily, patient has been tachycardic up to 125.  I will increase the dose to 25 mg twice daily and monitor heart rate accordingly blood pressures ranged from 104/64 to 167/93, elevated blood pressures occurred to be in the morning before medications are given.       Hypertryglycerdiemia  - fenofibrate 160mg po daily      Bowel regimen: miralax   Dvt prophylaxis: lovenox subcu   Dispo: Medically ready for discharge,  - [facility] pending pre-[facility]ERT.  Patient should be able to go there today.

## 2025-01-06 NOTE — PROGRESS NOTES
"Nutrition Initial Assessment:   Nutrition Assessment         Patient is a 65 y.o. male on day 8presenting with Generalized weakness.    1/6:  pt reports good po intake. Pt in good spirits today. Discussed at length about DM and diet modifications. Pt reports good eating habits at home. Pt did admit to restricting carbohydrates but after our discussion had a better understanding of the needed diet modifications to control blood sugar.     Nutrition History:     Food Allergies/Intolerances:  None  GI Symptoms: None  Oral Problems: Swallowing difficulty at times wife reports patient has had issues since having a trach placed which caused some scaring. Pt has issues with \"crumby\" foods sucks as cookies and breads. Pt does cough at times when drinking water.   Teeth: Missing teeth.     Wound Type: intact (nursing/wound notes provide further details)    Anthropometrics:  Height: 167.6 cm (5' 5.98\")   Weight: 82.7 kg (182 lb 5.1 oz)   BMI (Calculated): 29.44             Weight History:   Wt Readings from Last 10 Encounters:   12/29/24 82.7 kg (182 lb 5.1 oz)   12/06/24 88 kg (194 lb)   09/30/24 87.1 kg (192 lb)   09/17/24 87.1 kg (192 lb)   07/15/24 86.2 kg (190 lb)   05/14/24 86.2 kg (190 lb)   05/09/24 85.7 kg (189 lb)   04/07/24 85.7 kg (189 lb)   03/29/24 92.1 kg (203 lb)   02/06/24 85.7 kg (189 lb)         Weight Change %:  Weight History / % Weight Change: per emr, pt with 21lb weight loss over the last 8 months. pt has decreased po intake over the last month which has caused some weight loss as well. overall, 10% weight loss in 8 months.  Significant Weight Loss: No       Nutrition Focused Physical Exam Findings:  Subcutaneous Fat Loss:   Orbital Fat Pads: Well nourished (slightly bulging fat pads)  Buccal Fat Pads: Well nourished (full, rounded cheeks)  Triceps: Well nourished (ample fat tissue)  Muscle Wasting:  Temporalis: Well nourished (well-defined muscle)  Pectoralis (Clavicular Region): Well nourished " (clavicle not visible)  Deltoid/Trapezius: Well nourished (rounded appearance at arm, shoulder, neck)  Interosseous: Well nourished (muscle bulges)  Edema:  Edema: none  Physical Findings:       Nutrition Significant Labs:  CBC Trend:   Results from last 7 days   Lab Units 01/01/25  0425   WBC AUTO x10*3/uL 4.3*   RBC AUTO x10*6/uL 4.82   HEMOGLOBIN g/dL 14.4   HEMATOCRIT % 45.3   MCV fL 94   PLATELETS AUTO x10*3/uL 226    , BMP Trend:   Results from last 7 days   Lab Units 01/05/25  1019 01/01/25  0425 12/31/24  0412   GLUCOSE mg/dL  --  207* 192*   CALCIUM mg/dL  --  10.0 10.0   SODIUM mmol/L  --  137 139   POTASSIUM mmol/L 4.1 3.9 3.3*   CO2 mmol/L  --  21 23   CHLORIDE mmol/L  --  106 104   BUN mg/dL  --  22 17   CREATININE mg/dL  --  0.86 0.75    , A1C:  Lab Results   Component Value Date    HGBA1C 8.5 (H) 09/17/2024       Nutrition Specific Medications:  acetaminophen, 975 mg, oral, q12h  ascorbic acid, 1,000 mg, oral, Daily  cholecalciferol, 2,000 Units, oral, Daily  DULoxetine, 30 mg, oral, Daily  DULoxetine, 60 mg, oral, Daily  enoxaparin, 40 mg, subcutaneous, q24h  fenofibrate, 160 mg, oral, Daily  fluorometholone, 1 drop, Right Eye, TID  fluticasone, 1 spray, Each Nostril, Daily  ibuprofen, 600 mg, oral, q6h ANGELICA  levETIRAcetam, 500 mg, oral, BID  lidocaine, 2 patch, transdermal, Daily  losartan, 25 mg, oral, Daily  metoprolol succinate XL, 25 mg, oral, Daily  modafinil, 200 mg, oral, Daily  polyethylene glycol, 17 g, oral, Daily  potassium chloride CR, 40 mEq, oral, Daily  QUEtiapine, 25 mg, oral, TID  tamsulosin, 0.4 mg, oral, Daily         I/O:   Last BM Date: 01/06/25; Stool Appearance: Formed (01/03/25 7095)    Dietary Orders (From admission, onward)       Start     Ordered    12/29/24 1844  May Participate in Room Service  ( ROOM SERVICE MAY PARTICIPATE)  Once        Question:  .  Answer:  Yes    12/29/24 1843 12/29/24 1739  Adult diet Regular, Consistent Carb; CCD 75 gm/meal  Diet effective now         Question Answer Comment   Diet type Regular    Diet type Consistent Carb    Carb diet selection: CCD 75 gm/meal        12/29/24 3074                     Estimated Needs:   Total Energy Estimated Needs (kCal): 1560 kCal  Method for Estimating Needs: MSJ  Total Protein Estimated Needs (g): 83 g  Method for Estimating Needs: 1g/kg  Total Fluid Estimated Needs (mL): 1560 mL  Method for Estimating Needs: 1ml/kcal        Nutrition Diagnosis        Nutrition Diagnosis  Patient has Nutrition Diagnosis: Yes  Nutrition Diagnosis 1: Food and nutrition related knowledge deficit  Related to (1): Diabetes  As Evidenced by (1): food restriction       Nutrition Interventions/Recommendations         Nutrition Prescription:  Individualized Nutrition Prescription Provided for : meals and snacks to provide pt with adequate calories and protein        Nutrition Interventions:   Interventions: Meals and snacks  Meals and Snacks: General healthful diet, Carbohydrate-modified diet    Collaboration and Referral of Nutrition Care: Collaboration by nutrition professional with other providers  Goal: Attend Care rounds    Nutrition Education:  discussed carbohydrate intake with wife at bedside and emphasized eating carbohydrates and protein at every meal. Limiting simple sugars .  1/6: discussed DM education with patient this date.    Education Documentation  No documentation found.        Nutrition Monitoring and Evaluation   Food/Nutrient Related History Monitoring  Monitoring and Evaluation Plan: Energy intake  Criteria: pt to tolerate >50% of meals and supplements              Nutrition Focused Physical Findings  Other: Evaluated nutrition intervention as compared to nutrition goal(s) and estimated nutrient need criteria.       Follow Up:     Time Spent (min): 60 minutes  Last Date of Nutrition Visit: 01/06/25  Nutrition Follow-Up Needed?: Dietitian to reassess per policy  Follow up Comment: 1/13-1/15  Time Spent (min): 60  minutes      01/06/25 at 2:32 PM - AMARILYS MENCHACA RDN, LD

## 2025-01-06 NOTE — PROGRESS NOTES
Kenneth Valenzuela is a 65 y.o. male on day 8 of admission presenting with Generalized weakness.      Subjective   No overnight events reported.  Patient awaiting SNF placement.  Voices no concerns today.       Objective     Last Recorded Vitals  BP (!) 168/102 (BP Location: Right arm, Patient Position: Lying) Comment: RN notified  Pulse 98   Temp 36.2 °C (97.2 °F) (Temporal)   Resp 16   Wt 82.7 kg (182 lb 5.1 oz)   SpO2 95%   Intake/Output last 3 Shifts:    Intake/Output Summary (Last 24 hours) at 1/6/2025 0930  Last data filed at 1/6/2025 0900  Gross per 24 hour   Intake 840 ml   Output 950 ml   Net -110 ml       Admission Weight  Weight: 86.2 kg (190 lb) (12/29/24 1118)    Daily Weight  12/29/24 : 82.7 kg (182 lb 5.1 oz)    Image Results  CT pelvis wo IV contrast  Narrative: Interpreted By:  Wilberto Gong,   STUDY:  CT PELVIS WO IV CONTRAST;  12/29/2024 12:11 pm      INDICATION:  Signs/Symptoms:fall.          COMPARISON:  CT chest, abdomen and pelvis with contrast 27 April 2024      ACCESSION NUMBER(S):  [id number]      ORDERING CLINICIAN:  RACHAEL VILLATORO      TECHNIQUE:  CT pelvis from the pelvic inlet through the symphysis pubis without  IV contrast. Coronal and sagittal reformatted images created,  reviewed and saved      FINDINGS:  No acute fracture or hip dislocation or any other acute traumatic  injury evident      Old, healed right inferior pubic ramus fracture      Severe degenerative changes of left hip with subchondral cystic  change on both sides of the joint similar to prior CT      Included lower left kidney redemonstrates portions of two cysts and a  nonobstructing stone. No acute unexpected soft tissue findings  including the field-of-view      Impression: No acute fracture, dislocation, other traumatic injury or any other  acute process otherwise      Old, healed right inferior pubic ramus fracture      MACRO:  None      Signed by: Wilberto Gong 12/29/2024 12:40 PM  Dictation workstation:    [id number]  CT head wo IV contrast, CT cervical spine wo IV contrast  Narrative: Interpreted By:  Wilberto Gong,   STUDY:  CT HEAD WO IV CONTRAST; CT CERVICAL SPINE WO IV CONTRAST;  12/29/2024  12:11 pm      INDICATION:  Signs/Symptoms:fall.          COMPARISON:  CT brain and cervical spine both from 7 April 2024      ACCESSION NUMBER(S):  [id number]; [id number]      ORDERING CLINICIAN:  RACHAEL VILLATORO      TECHNIQUE:  CT of the brain from the skull vertex to the skull base, without  intravenous contrast      CT cervical spine from the craniocervical junction through the  cervicothoracic junction without IV contrast, including sagittal and  coronal reformatted images      FINDINGS:  CT BRAIN      TRAUMA-RELATED      Brain Injury (BIG) guidelines CT values:      Skull fracture: No  SDH (subdural hematoma): None detected  EDH (epidural hematoma): None detected  IPH (intraparenchymal hemorrhage): None detected  SAH (subarachnoid hemorrhage): None detected  IVH (intraventricular hemorrhage): None detected      Reference:  Aba DUTTON, Fawn RS, Nelda M, et al. The BIG (brain injury  guidelines) project: defining the management of traumatic brain  injury by acute care surgeons. J Trauma Acute Care Surg.  2014;76:121p932.      OTHER      ACUTE INTRACRANIAL MASS EFFECT:  Negative      CT EVIDENCE OF ACUTE / SUBACUTE TERRITORIAL ISCHEMIA:  Negative      VENTRICLES:  Unchanged prominence of bilateral lateral without third  or fourth ventricle enlargement or acute obstructive hydrocephalus      OTHER BRAIN FINDINGS:  No significant interval change to the CT  appearance of the brain including the degree of atrophy and deep  white matter changes from chronic microvascular disease and large  area of encephalomalacia in left parietotemporal distribution      INCLUDED PARANASAL SINUSES: All clear      INCLUDED MASTOID AIR CELLS: All clear      SKULL:  No lytic or blastic lesion. Right craniotomy      EXTRACRANIAL SOFT TISSUES:   Scalp and occular globes grossly normal  by CT      -------      CT CERVICAL SPINE      COUNTING REFERENCE: Craniocervical junction      CRANIOCERVICAL JUNCTION:  Intact      CERVICAL ALIGNMENT:  Anatomic; no acute traumatic alignment  abnormality such as subluxation      ACUTE FRACTURE: Negative      AGGRESSIVE OSSEOUS LESION: Negative      BONY CANAL AND FORAMINA:  No significant change from 7 April 2024      PARASPINAL SOFT TISSUES:  No large acute hematoma or other acute  posttraumatic finding      OTHER INCLUDED STRUCTURES:  No acute or contributory soft tissue  abnormality in the other cervical and upper thoracic soft tissues      Impression: NO ACUTE INTRACRANIAL PROCESS. SKULL INTACT      NO ACUTE FRACTURE OR SUBLUXATION IN THE CERVICAL SPINE      THIS REPORT SERVES AS THE DIAGNOSTIC INTERPRETATION FOR TWO EXAMS  PERFORMED CONCURRENTLY: CT BRAIN WITHOUT IV CONTRAST AND CT CERVICAL  SPINE WITHOUT IV CONTRAST      MACRO:  None      Signed by: Wilberto Gong 12/29/2024 12:35 PM  Dictation workstation:   [id number]  XR shoulder left 2+ views  Narrative: Interpreted By:  Geronimo Krishnamurthy,   STUDY:  XR SHOULDER LEFT 2+ VIEWS;  12/29/2024 11:57 am      INDICATION:  Signs/Symptoms:fall.      COMPARISON:  Prior exam from 09/08/2022      ACCESSION NUMBER(S):  [id number]      ORDERING CLINICIAN:  RACHAEL VILLATORO      TECHNIQUE:  3 views  of the  left shoulder were obtained.      FINDINGS:  Mild-to-moderate AC joint hypertrophy with spur formation. Moderate  glenohumeral joint space loss with spur formation. There is a  calcified loose body overlying the scapular neck. No lytic or blastic  destructive bone lesion. No acute fracture or dislocation.  No opaque soft tissue foreign body.  No periosteal reaction or erosion.      Impression: DJD throughout the left shoulder region as described. No acute  fracture or dislocation based on this exam.      MACRO:  None      Signed by: Geronimo Krishnamurthy 12/29/2024 12:09 PM  Dictation  workstation:   [id number]  XR knee left 1-2 views  Narrative: Interpreted By:  Geronimo Krishnamurthy,   STUDY:  XR KNEE LEFT 1-2 VIEWS;  12/29/2024 11:57 am      INDICATION:  Signs/Symptoms:fall.      COMPARISON:  Prior exam from 02/11/2019      ACCESSION NUMBER(S):  [id number]      ORDERING CLINICIAN:  RACHAEL VILLATORO      TECHNIQUE:  AP and lateral views  of the  left knee were obtained.      FINDINGS:  Previous left knee arthroplasty. Increase in soft tissue  calcification in the suprapatellar bursa. There is also fluid in the  bursa. Patellar component is radiolucent. Femoral and tibial  components are well seated and in good alignment. No lytic or blastic  destructive bone lesion. No acute fracture or dislocation.  No opaque soft tissue foreign body.  No periosteal reaction or erosion.      Impression: Intact left knee arthroplasty. No acute fracture or dislocation.      Suprapatellar effusion.      Increase in nonspecific calcification within the suprapatellar bursa.      MACRO:  None      Signed by: Geronimo Krishnamurthy 12/29/2024 12:08 PM  Dictation workstation:   [id number]      Physical Exam  General Appearance: AAO x 2, not in acute distress and no respiratory distress  Skin: skin color, texture, turgor normal; no suspicious rashes or lesions  Eyes : PERRL, EOM's intact, conjunctiva pink  ENT: no oral thrush, no pharyngeal erythema or exudates  Neck: no JVD, no lymphadenopathy  Respiratory: lungs CTA, no rhonchi, wheezing, or crackles  Heart: RRR without murmur, gallop, or rubs, no ectopy  Abdomen: Nondistended, positive bowel sounds, soft,  nontender  Extremities: no edema, ROM x 4 extremities  Peripheral pulses: normal and present x 4 extremities  Neuro: alert, coherent and conversant, no focal motor deficits; does have thickened speech.  Does have cognitive impairment due to his TBI.  Relevant Results     Results for orders placed or performed during the hospital encounter of 12/29/24 (from the past 24 hours)    Potassium   Result Value Ref Range    Potassium 4.1 3.5 - 5.3 mmol/L   Lavender Top   Result Value Ref Range    Extra Tube Hold for add-ons.    POCT GLUCOSE   Result Value Ref Range    POCT Glucose 160 (H) 74 - 99 mg/dL   POCT GLUCOSE   Result Value Ref Range    POCT Glucose 185 (H) 74 - 99 mg/dL   POCT GLUCOSE   Result Value Ref Range    POCT Glucose 259 (H) 74 - 99 mg/dL   POCT GLUCOSE   Result Value Ref Range    POCT Glucose 157 (H) 74 - 99 mg/dL     *Note: Due to a large number of results and/or encounters for the requested time period, some results have not been displayed. A complete set of results can be found in Results Review.         Assessment/Plan    64-year-old male admitted for falls and generalized weakness. He has a history of ataxia as well as traumatic brain injury age 16 from a pole vaulting accident, status postcraniotomy for subdural hematoma 2023, behavioral issues, type 2 diabetes, history of kidney stones bilaterally, BPH, chronic pain, type 2 diabetes, hyperlipidemia and seizure disorder.  Assessment & Plan  Falls frequently  - Continue PT and OT  - Social work consult to assist with discharge planning; looking at [facility] pending pre-CERT    Generalized weakness  - Continue PT and OT  History of traumatic brain injury/status postcraniotomy/seizure disorder.  - Continue Keppra, Seroquel, Cymbalta  - Because the patient's history of TBI he does have agitation and behaviors.   Patient on Keppra, Provigil, Seroquel, Cymbalta.  - Seizure precautions in place    Chronic pain syndrome  - Tylenol scheduled around-the-clock  - Continue home Norco.  Morphine for severe pain breakthrough pain.    Depression  - Continue Cymbalta and Seroquel    BPH  - tamsulosin 0.4 mg p.o. daily     Essential hypertension  - Losartan 25 mg p.o. daily  - Metoprolol 25 mg daily, patient has been tachycardic up to 125.  I will increase the dose to 25 mg twice daily and monitor heart rate accordingly blood  pressures ranged from 104/64 to 167/93, elevated blood pressures occurred to be in the morning before medications are given.       Hypertryglycerdiemia  - fenofibrate 160mg po daily      Bowel regimen: miralax   Dvt prophylaxis: lovenox subcu   Dispo: Medically ready for discharge,  - [facility] pending pre-CERT.  Patient should be able to go there today.                Yaquelin Laird, APRN-CNP

## 2025-01-07 LAB
GLUCOSE BLD MANUAL STRIP-MCNC: 142 MG/DL (ref 74–99)
GLUCOSE BLD MANUAL STRIP-MCNC: 143 MG/DL (ref 74–99)
GLUCOSE BLD MANUAL STRIP-MCNC: 143 MG/DL (ref 74–99)
GLUCOSE BLD MANUAL STRIP-MCNC: 163 MG/DL (ref 74–99)

## 2025-01-07 PROCEDURE — 2500000001 HC RX 250 WO HCPCS SELF ADMINISTERED DRUGS (ALT 637 FOR MEDICARE OP): Performed by: NURSE PRACTITIONER

## 2025-01-07 PROCEDURE — 97112 NEUROMUSCULAR REEDUCATION: CPT | Mod: GO

## 2025-01-07 PROCEDURE — 2500000005 HC RX 250 GENERAL PHARMACY W/O HCPCS: Performed by: NURSE PRACTITIONER

## 2025-01-07 PROCEDURE — 2500000004 HC RX 250 GENERAL PHARMACY W/ HCPCS (ALT 636 FOR OP/ED): Performed by: STUDENT IN AN ORGANIZED HEALTH CARE EDUCATION/TRAINING PROGRAM

## 2025-01-07 PROCEDURE — 1100000001 HC PRIVATE ROOM DAILY

## 2025-01-07 PROCEDURE — 82947 ASSAY GLUCOSE BLOOD QUANT: CPT

## 2025-01-07 PROCEDURE — 2500000004 HC RX 250 GENERAL PHARMACY W/ HCPCS (ALT 636 FOR OP/ED)

## 2025-01-07 PROCEDURE — 97530 THERAPEUTIC ACTIVITIES: CPT | Mod: GP

## 2025-01-07 PROCEDURE — 97116 GAIT TRAINING THERAPY: CPT | Mod: GP

## 2025-01-07 PROCEDURE — 2500000001 HC RX 250 WO HCPCS SELF ADMINISTERED DRUGS (ALT 637 FOR MEDICARE OP)

## 2025-01-07 PROCEDURE — 2500000002 HC RX 250 W HCPCS SELF ADMINISTERED DRUGS (ALT 637 FOR MEDICARE OP, ALT 636 FOR OP/ED): Performed by: NURSE PRACTITIONER

## 2025-01-07 PROCEDURE — 2500000002 HC RX 250 W HCPCS SELF ADMINISTERED DRUGS (ALT 637 FOR MEDICARE OP, ALT 636 FOR OP/ED)

## 2025-01-07 PROCEDURE — 99232 SBSQ HOSP IP/OBS MODERATE 35: CPT | Performed by: NURSE PRACTITIONER

## 2025-01-07 RX ADMIN — FENOFIBRATE 160 MG: 160 TABLET ORAL at 10:22

## 2025-01-07 RX ADMIN — ONDANSETRON 4 MG: 4 TABLET, ORALLY DISINTEGRATING ORAL at 10:31

## 2025-01-07 RX ADMIN — QUETIAPINE FUMARATE 25 MG: 25 TABLET ORAL at 10:18

## 2025-01-07 RX ADMIN — IBUPROFEN 600 MG: 600 TABLET, FILM COATED ORAL at 18:04

## 2025-01-07 RX ADMIN — POTASSIUM CHLORIDE 40 MEQ: 1500 TABLET, EXTENDED RELEASE ORAL at 10:16

## 2025-01-07 RX ADMIN — LOSARTAN POTASSIUM 25 MG: 25 TABLET, FILM COATED ORAL at 10:34

## 2025-01-07 RX ADMIN — HYDROCODONE BITARTRATE AND ACETAMINOPHEN 2 TABLET: 5; 325 TABLET ORAL at 23:27

## 2025-01-07 RX ADMIN — FLUOROMETHOLONE 1 DROP: 1 SOLUTION/ DROPS OPHTHALMIC at 15:45

## 2025-01-07 RX ADMIN — FLUOROMETHOLONE 1 DROP: 1 SOLUTION/ DROPS OPHTHALMIC at 20:06

## 2025-01-07 RX ADMIN — DULOXETINE HYDROCHLORIDE 30 MG: 60 CAPSULE, DELAYED RELEASE ORAL at 10:15

## 2025-01-07 RX ADMIN — IBUPROFEN 600 MG: 600 TABLET, FILM COATED ORAL at 12:04

## 2025-01-07 RX ADMIN — OXYCODONE HYDROCHLORIDE AND ACETAMINOPHEN 1000 MG: 500 TABLET ORAL at 10:18

## 2025-01-07 RX ADMIN — QUETIAPINE FUMARATE 25 MG: 25 TABLET ORAL at 15:45

## 2025-01-07 RX ADMIN — ENOXAPARIN SODIUM 40 MG: 40 INJECTION SUBCUTANEOUS at 17:17

## 2025-01-07 RX ADMIN — METOPROLOL TARTRATE 5 MG: 5 INJECTION INTRAVENOUS at 20:01

## 2025-01-07 RX ADMIN — LEVETIRACETAM 500 MG: 500 TABLET, FILM COATED ORAL at 20:01

## 2025-01-07 RX ADMIN — Medication 2000 UNITS: at 10:18

## 2025-01-07 RX ADMIN — METOPROLOL SUCCINATE 25 MG: 25 TABLET, EXTENDED RELEASE ORAL at 10:35

## 2025-01-07 RX ADMIN — DULOXETINE HYDROCHLORIDE 60 MG: 60 CAPSULE, DELAYED RELEASE ORAL at 10:21

## 2025-01-07 RX ADMIN — QUETIAPINE FUMARATE 25 MG: 25 TABLET ORAL at 20:01

## 2025-01-07 RX ADMIN — FLUOROMETHOLONE 1 DROP: 1 SOLUTION/ DROPS OPHTHALMIC at 10:29

## 2025-01-07 RX ADMIN — ACETAMINOPHEN 975 MG: 325 TABLET ORAL at 10:31

## 2025-01-07 RX ADMIN — ACETAMINOPHEN 975 MG: 325 TABLET ORAL at 20:01

## 2025-01-07 RX ADMIN — LIDOCAINE 2 PATCH: 4 PATCH TOPICAL at 10:19

## 2025-01-07 RX ADMIN — FLUTICASONE PROPIONATE 1 SPRAY: 50 SPRAY, METERED NASAL at 10:29

## 2025-01-07 RX ADMIN — LEVETIRACETAM 500 MG: 500 TABLET, FILM COATED ORAL at 10:30

## 2025-01-07 RX ADMIN — TAMSULOSIN HYDROCHLORIDE 0.4 MG: 0.4 CAPSULE ORAL at 10:34

## 2025-01-07 RX ADMIN — IBUPROFEN 600 MG: 600 TABLET, FILM COATED ORAL at 06:19

## 2025-01-07 RX ADMIN — MODAFINIL 200 MG: 100 TABLET ORAL at 10:30

## 2025-01-07 ASSESSMENT — COGNITIVE AND FUNCTIONAL STATUS - GENERAL
MOBILITY SCORE: 13
HELP NEEDED FOR BATHING: A LOT
TOILETING: A LOT
EATING MEALS: A LITTLE
CLIMB 3 TO 5 STEPS WITH RAILING: TOTAL
DRESSING REGULAR LOWER BODY CLOTHING: A LOT
WALKING IN HOSPITAL ROOM: A LOT
STANDING UP FROM CHAIR USING ARMS: A LOT
DRESSING REGULAR UPPER BODY CLOTHING: A LOT
TURNING FROM BACK TO SIDE WHILE IN FLAT BAD: A LITTLE
MOVING TO AND FROM BED TO CHAIR: A LOT
DAILY ACTIVITIY SCORE: 14
PERSONAL GROOMING: A LITTLE
MOVING FROM LYING ON BACK TO SITTING ON SIDE OF FLAT BED WITH BEDRAILS: A LITTLE

## 2025-01-07 ASSESSMENT — PAIN SCALES - GENERAL
PAINLEVEL_OUTOF10: 5 - MODERATE PAIN
PAINLEVEL_OUTOF10: 6
PAINLEVEL_OUTOF10: 8
PAINLEVEL_OUTOF10: 5 - MODERATE PAIN
PAINLEVEL_OUTOF10: 6
PAINLEVEL_OUTOF10: 4

## 2025-01-07 ASSESSMENT — PAIN - FUNCTIONAL ASSESSMENT
PAIN_FUNCTIONAL_ASSESSMENT: 0-10

## 2025-01-07 ASSESSMENT — PAIN DESCRIPTION - LOCATION: LOCATION: GENERALIZED

## 2025-01-07 NOTE — ASSESSMENT & PLAN NOTE
- Continue PT and OT  - Social work consult to assist with discharge planning; looking at [facility] pending pre-CERT

## 2025-01-07 NOTE — ASSESSMENT & PLAN NOTE
- Continue PT and OT  History of traumatic brain injury/status postcraniotomy/seizure disorder.  - Continue Keppra, Seroquel, Cymbalta  - Because the patient's history of TBI he does have agitation and behaviors.   Patient on Keppra, Provigil, Seroquel, Cymbalta.  - Seizure precautions in place    Chronic pain syndrome  - Tylenol scheduled around-the-clock  - Continue home Norco.  Morphine for severe pain breakthrough pain.    Depression  - Continue Cymbalta and Seroquel    BPH  - tamsulosin 0.4 mg p.o. daily     Essential hypertension  - Losartan 25 mg p.o. daily  - Metoprolol 25 mg twice daily.  Pulse 91 blood pressure 145/93.  Range 112/67 to 168/102.  Pulse 63-98       Hypertryglycerdiemia  - fenofibrate 160mg po daily      Bowel regimen: miralax   Dvt prophylaxis: lovenox subcu   Dispo: Medically ready for discharge, discharge summary has been placed.- [facility] pending pre-CERT.

## 2025-01-07 NOTE — PROGRESS NOTES
Kenneth Valenzuela is a 65 y.o. male on day 9 of admission presenting with Generalized weakness.      Subjective   Patient evaluated in room.  Patient has been discharged awaiting SNF placement.  No adverse events reported overnight       Objective     Last Recorded Vitals  BP (!) 145/93 (BP Location: Left arm, Patient Position: Lying)   Pulse 91   Temp 36.3 °C (97.3 °F) (Temporal)   Resp 16   Wt 82.7 kg (182 lb 5.1 oz)   SpO2 93%   Intake/Output last 3 Shifts:    Intake/Output Summary (Last 24 hours) at 1/7/2025 0940  Last data filed at 1/7/2025 0900  Gross per 24 hour   Intake 840 ml   Output 1025 ml   Net -185 ml       Admission Weight  Weight: 86.2 kg (190 lb) (12/29/24 1118)    Daily Weight  12/29/24 : 82.7 kg (182 lb 5.1 oz)    Image Results  CT pelvis wo IV contrast  Narrative: Interpreted By:  Wilberto Gong,   STUDY:  CT PELVIS WO IV CONTRAST;  12/29/2024 12:11 pm      INDICATION:  Signs/Symptoms:fall.          COMPARISON:  CT chest, abdomen and pelvis with contrast 27 April 2024      ACCESSION NUMBER(S):  [id number]      ORDERING CLINICIAN:  RACHAEL VILLATORO      TECHNIQUE:  CT pelvis from the pelvic inlet through the symphysis pubis without  IV contrast. Coronal and sagittal reformatted images created,  reviewed and saved      FINDINGS:  No acute fracture or hip dislocation or any other acute traumatic  injury evident      Old, healed right inferior pubic ramus fracture      Severe degenerative changes of left hip with subchondral cystic  change on both sides of the joint similar to prior CT      Included lower left kidney redemonstrates portions of two cysts and a  nonobstructing stone. No acute unexpected soft tissue findings  including the field-of-view      Impression: No acute fracture, dislocation, other traumatic injury or any other  acute process otherwise      Old, healed right inferior pubic ramus fracture      MACRO:  None      Signed by: Wilberto Gong 12/29/2024 12:40 PM  Dictation workstation:    [id number]  CT head wo IV contrast, CT cervical spine wo IV contrast  Narrative: Interpreted By:  Wilberto Gong,   STUDY:  CT HEAD WO IV CONTRAST; CT CERVICAL SPINE WO IV CONTRAST;  12/29/2024  12:11 pm      INDICATION:  Signs/Symptoms:fall.          COMPARISON:  CT brain and cervical spine both from 7 April 2024      ACCESSION NUMBER(S):  [id number]; [id number]      ORDERING CLINICIAN:  RACHAEL VILLATORO      TECHNIQUE:  CT of the brain from the skull vertex to the skull base, without  intravenous contrast      CT cervical spine from the craniocervical junction through the  cervicothoracic junction without IV contrast, including sagittal and  coronal reformatted images      FINDINGS:  CT BRAIN      TRAUMA-RELATED      Brain Injury (BIG) guidelines CT values:      Skull fracture: No  SDH (subdural hematoma): None detected  EDH (epidural hematoma): None detected  IPH (intraparenchymal hemorrhage): None detected  SAH (subarachnoid hemorrhage): None detected  IVH (intraventricular hemorrhage): None detected      Reference:  Aba DUTTON, Fawn RS, Nelda M, et al. The BIG (brain injury  guidelines) project: defining the management of traumatic brain  injury by acute care surgeons. J Trauma Acute Care Surg.  2014;76:980i391.      OTHER      ACUTE INTRACRANIAL MASS EFFECT:  Negative      CT EVIDENCE OF ACUTE / SUBACUTE TERRITORIAL ISCHEMIA:  Negative      VENTRICLES:  Unchanged prominence of bilateral lateral without third  or fourth ventricle enlargement or acute obstructive hydrocephalus      OTHER BRAIN FINDINGS:  No significant interval change to the CT  appearance of the brain including the degree of atrophy and deep  white matter changes from chronic microvascular disease and large  area of encephalomalacia in left parietotemporal distribution      INCLUDED PARANASAL SINUSES: All clear      INCLUDED MASTOID AIR CELLS: All clear      SKULL:  No lytic or blastic lesion. Right craniotomy      EXTRACRANIAL SOFT TISSUES:   Scalp and occular globes grossly normal  by CT      -------      CT CERVICAL SPINE      COUNTING REFERENCE: Craniocervical junction      CRANIOCERVICAL JUNCTION:  Intact      CERVICAL ALIGNMENT:  Anatomic; no acute traumatic alignment  abnormality such as subluxation      ACUTE FRACTURE: Negative      AGGRESSIVE OSSEOUS LESION: Negative      BONY CANAL AND FORAMINA:  No significant change from 7 April 2024      PARASPINAL SOFT TISSUES:  No large acute hematoma or other acute  posttraumatic finding      OTHER INCLUDED STRUCTURES:  No acute or contributory soft tissue  abnormality in the other cervical and upper thoracic soft tissues      Impression: NO ACUTE INTRACRANIAL PROCESS. SKULL INTACT      NO ACUTE FRACTURE OR SUBLUXATION IN THE CERVICAL SPINE      THIS REPORT SERVES AS THE DIAGNOSTIC INTERPRETATION FOR TWO EXAMS  PERFORMED CONCURRENTLY: CT BRAIN WITHOUT IV CONTRAST AND CT CERVICAL  SPINE WITHOUT IV CONTRAST      MACRO:  None      Signed by: Wilberto Gong 12/29/2024 12:35 PM  Dictation workstation:   [id number]  XR shoulder left 2+ views  Narrative: Interpreted By:  Geronimo Krishnamurthy,   STUDY:  XR SHOULDER LEFT 2+ VIEWS;  12/29/2024 11:57 am      INDICATION:  Signs/Symptoms:fall.      COMPARISON:  Prior exam from 09/08/2022      ACCESSION NUMBER(S):  [id number]      ORDERING CLINICIAN:  RACHAEL VILLATORO      TECHNIQUE:  3 views  of the  left shoulder were obtained.      FINDINGS:  Mild-to-moderate AC joint hypertrophy with spur formation. Moderate  glenohumeral joint space loss with spur formation. There is a  calcified loose body overlying the scapular neck. No lytic or blastic  destructive bone lesion. No acute fracture or dislocation.  No opaque soft tissue foreign body.  No periosteal reaction or erosion.      Impression: DJD throughout the left shoulder region as described. No acute  fracture or dislocation based on this exam.      MACRO:  None      Signed by: Geronimo Krishnamurthy 12/29/2024 12:09 PM  Dictation  workstation:   [id number]  XR knee left 1-2 views  Narrative: Interpreted By:  Geronimo Krishnamurthy,   STUDY:  XR KNEE LEFT 1-2 VIEWS;  12/29/2024 11:57 am      INDICATION:  Signs/Symptoms:fall.      COMPARISON:  Prior exam from 02/11/2019      ACCESSION NUMBER(S):  [id number]      ORDERING CLINICIAN:  RACHAEL VLILATORO      TECHNIQUE:  AP and lateral views  of the  left knee were obtained.      FINDINGS:  Previous left knee arthroplasty. Increase in soft tissue  calcification in the suprapatellar bursa. There is also fluid in the  bursa. Patellar component is radiolucent. Femoral and tibial  components are well seated and in good alignment. No lytic or blastic  destructive bone lesion. No acute fracture or dislocation.  No opaque soft tissue foreign body.  No periosteal reaction or erosion.      Impression: Intact left knee arthroplasty. No acute fracture or dislocation.      Suprapatellar effusion.      Increase in nonspecific calcification within the suprapatellar bursa.      MACRO:  None      Signed by: Geronimo Krishnamurthy 12/29/2024 12:08 PM  Dictation workstation:   [id number]      Physical Exam  General Appearance: AAO x 2, not in acute distress and no respiratory distress  Skin: skin color, texture, turgor normal; no suspicious rashes or lesions  Eyes : PERRL, EOM's intact, conjunctiva pink  ENT: no oral thrush, no pharyngeal erythema or exudates  Neck: no JVD, no lymphadenopathy  Respiratory: lungs CTA, no rhonchi, wheezing, or crackles  Heart: RRR without murmur, gallop, or rubs, no ectopy  Abdomen: Nondistended, positive bowel sounds, soft,  nontender  Extremities: no edema, ROM x 4 extremities  Peripheral pulses: normal and present x 4 extremities  Neuro: alert, coherent and conversant, no focal motor deficits; does have thickened speech.  Does have cognitive impairment due to his TBI.  Relevant Results     Results for orders placed or performed during the hospital encounter of 12/29/24 (from the past 24 hours)    POCT GLUCOSE   Result Value Ref Range    POCT Glucose 157 (H) 74 - 99 mg/dL   POCT GLUCOSE   Result Value Ref Range    POCT Glucose 219 (H) 74 - 99 mg/dL   POCT GLUCOSE   Result Value Ref Range    POCT Glucose 198 (H) 74 - 99 mg/dL   POCT GLUCOSE   Result Value Ref Range    POCT Glucose 142 (H) 74 - 99 mg/dL     *Note: Due to a large number of results and/or encounters for the requested time period, some results have not been displayed. A complete set of results can be found in Results Review.         Assessment/Plan    64-year-old male admitted for falls and generalized weakness. He has a history of ataxia as well as traumatic brain injury age 16 from a pole vaulting accident, status postcraniotomy for subdural hematoma 2023, behavioral issues, type 2 diabetes, history of kidney stones bilaterally, BPH, chronic pain, type 2 diabetes, hyperlipidemia and seizure disorder.    Assessment & Plan  Falls frequently  - Continue PT and OT  - Social work consult to assist with discharge planning; looking at [facility] pending pre-CERT    Generalized weakness  - Continue PT and OT  History of traumatic brain injury/status postcraniotomy/seizure disorder.  - Continue Keppra, Seroquel, Cymbalta  - Because the patient's history of TBI he does have agitation and behaviors.   Patient on Keppra, Provigil, Seroquel, Cymbalta.  - Seizure precautions in place    Chronic pain syndrome  - Tylenol scheduled around-the-clock  - Continue home Norco.  Morphine for severe pain breakthrough pain.    Depression  - Continue Cymbalta and Seroquel    BPH  - tamsulosin 0.4 mg p.o. daily     Essential hypertension  - Losartan 25 mg p.o. daily  - Metoprolol 25 mg twice daily.  Pulse 91 blood pressure 145/93.  Range 112/67 to 168/102.  Pulse 63-98       Hypertryglycerdiemia  - fenofibrate 160mg po daily      Bowel regimen: miralax   Dvt prophylaxis: lovenox subcu   Dispo: Medically ready for discharge, discharge summary has been placed.- [place]  [place] pending pre-CERT.                  Yaquelin Laird, APRN-CNP

## 2025-01-07 NOTE — CARE PLAN
The patient's goals for the shift include sleep well    The clinical goals for the shift include pain control    Over the shift, the patient slept well throughout the night and pain was controlled with scheduled and PRN pain medication.

## 2025-01-07 NOTE — PROGRESS NOTES
Occupational Therapy    OT Treatment    Patient Name: Kenneth Valenzuela  MRN: [id number]  Department: [facility]  Room: [address] [place] [address]  Today's Date: 1/7/2025  Time Calculation  Start Time: 1041  Stop Time: 1114  Time Calculation (min): 33 min        Assessment:  OT Assessment: pt making gains in postural control and balance during ADL and functional tasks.   Cont with current OT POC and recommend skilled OT services at moderate intensity at discharge  Evaluation/Treatment Tolerance: Patient tolerated treatment well, Patient limited by pain  Medical Staff Made Aware: Yes  End of Session Communication: Bedside nurse  End of Session Patient Position: Up in chair, Alarm on  Evaluation/Treatment Tolerance: Patient tolerated treatment well, Patient limited by pain  Medical Staff Made Aware: Yes  Plan:  Treatment Interventions: ADL retraining, Endurance training, Functional transfer training  OT Frequency: 3 times per week  OT Discharge Recommendations: Moderate intensity level of continued care  OT Recommended Transfer Status: Assist of 1, Assist of 2  OT - OK to Discharge: Yes (when medically stable)  Treatment Interventions: ADL retraining, Endurance training, Functional transfer training    Subjective   Previous Visit Info:  OT Last Visit  OT Received On: 01/07/25  General:  General  Family/Caregiver Present: No  Co-Treatment: PT  Co-Treatment Reason: Maximize pt safety with mobility while addressing discipline-specific goals  Prior to Session Communication: Bedside nurse  Patient Position Received: Bed, 3 rail up, Alarm on  General Comment: pt agreeable to session and anxious to participate  Precautions:  Medical Precautions: Fall precautions, Seizure precautions    Vital Signs (Past 2hrs)           Vital Signs Comment: no concerns     Pain:  Pain Assessment  Pain Assessment: 0-10  0-10 (Numeric) Pain Score: 6 (generalized pain in joints which is chronic)    Objective    Cognition:  Cognition  Overall Cognitive Status:  (no  changes)  Coordination:  Movements are Fluid and Coordinated: No  Activities of Daily Living: LE Dressing  Pants Level of Assistance: Moderate assistance  LE Dressing Where Assessed: Edge of bed  LE Dressing Comments: pt is able to thread pants with good seated balance which is significant improvement compared to yesterdays session  Functional Standing Tolerance:     Bed Mobility/Transfers: Bed Mobility  Bed Mobility: Yes  Bed Mobility 1  Bed Mobility 1: Supine to sitting  Level of Assistance 1: Close supervision  Bed Mobility Comments 1: improvements in technique and efficiency    Transfer 1  Technique 1: Sit to stand, Stand to sit  Transfer Device 1: Walker, Gait belt  Transfer Level of Assistance 1: Minimum assistance  Trials/Comments 1: continued need for cues for safety and technique.  once pt is in a good position he can complete sit to stands with CG/min A.  needs frequent reminders.      Functional Mobility:  Functional Mobility  Functional Mobility Performed: Yes  Functional Mobility 1  Comments 1: see PT note for further details.  OT focus on postural control, sequencing, and safety with mobility.  pt requires cues 40-50% of the time.  frequent cues to decrease impulsivity and attention to task.        Outcome Measures:[facility] Daily Activity  Putting on and taking off regular lower body clothing: A lot  Bathing (including washing, rinsing, drying): A lot  Putting on and taking off regular upper body clothing: A lot  Toileting, which includes using toilet, bedpan or urinal: A lot  Taking care of personal grooming such as brushing teeth: A little  Eating Meals: A little  Daily Activity - Total Score: 14        Education Documentation  Precautions, taught by Jess Ge OT at 1/7/2025 11:54 AM.  Learner: Patient  Readiness: Acceptance  Method: Explanation, Demonstration  Response: Needs Reinforcement, Demonstrated Understanding  Comment: postural control and safety during functional tasks    ADL Training,  taught by Jess Ge OT at 1/7/2025 11:54 AM.  Learner: Patient  Readiness: Acceptance  Method: Explanation, Demonstration  Response: Needs Reinforcement, Demonstrated Understanding  Comment: postural control and safety during functional tasks    Precautions, taught by Jess Ge OT at 1/6/2025 12:19 PM.  Learner: Patient  Readiness: Acceptance  Method: Explanation, Demonstration  Response: Demonstrated Understanding, Needs Reinforcement  Comment: safety and technique for balance during functional tasks    ADL Training, taught by Jess Ge OT at 1/6/2025 12:19 PM.  Learner: Patient  Readiness: Acceptance  Method: Explanation, Demonstration  Response: Demonstrated Understanding, Needs Reinforcement  Comment: safety and technique for balance during functional tasks    Education Comments  No comments found.        OP EDUCATION:       Goals:  Encounter Problems       Encounter Problems (Active)       ADLs       Patient will perform UB bathing  with stand by assist level of assistance. (Progressing)       Start:  12/30/24    Expected End:  01/13/25            Patient with complete lower body dressing with minimal assist  level of assistance  (Progressing)       Start:  12/30/24    Expected End:  01/13/25            Patient will complete toileting including hygiene clothing management/hygiene with minimal assist  level of assistance. (Progressing)       Start:  12/30/24    Expected End:  01/13/25               TRANSFERS       Patient will complete functional transfers with stand by assist level of assistance. (Progressing)       Start:  12/30/24    Expected End:  01/13/25

## 2025-01-07 NOTE — PROGRESS NOTES
Pt reviewed during Care Rounds today. He has been medically ready for discharge for several days; however, we are still awaiting a skilled request/determination from [facility].  Update to Talya and carmen is still pending.  Precert escalated to [facility] and continues to pend.  Care Transitions will continue to follow.       ADDM: MOI reached out to pt's insurance, [employer] , to inquire about the status of pt's precert.  Was told by insurance rep there is no pending authorization on file for SNF. MOI attempted to contact [facility] to discuss- voicemail left.  Also sent a message in "[employer]" to obtain a pending number; awaiting reply. Care Transitions will continue to follow.     SOPHIE Valenzuela

## 2025-01-07 NOTE — PROGRESS NOTES
Physical Therapy    Physical Therapy Treatment    Patient Name: Kenneth Valenzuela  MRN: [id number]  Today's Date: 1/7/2025  Time Calculation  Start Time: 1041  Stop Time: 1115  Time Calculation (min): 34 min     303/303-A    Assessment/Plan   PT Assessment  PT Assessment Results: Decreased strength, Decreased range of motion, Decreased endurance, Impaired balance, Decreased mobility, Impaired judgement, Decreased safety awareness, Pain  Rehab Prognosis: Fair  Barriers to Discharge Home: Cognition needs, Caregiver assistance  Caregiver Assistance: Caregiver assistance needed per identified barriers - however, level of patient's required assistance exceeds assistance available at home  Cognition Needs: 24hr supervision for safety awareness needed  Evaluation/Treatment Tolerance: Patient tolerated treatment well  End of Session Communication: Bedside nurse  Assessment Comment: Pt with good tolerance to ambulation this date. Requires mod Ax2 with chair follow however demos improved management of FWW. Assist for advancing RLE and cues to slow down. Pt will continue to benefit from PT to address mobility deficits.  End of Session Patient Position: Up in chair, Alarm on  PT Plan  Inpatient/Swing Bed or Outpatient: Inpatient  PT Plan  Treatment/Interventions: Bed mobility, Transfer training, Stair training, Gait training, Balance training, Neuromuscular re-education, Strengthening, Endurance training, Range of motion, Therapeutic exercise, Therapeutic activity, Home exercise program  PT Plan: Ongoing PT  PT Frequency: 3 times per week  PT Discharge Recommendations: Moderate intensity level of continued care, 24 hr supervision due to cognition  PT Recommended Transfer Status: Assist x1  PT - OK to Discharge: Yes (PT eval complete, ok to d/c once deemed medically appropriate)    Current Problem:  Patient Active Problem List   Diagnosis    Allergic rhinitis    Ataxia    Benign prostatic hyperplasia with lower urinary tract symptoms     Bilateral renal stones    Cervical arthritis with myelopathy    Chronic pain syndrome    Controlled type 2 diabetes mellitus without complication, without long-term current use of insulin (Multi)    Depression, major, single episode, moderate (Multi)    Falls frequently    Hyperlipidemia    Hypertension    Impingement syndrome of left shoulder    Male erectile disorder    Primary osteoarthritis of both hips    Primary osteoarthritis of left shoulder    Seizure (Multi)    Subdural hematoma (Multi)    Unspecified intracranial injury with loss of consciousness greater than 24 hours with return to pre-existing conscious level, initial encounter (Multi)    Balance disturbance due to old head injury    Behavior-irritability    Cognitive deficit as late effect of traumatic brain injury    Diabetic polyneuropathy associated with type 2 diabetes mellitus (Multi)    Difficulty controlling behavior as late effect traumatic brain injury    Hip pain, left    Hypercalcemia    Migraine without aura and without status migrainosus, not intractable    Median nerve entrapment    Class 1 obesity due to excess calories with serious comorbidity and body mass index (BMI) of 31.0 to 31.9 in adult    Tachycardia    Thoracic back pain    Generalized weakness    Traumatic retroperitoneal hematoma    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    Acquired bilateral renal cysts    Constipation    Frequency of urination    Neurogenic dysfunction of the urinary bladder    Urinary urgency    Combined forms of age-related cataract of right eye       General Visit Information:   PT  Visit  PT Received On: 01/07/25  General  Reason for Referral: impaired mobility  Referred By: [facility] PT/OT eval and treat  Past Medical History Relevant to Rehab: HLD, HTN, depression, DM, TBI (basilar skull fx 1970's in pole vaulting accident), ataxia, subdural hematoma w R craniotomy 2023, seizures, behavioral issues  Family/Caregiver Present:  "No  Co-Treatment: OT  Co-Treatment Reason: Maximize pt safety with mobility while addressing discipline-specific goals  Prior to Session Communication: Bedside nurse  Patient Position Received: Bed, 3 rail up, Alarm on  General Comment: pt agreeable to session and anxious to participate  Subjective     Precautions:  Precautions  Medical Precautions: Fall precautions, Seizure precautions    Vital Signs:  Vital Signs  Vital Signs Comment: no concerns  Objective     Pain:  Pain Assessment  Pain Assessment: 0-10  0-10 (Numeric) Pain Score: 6 (generalized pain in joints which is chronic)  Pain Location:  (\"all over\")    Cognition:  Cognition  Overall Cognitive Status:  (no changes)  Orientation Level: Oriented X4  Following Commands: Follows one step commands without difficulty  Impulsive: Mildly  Processing Speed: Delayed      Extremity/Trunk Assessments:        RLE   RLE :  (R knee severe varus)  LLE   LLE :  (ROM WFL)    Activity Tolerance:  Activity Tolerance  Endurance: Tolerates 10 - 20 min exercise with multiple rests    Treatments:  Therapeutic Exercise  Therapeutic Exercise Performed: No  Therapeutic Exercise Activity 1: standing march x20     Balance/Neuromuscular Re-Education  Balance/Neuromuscular Re-Education Activity Performed: Yes  Balance/Neuromuscular Re-Education Activity 1: standing balance x20 seconds, no UE support  Bed Mobility  Bed Mobility: Yes  Bed Mobility 1  Bed Mobility 1: Supine to sitting  Level of Assistance 1: Close supervision  Bed Mobility Comments 1: Uses bed rails; HOB ~30 degrees, utilizes momentum  Ambulation/Gait Training  Ambulation/Gait Training Performed: Yes  Ambulation/Gait Training 1  Surface 1: Level tile  Device 1: Rolling walker  Gait Support Devices: Gait belt  Assistance 1: Moderate assistance (x2 assist)  Quality of Gait 1: Diminished heel strike, Narrow base of support, Shuffling gait, Inconsistent stride length, Decreased step length (step to pattern with L leg " leading, R leg dragging behind at times. R foot not always advancing to L foot; R foot turned outward and R knee flexed)  Comments/Distance (ft) 1: ambulates ~75' with mod Ax2 and chair follow. Use of additional gait belt around R foot to assist with advancing RLE.  Transfers  Transfer: Yes  Transfer 1  Technique 1: Sit to stand, Stand to sit  Transfer Device 1: Walker, Gait belt  Transfer Level of Assistance 1: Minimum assistance  Trials/Comments 1: continued need for cues for safety and technique. once pt is in a good position he can complete sit to stands with CG/min A. needs frequent reminders. x5 reps.          Outcome Measures:     [facility] Basic Mobility  Turning from your back to your side while in a flat bed without using bedrails: A little  Moving from lying on your back to sitting on the side of a flat bed without using bedrails: A little  Moving to and from bed to chair (including a wheelchair): A lot  Standing up from a chair using your arms (e.g. wheelchair or bedside chair): A lot  To walk in hospital room: A lot  Climbing 3-5 steps with railing: Total  Basic Mobility - Total Score: 13                                      Education Documentation  Mobility Training, taught by Sarah Jensen PT at 1/7/2025 12:04 PM.  Learner: Patient  Readiness: Acceptance  Method: Explanation  Response: Verbalizes Understanding  Comment: FWW management           EDUCATION:     Encounter Problems       Encounter Problems (Active)       PT Problem       Pt will demo stand pivot transfer and sit > stand > sit transfer with FWW and CGA  (Progressing)       Start:  12/30/24    Expected End:  01/13/25            Pt will ambulate 3' with FWW and CGA, without LOB  (Progressing)       Start:  12/30/24    Expected End:  01/13/25            Pt will sit EOB x5 minutes with supervision while participating in therex program to demonstrate improved trunk control (Progressing)       Start:  12/30/24    Expected End:  01/13/25             Pt will demonstrate ability to tolerate 8 minutes of seated or standing therapeutic exercise with 4 or less rest breaks to demonstrate improved activity tolerance.  (Progressing)       Start:  12/30/24    Expected End:  01/13/25               Pain - Adult

## 2025-01-08 VITALS
RESPIRATION RATE: 18 BRPM | BODY MASS INDEX: 29.3 KG/M2 | WEIGHT: 182.32 LBS | DIASTOLIC BLOOD PRESSURE: 78 MMHG | HEIGHT: 66 IN | HEART RATE: 107 BPM | OXYGEN SATURATION: 97 % | SYSTOLIC BLOOD PRESSURE: 127 MMHG | TEMPERATURE: 98.2 F

## 2025-01-08 LAB
ANION GAP SERPL CALC-SCNC: 13 MMOL/L (ref 10–20)
BUN SERPL-MCNC: 21 MG/DL (ref 6–23)
CALCIUM SERPL-MCNC: 10.1 MG/DL (ref 8.6–10.3)
CHLORIDE SERPL-SCNC: 101 MMOL/L (ref 98–107)
CO2 SERPL-SCNC: 24 MMOL/L (ref 21–32)
CREAT SERPL-MCNC: 0.89 MG/DL (ref 0.5–1.3)
EGFRCR SERPLBLD CKD-EPI 2021: >90 ML/MIN/1.73M*2
GLUCOSE BLD MANUAL STRIP-MCNC: 152 MG/DL (ref 74–99)
GLUCOSE SERPL-MCNC: 229 MG/DL (ref 74–99)
POTASSIUM SERPL-SCNC: 4.2 MMOL/L (ref 3.5–5.3)
SODIUM SERPL-SCNC: 134 MMOL/L (ref 136–145)

## 2025-01-08 PROCEDURE — 82947 ASSAY GLUCOSE BLOOD QUANT: CPT

## 2025-01-08 PROCEDURE — 2500000005 HC RX 250 GENERAL PHARMACY W/O HCPCS: Performed by: NURSE PRACTITIONER

## 2025-01-08 PROCEDURE — 80048 BASIC METABOLIC PNL TOTAL CA: CPT | Performed by: HOSPITALIST

## 2025-01-08 PROCEDURE — 99239 HOSP IP/OBS DSCHRG MGMT >30: CPT | Performed by: HOSPITALIST

## 2025-01-08 PROCEDURE — 2500000001 HC RX 250 WO HCPCS SELF ADMINISTERED DRUGS (ALT 637 FOR MEDICARE OP)

## 2025-01-08 PROCEDURE — 36415 COLL VENOUS BLD VENIPUNCTURE: CPT | Performed by: HOSPITALIST

## 2025-01-08 PROCEDURE — 2500000002 HC RX 250 W HCPCS SELF ADMINISTERED DRUGS (ALT 637 FOR MEDICARE OP, ALT 636 FOR OP/ED)

## 2025-01-08 PROCEDURE — 2500000002 HC RX 250 W HCPCS SELF ADMINISTERED DRUGS (ALT 637 FOR MEDICARE OP, ALT 636 FOR OP/ED): Performed by: NURSE PRACTITIONER

## 2025-01-08 RX ORDER — HYDROCODONE BITARTRATE AND ACETAMINOPHEN 10; 325 MG/1; MG/1
1 TABLET ORAL 3 TIMES DAILY PRN
Qty: 5 TABLET | Refills: 0 | Status: SHIPPED | OUTPATIENT
Start: 2025-01-08

## 2025-01-08 RX ORDER — MODAFINIL 200 MG/1
200 TABLET ORAL DAILY
Qty: 7 TABLET | Refills: 0 | Status: SHIPPED | OUTPATIENT
Start: 2025-01-08 | End: 2025-01-15

## 2025-01-08 RX ADMIN — FENOFIBRATE 160 MG: 160 TABLET ORAL at 08:14

## 2025-01-08 RX ADMIN — Medication 2000 UNITS: at 08:14

## 2025-01-08 RX ADMIN — DULOXETINE HYDROCHLORIDE 30 MG: 60 CAPSULE, DELAYED RELEASE ORAL at 08:16

## 2025-01-08 RX ADMIN — LOSARTAN POTASSIUM 25 MG: 25 TABLET, FILM COATED ORAL at 08:14

## 2025-01-08 RX ADMIN — FLUTICASONE PROPIONATE 1 SPRAY: 50 SPRAY, METERED NASAL at 08:16

## 2025-01-08 RX ADMIN — POTASSIUM CHLORIDE 40 MEQ: 1500 TABLET, EXTENDED RELEASE ORAL at 11:11

## 2025-01-08 RX ADMIN — TAMSULOSIN HYDROCHLORIDE 0.4 MG: 0.4 CAPSULE ORAL at 08:14

## 2025-01-08 RX ADMIN — FLUOROMETHOLONE 1 DROP: 1 SOLUTION/ DROPS OPHTHALMIC at 08:16

## 2025-01-08 RX ADMIN — DULOXETINE HYDROCHLORIDE 60 MG: 60 CAPSULE, DELAYED RELEASE ORAL at 08:14

## 2025-01-08 RX ADMIN — QUETIAPINE FUMARATE 25 MG: 25 TABLET ORAL at 08:24

## 2025-01-08 RX ADMIN — MODAFINIL 200 MG: 100 TABLET ORAL at 08:14

## 2025-01-08 RX ADMIN — LEVETIRACETAM 500 MG: 500 TABLET, FILM COATED ORAL at 08:14

## 2025-01-08 RX ADMIN — METOPROLOL SUCCINATE 25 MG: 25 TABLET, EXTENDED RELEASE ORAL at 08:14

## 2025-01-08 RX ADMIN — LIDOCAINE 2 PATCH: 4 PATCH TOPICAL at 08:15

## 2025-01-08 RX ADMIN — HYDROCODONE BITARTRATE AND ACETAMINOPHEN 2 TABLET: 5; 325 TABLET ORAL at 07:21

## 2025-01-08 RX ADMIN — OXYCODONE HYDROCHLORIDE AND ACETAMINOPHEN 1000 MG: 500 TABLET ORAL at 08:17

## 2025-01-08 ASSESSMENT — COGNITIVE AND FUNCTIONAL STATUS - GENERAL
PERSONAL GROOMING: A LITTLE
TOILETING: A LITTLE
STANDING UP FROM CHAIR USING ARMS: A LOT
HELP NEEDED FOR BATHING: A LOT
MOVING TO AND FROM BED TO CHAIR: A LOT
DRESSING REGULAR UPPER BODY CLOTHING: A LITTLE
TURNING FROM BACK TO SIDE WHILE IN FLAT BAD: A LOT
CLIMB 3 TO 5 STEPS WITH RAILING: A LOT
DRESSING REGULAR LOWER BODY CLOTHING: A LITTLE
DAILY ACTIVITIY SCORE: 18
MOBILITY SCORE: 14
WALKING IN HOSPITAL ROOM: A LOT

## 2025-01-08 ASSESSMENT — PAIN - FUNCTIONAL ASSESSMENT
PAIN_FUNCTIONAL_ASSESSMENT: 0-10

## 2025-01-08 ASSESSMENT — PAIN SCALES - GENERAL
PAINLEVEL_OUTOF10: 5 - MODERATE PAIN
PAINLEVEL_OUTOF10: 4
PAINLEVEL_OUTOF10: 10 - WORST POSSIBLE PAIN

## 2025-01-08 ASSESSMENT — PAIN DESCRIPTION - LOCATION: LOCATION: HEAD

## 2025-01-08 NOTE — CARE PLAN
The patient's goals for the shift include gain strength    The clinical goals for the shift include Pt will walk 50 ft with therapy and not be sob.      Problem: Skin  Goal: Decreased wound size/increased tissue granulation at next dressing change  Outcome: Progressing  Goal: Participates in plan/prevention/treatment measures  Outcome: Progressing  Goal: Prevent/manage excess moisture  Outcome: Progressing  Goal: Prevent/minimize sheer/friction injuries  Outcome: Progressing  Goal: Promote/optimize nutrition  Outcome: Progressing  Goal: Promote skin healing  Outcome: Progressing     Problem: Diabetes  Goal: Achieve decreasing blood glucose levels by end of shift  Outcome: Progressing  Goal: Increase stability of blood glucose readings by end of shift  Outcome: Progressing  Goal: Maintain electrolyte levels within acceptable range throughout shift  Outcome: Progressing  Goal: Maintain glucose levels >70mg/dl to <250mg/dl throughout shift  Outcome: Progressing  Goal: No changes in neurological exam by end of shift  Outcome: Progressing  Goal: Learn about and adhere to nutrition recommendations by end of shift  Outcome: Progressing  Goal: Vital signs within normal range for age by end of shift  Outcome: Progressing  Goal: Increase self care and/or family involovement by end of shift  Outcome: Progressing     Problem: Pain - Adult  Goal: Verbalizes/displays adequate comfort level or baseline comfort level  Outcome: Progressing     Problem: Safety - Adult  Goal: Free from fall injury  Outcome: Progressing     Problem: Discharge Planning  Goal: Discharge to home or other facility with appropriate resources  Outcome: Progressing     Problem: Fall/Injury  Goal: Not fall by end of shift  Outcome: Progressing  Goal: Be free from injury by end of the shift  Outcome: Progressing  Goal: Use assistive devices by end of the shift  Outcome: Progressing  Goal: Pace activities to prevent fatigue by end of the shift  Outcome:  Progressing     Problem: Fall/Injury  Goal: Not fall by end of shift  Outcome: Progressing  Goal: Be free from injury by end of the shift  Outcome: Progressing  Goal: Use assistive devices by end of the shift  Outcome: Progressing  Goal: Pace activities to prevent fatigue by end of the shift  Outcome: Progressing

## 2025-01-08 NOTE — NURSING NOTE
Discharge Note: 1/8/2025 1129 Discharged via stretcher by [facility] to SNF, paperwork packet sent with transporter, personal belongings taken by transporter, no distress noted, no complaints voiced. Kenn MCDANIEL

## 2025-01-08 NOTE — PROGRESS NOTES
Update from [facility] that auth has been received and they are prepared to accept today. SW met with pt to review his plan and IM. Pt confirms his desires to discharge to [facility]. IM reviewed without questions/concerns- copy added to pt's chart, additional copy provided to pt.  Call placed to pt's wife/Liseth with an update as well. Final orders attached in [facility] for [facility], the hospital exemption completed by JORGE LUIS/[place], and transportation arranged for 11am via nursing. No further needs identified.       SOPHIE Valenzuela

## 2025-01-08 NOTE — CARE PLAN
The clinical goals for the shift include pain control    Over the shift, the patient was able to control pain with scheduled and PRN pain medication.  Pt rested well throughout the night

## 2025-01-08 NOTE — DISCHARGE SUMMARY
DISCHARGE DIAGNOSIS     Frequent falls  History of traumatic brain injury with history of seizure disorder  Chronic pain syndrome  Depression  BPH  Hypertension  Hypertriglyceridemia    HOSPITAL COURSE AND DETAILS     Mr. Valenzuela is a  64 y.o. male with past medical history of ataxia, BPH, bilateral renal stones, chronic pain syndrome, type 2 diabetes, depression, frequent falls, hyperlipidemia, subdural hematoma with right craniotomy for evacuation of subdural hematoma in 2023, hypertension,  behavioral issues, traumatic brain injury at the age of 16 from a pole vaulting accident, and seizures presented to [facility] ED with complaints of multiple falls at home.  Labs done in ER grossly within normal limits.  UA negative for UTI.  CT head and C-spine show no acute fracture or subluxation.  CT pelvis without IV contrast negative for fracture or dislocation.  X-ray to the left knee shows intact arthroplasty without fracture or dislocation.  X-ray to left shoulder shows DJD without acute fracture.  Patient received oxycodone and Ativan in the emergency room.  Patient will be admitted to medical surgical floor with working diagnosis of frequent falls and need for placement.     He worked with physical therapy who recommended a skilled nursing facility.  Patient was hemodynamically stable on the day of discharge, will be discharged to SNF for rehab.  He had other comorbid conditions including BPH, depression, chronic pain syndrome which were all stable during his hospitalization.      Total time spent on discharge planning and coordination of care 40 minutes.    * Some of these notes were completed using Dragon voice recognition technology and may include unintended areas with respect to translation of word, typographical errors or primary areas which may not have been identified prior to finalization of the document.        DISCHARGE PHYSICAL EXAM     Last Recorded Vitals:  Vitals:    01/07/25 2002 01/07/25 2100  01/07/25 2119 01/08/25 0747   BP: (!) 140/102  152/89 (!) 160/103   BP Location:    Left arm   Patient Position:    Lying   Pulse:  106  99   Resp:  18  18   Temp:  36.5 °C (97.7 °F)  36.7 °C (98.1 °F)   TempSrc:  Temporal  Temporal   SpO2:  95%  97%   Weight:       Height:           Physical Exam  PHYSICAL EXAM:   GENERAL: Laying in bed, does not appear to be in any distress.   HEENT: HEAD: Normocephalic atraumatic.  Neck: Supple.  Eyes: Pupils are reactive to direct light.   CVS: S1, S2 heard. Regular rate and rhythm  LUNGS: Clear to auscultate bilaterally. No wheezing or rhonchi appreciated.  ABDOMEN: Soft, nontender to palpate. Positive bowel sounds. No guarding or rebound appreciated.  NEUROLOGICAL: No focal neurological deficits appreciated. Cranial nerves are grossly intact.  EXTREMITIES: Dorsalis pedis pulses can be appreciated. No edema appreciated.  SKIN:  Grossly intact, warm and dry.    DISCHARGE MEDICATIONS        Your medication list        START taking these medications        Instructions Last Dose Given Next Dose Due   lidocaine 4 % patch      Place 2 patches over 12 hours on the skin once daily. Remove & discard patch within 12 hours or as directed by MD.              CONTINUE taking these medications        Instructions Last Dose Given Next Dose Due   acetaminophen 500 mg tablet  Commonly known as: Tylenol           albuterol 90 mcg/actuation inhaler  Commonly known as: Ventolin HFA      Inhale 2 puffs every 6 hours if needed for wheezing or shortness of breath.       apple cider vinegar 600 mg capsule           ascorbic acid 1,000 mg tablet  Commonly known as: Vitamin C           cholecalciferol 50 mcg (2,000 unit) capsule  Commonly known as: Vitamin D-3           choline fenofibrate 135 mg DR capsule  Commonly known as: Trilipix      Take 1 capsule (135 mg) by mouth once daily.       DULoxetine 30 mg DR capsule  Commonly known as: Cymbalta      Take 1 capsule (30 mg) by mouth once daily.        DULoxetine 60 mg DR capsule  Commonly known as: Cymbalta      Take 1 capsule (60 mg) by mouth once daily.       etodolac 400 mg tablet  Commonly known as: Lodine           fluorometholone 0.1 % ophthalmic suspension  Commonly known as: FML           fluticasone 50 mcg/actuation nasal spray  Commonly known as: Flonase      Administer 1 spray into each nostril once daily. PRN       HYDROcodone-acetaminophen  mg tablet  Commonly known as: Norco      Take 1 tablet by mouth 3 times a day as needed for severe pain (7 - 10).       irbesartan 75 mg tablet  Commonly known as: Avapro      Take 1 tablet (75 mg) by mouth once daily.       levETIRAcetam 500 mg tablet  Commonly known as: Keppra      Take 1 tablet (500 mg) by mouth 2 times a day.       metoprolol succinate XL 25 mg 24 hr tablet  Commonly known as: Toprol-XL      Take 1 tablet (25 mg) by mouth once daily.       milk thistle 175 mg tablet           modafinil 200 mg tablet  Commonly known as: Provigil           naloxone 4 mg/0.1 mL nasal spray  Commonly known as: Narcan           polyethylene glycol 17 gram/dose powder  Commonly known as: Glycolax, Miralax           potassium chloride CR 10 mEq ER tablet  Commonly known as: Klor-Con      Take 1 tablet (10 mEq) by mouth once daily. Do not crush, chew, or split.       QUEtiapine 25 mg tablet  Commonly known as: SEROquel      Take 1 tablet (25 mg) by mouth 3 times a day.       SF 5000 Plus 1.1 % dental cream  Generic drug: fluoride (sodium)           tamsulosin 0.4 mg 24 hr capsule  Commonly known as: Flomax                  STOP taking these medications      meloxicam 15 mg tablet  Commonly known as: Mobic        neomycin-polymyxin-HC otic solution  Commonly known as: Cortisporin                  Where to Get Your Medications        Information about where to get these medications is not yet available    Ask your nurse or doctor about these medications  lidocaine 4 % patch           OUTPATIENT FOLLOW-UP      Future Appointments   Date Time Provider Department Center   4/4/2025  8:40 AM Caden Ladd MD [id number]

## 2025-01-09 NOTE — DOCUMENTATION CLARIFICATION NOTE
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    Question: Please further clarify the most likely etiology of Weakness on admission after final work up    When answering this query, please exercise your independent professional judgment. The fact that a question is being asked, does not imply that any particular answer is desired or expected.    The patient's clinical indicators include:  Clinical Information:  *64 year old male to ED with frequent falls and weakness    Clinical Indicators:  * 24- H&P documents \"Generalized weakness\", \"History of traumatic brain injury\"  * 1/3/25 Discharge summary documented discharge diagnosis of \"Generalized weakness\"- Discharge canceled pending placement    Treatment:  * PT- OT- social work consult to place  * Continue Keppra, Seroquel, Cymbalta and start Zyprexa for anger issues    Risk Factors:  * TBI history with anger issues and behaviors, Seizures, Chronic pain syndrome, frequent falls and DM  Options provided:  -- 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

## 2025-01-20 ENCOUNTER — TELEPHONE (OUTPATIENT)
Dept: PRIMARY CARE | Facility: CLINIC | Age: 65
End: 2025-01-20
Payer: COMMERCIAL

## 2025-01-20 NOTE — TELEPHONE ENCOUNTER
Message from Jess at [facility] re: Rx for Hydrocodone-Acet was written by Dr. Burleson on 12/20/24.  Patient just now requesting Rx and since it's over 14 days, new Rx is needed.  Patient will be seeing Dr. Wilberto Shukla

## 2025-01-21 DIAGNOSIS — M47.12 CERVICAL ARTHRITIS WITH MYELOPATHY: ICD-10-CM

## 2025-01-21 RX ORDER — HYDROCODONE BITARTRATE AND ACETAMINOPHEN 10; 325 MG/1; MG/1
1 TABLET ORAL 3 TIMES DAILY PRN
Qty: 90 TABLET | Refills: 0 | Status: SHIPPED | OUTPATIENT
Start: 2025-01-21

## 2025-01-21 RX ORDER — HYDROCODONE BITARTRATE AND ACETAMINOPHEN 10; 325 MG/1; MG/1
1 TABLET ORAL 3 TIMES DAILY PRN
Qty: 5 TABLET | Refills: 0 | Status: SHIPPED | OUTPATIENT
Start: 2025-01-21 | End: 2025-01-21 | Stop reason: SDUPTHER

## 2025-03-03 ENCOUNTER — TELEPHONE (OUTPATIENT)
Dept: PRIMARY CARE | Facility: CLINIC | Age: 65
End: 2025-03-03
Payer: COMMERCIAL

## 2025-03-03 NOTE — TELEPHONE ENCOUNTER
Kaylee from [facility] called to cancel his OV in April with Dr. Ladd. Patient is in a LTC facility and will be seen by that physician until he is discharged.

## 2025-04-04 ENCOUNTER — APPOINTMENT (OUTPATIENT)
Dept: PRIMARY CARE | Facility: CLINIC | Age: 65
End: 2025-04-04
Payer: COMMERCIAL